# Patient Record
Sex: FEMALE | NOT HISPANIC OR LATINO | Employment: OTHER | URBAN - METROPOLITAN AREA
[De-identification: names, ages, dates, MRNs, and addresses within clinical notes are randomized per-mention and may not be internally consistent; named-entity substitution may affect disease eponyms.]

---

## 2019-01-08 ENCOUNTER — HOSPITAL ENCOUNTER (EMERGENCY)
Facility: HOSPITAL | Age: 59
Discharge: HOME | End: 2019-01-09
Attending: EMERGENCY MEDICINE | Admitting: EMERGENCY MEDICINE

## 2019-01-08 ENCOUNTER — APPOINTMENT (OUTPATIENT)
Dept: CT IMAGING | Facility: HOSPITAL | Age: 59
End: 2019-01-08

## 2019-01-08 DIAGNOSIS — E87.6 HYPOKALEMIA: ICD-10-CM

## 2019-01-08 DIAGNOSIS — F41.9 ANXIETY: ICD-10-CM

## 2019-01-08 DIAGNOSIS — R53.1 GENERALIZED WEAKNESS: Primary | ICD-10-CM

## 2019-01-08 LAB
BASOPHILS # BLD AUTO: 0.02 10*3/MM3 (ref 0–0.2)
BASOPHILS NFR BLD AUTO: 0.1 % (ref 0–1.5)
D-LACTATE SERPL-SCNC: 2 MMOL/L (ref 0.5–2)
DEPRECATED RDW RBC AUTO: 51.2 FL (ref 37–54)
EOSINOPHIL # BLD AUTO: 0.1 10*3/MM3 (ref 0–0.7)
EOSINOPHIL NFR BLD AUTO: 0.7 % (ref 0.3–6.2)
ERYTHROCYTE [DISTWIDTH] IN BLOOD BY AUTOMATED COUNT: 16 % (ref 11.7–13)
HCT VFR BLD AUTO: 38.9 % (ref 35.6–45.5)
HGB BLD-MCNC: 13.2 G/DL (ref 11.9–15.5)
IMM GRANULOCYTES # BLD AUTO: 0.06 10*3/MM3 (ref 0–0.03)
IMM GRANULOCYTES NFR BLD AUTO: 0.4 % (ref 0–0.5)
LYMPHOCYTES # BLD AUTO: 3.11 10*3/MM3 (ref 0.9–4.8)
LYMPHOCYTES NFR BLD AUTO: 22.9 % (ref 19.6–45.3)
MCH RBC QN AUTO: 29.6 PG (ref 26.9–32)
MCHC RBC AUTO-ENTMCNC: 33.9 G/DL (ref 32.4–36.3)
MCV RBC AUTO: 87.2 FL (ref 80.5–98.2)
MONOCYTES # BLD AUTO: 0.83 10*3/MM3 (ref 0.2–1.2)
MONOCYTES NFR BLD AUTO: 6.1 % (ref 5–12)
NEUTROPHILS # BLD AUTO: 9.44 10*3/MM3 (ref 1.9–8.1)
NEUTROPHILS NFR BLD AUTO: 69.8 % (ref 42.7–76)
PLATELET # BLD AUTO: 295 10*3/MM3 (ref 140–500)
PMV BLD AUTO: 10.1 FL (ref 6–12)
RBC # BLD AUTO: 4.46 10*6/MM3 (ref 3.9–5.2)
WBC NRBC COR # BLD: 13.56 10*3/MM3 (ref 4.5–10.7)

## 2019-01-08 PROCEDURE — 80053 COMPREHEN METABOLIC PANEL: CPT | Performed by: EMERGENCY MEDICINE

## 2019-01-08 PROCEDURE — 85610 PROTHROMBIN TIME: CPT | Performed by: EMERGENCY MEDICINE

## 2019-01-08 PROCEDURE — 85730 THROMBOPLASTIN TIME PARTIAL: CPT | Performed by: EMERGENCY MEDICINE

## 2019-01-08 PROCEDURE — 99284 EMERGENCY DEPT VISIT MOD MDM: CPT

## 2019-01-08 PROCEDURE — 84484 ASSAY OF TROPONIN QUANT: CPT | Performed by: EMERGENCY MEDICINE

## 2019-01-08 PROCEDURE — 83605 ASSAY OF LACTIC ACID: CPT | Performed by: EMERGENCY MEDICINE

## 2019-01-08 PROCEDURE — 96361 HYDRATE IV INFUSION ADD-ON: CPT

## 2019-01-08 PROCEDURE — 25010000002 LORAZEPAM PER 2 MG: Performed by: EMERGENCY MEDICINE

## 2019-01-08 PROCEDURE — 96374 THER/PROPH/DIAG INJ IV PUSH: CPT

## 2019-01-08 PROCEDURE — 96375 TX/PRO/DX INJ NEW DRUG ADDON: CPT

## 2019-01-08 PROCEDURE — 25010000002 ONDANSETRON PER 1 MG: Performed by: EMERGENCY MEDICINE

## 2019-01-08 PROCEDURE — 85025 COMPLETE CBC W/AUTO DIFF WBC: CPT | Performed by: EMERGENCY MEDICINE

## 2019-01-08 RX ORDER — SODIUM CHLORIDE 0.9 % (FLUSH) 0.9 %
10 SYRINGE (ML) INJECTION AS NEEDED
Status: DISCONTINUED | OUTPATIENT
Start: 2019-01-08 | End: 2019-01-09 | Stop reason: HOSPADM

## 2019-01-08 RX ORDER — ONDANSETRON 2 MG/ML
4 INJECTION INTRAMUSCULAR; INTRAVENOUS ONCE
Status: COMPLETED | OUTPATIENT
Start: 2019-01-08 | End: 2019-01-08

## 2019-01-08 RX ORDER — LORAZEPAM 2 MG/ML
0.5 INJECTION INTRAMUSCULAR ONCE
Status: COMPLETED | OUTPATIENT
Start: 2019-01-08 | End: 2019-01-08

## 2019-01-08 RX ADMIN — LORAZEPAM 0.5 MG: 2 INJECTION, SOLUTION INTRAMUSCULAR; INTRAVENOUS at 23:35

## 2019-01-08 RX ADMIN — SODIUM CHLORIDE 1000 ML: 9 INJECTION, SOLUTION INTRAVENOUS at 23:34

## 2019-01-08 RX ADMIN — ONDANSETRON 4 MG: 2 INJECTION INTRAMUSCULAR; INTRAVENOUS at 23:35

## 2019-01-09 ENCOUNTER — APPOINTMENT (OUTPATIENT)
Dept: CT IMAGING | Facility: HOSPITAL | Age: 59
End: 2019-01-09

## 2019-01-09 VITALS
HEIGHT: 69 IN | SYSTOLIC BLOOD PRESSURE: 128 MMHG | WEIGHT: 148.7 LBS | RESPIRATION RATE: 16 BRPM | TEMPERATURE: 98.5 F | OXYGEN SATURATION: 97 % | BODY MASS INDEX: 22.02 KG/M2 | DIASTOLIC BLOOD PRESSURE: 88 MMHG | HEART RATE: 112 BPM

## 2019-01-09 LAB
ALBUMIN SERPL-MCNC: 4.1 G/DL (ref 3.5–5.2)
ALBUMIN/GLOB SERPL: 1.2 G/DL
ALP SERPL-CCNC: 82 U/L (ref 39–117)
ALT SERPL W P-5'-P-CCNC: 21 U/L (ref 1–33)
ANION GAP SERPL CALCULATED.3IONS-SCNC: 17.8 MMOL/L
APTT PPP: 26.9 SECONDS (ref 22.7–35.4)
AST SERPL-CCNC: 31 U/L (ref 1–32)
BACTERIA UR QL AUTO: ABNORMAL /HPF
BILIRUB SERPL-MCNC: 0.6 MG/DL (ref 0.1–1.2)
BILIRUB UR QL STRIP: NEGATIVE
BUN BLD-MCNC: 7 MG/DL (ref 8–23)
BUN/CREAT SERPL: 9.7 (ref 7–25)
CALCIUM SPEC-SCNC: 9.5 MG/DL (ref 8.6–10.5)
CHLORIDE SERPL-SCNC: 95 MMOL/L (ref 98–107)
CLARITY UR: CLEAR
CO2 SERPL-SCNC: 21.2 MMOL/L (ref 22–29)
COLOR UR: YELLOW
CREAT BLD-MCNC: 0.72 MG/DL (ref 0.57–1)
GFR SERPL CREATININE-BSD FRML MDRD: 82 ML/MIN/1.73
GLOBULIN UR ELPH-MCNC: 3.5 GM/DL
GLUCOSE BLD-MCNC: 87 MG/DL (ref 65–99)
GLUCOSE UR STRIP-MCNC: NEGATIVE MG/DL
HGB UR QL STRIP.AUTO: ABNORMAL
HYALINE CASTS UR QL AUTO: ABNORMAL /LPF
INR PPP: 1.07 (ref 0.9–1.1)
KETONES UR QL STRIP: NEGATIVE
LEUKOCYTE ESTERASE UR QL STRIP.AUTO: NEGATIVE
NITRITE UR QL STRIP: NEGATIVE
PH UR STRIP.AUTO: 6 [PH] (ref 5–8)
POTASSIUM BLD-SCNC: 3.1 MMOL/L (ref 3.5–5.2)
PROT SERPL-MCNC: 7.6 G/DL (ref 6–8.5)
PROT UR QL STRIP: NEGATIVE
PROTHROMBIN TIME: 13.7 SECONDS (ref 11.7–14.2)
RBC # UR: ABNORMAL /HPF
REF LAB TEST METHOD: ABNORMAL
SODIUM BLD-SCNC: 134 MMOL/L (ref 136–145)
SP GR UR STRIP: 1.01 (ref 1–1.03)
SQUAMOUS #/AREA URNS HPF: ABNORMAL /HPF
TROPONIN T SERPL-MCNC: <0.01 NG/ML (ref 0–0.03)
UROBILINOGEN UR QL STRIP: ABNORMAL
WBC UR QL AUTO: ABNORMAL /HPF

## 2019-01-09 PROCEDURE — 81001 URINALYSIS AUTO W/SCOPE: CPT | Performed by: EMERGENCY MEDICINE

## 2019-01-09 PROCEDURE — 70450 CT HEAD/BRAIN W/O DYE: CPT

## 2019-01-09 RX ORDER — LORAZEPAM 1 MG/1
1 TABLET ORAL 2 TIMES DAILY PRN
Qty: 8 TABLET | Refills: 0 | Status: SHIPPED | OUTPATIENT
Start: 2019-01-09 | End: 2019-08-05

## 2019-01-09 RX ORDER — POTASSIUM CHLORIDE 750 MG/1
40 CAPSULE, EXTENDED RELEASE ORAL ONCE
Status: COMPLETED | OUTPATIENT
Start: 2019-01-09 | End: 2019-01-09

## 2019-01-09 RX ORDER — LORAZEPAM 1 MG/1
1 TABLET ORAL ONCE
Status: COMPLETED | OUTPATIENT
Start: 2019-01-09 | End: 2019-01-09

## 2019-01-09 RX ADMIN — POTASSIUM CHLORIDE 40 MEQ: 750 CAPSULE, EXTENDED RELEASE ORAL at 02:06

## 2019-01-09 RX ADMIN — LORAZEPAM 1 MG: 1 TABLET ORAL at 01:52

## 2019-01-09 NOTE — ED NOTES
"Pt states \"my  said last week I had an episode where I was totallly out of it.\" pt also reports stopped taking all medications x  2 months ago. +fatigue, +no appetite x 5 days, + 2 seizures today, +nausea no vomiting     Angi Celaya, RN  01/08/19 7762    Pt also c/o headache and generalized back pain.     Angi Celaya RN  01/08/19 6625    "

## 2019-01-09 NOTE — ED NOTES
unable to call cab, this RN called cab for pt. Yellow cab should be here within 15 mins.      Angi Celaya, CRUZ  01/09/19 0212

## 2019-01-09 NOTE — ED NOTES
Pt reports body pain and weakness that started x 1 week with worsening symptoms. Pt also reports SZ x 2 today. Pt alert and oriented.     Pt hx sarcoidsis, SZ.     Destiny Rojas, RN  01/08/19 2859

## 2019-01-09 NOTE — ED PROVIDER NOTES
EMERGENCY DEPARTMENT ENCOUNTER    CHIEF COMPLAINT  Chief Complaint: Generalized weakness  History given by: patient   History limited by: n/a  Room Number: EDWR/WR  PMD: Provider, No Known      HPI:  Pt is a 59 y.o. female who presents complaining of generalized weakness that has been ongoing for the past week. Pt states that she has been unable to ambulate for the past week due to the weakness. She also complains of nausea and vomiting, but denies diarrhea, abd pain, or any other sx. Pt states that all of her medications were discontinued 2 months ago.     Duration:  1 week   Onset: gradual  Timing: constant   Location: generalized   Radiation: none  Quality: weakness   Intensity/Severity: moderate  Progression: unchanged   Associated Symptoms: nausea, vomiting   Aggravating Factors: none  Alleviating Factors: none    PAST MEDICAL HISTORY  Active Ambulatory Problems     Diagnosis Date Noted   • No Active Ambulatory Problems     Resolved Ambulatory Problems     Diagnosis Date Noted   • No Resolved Ambulatory Problems     No Additional Past Medical History       PAST SURGICAL HISTORY  No past surgical history on file.    FAMILY HISTORY  No family history on file.    SOCIAL HISTORY  Social History     Socioeconomic History   • Marital status:      Spouse name: Not on file   • Number of children: Not on file   • Years of education: Not on file   • Highest education level: Not on file   Social Needs   • Financial resource strain: Not on file   • Food insecurity - worry: Not on file   • Food insecurity - inability: Not on file   • Transportation needs - medical: Not on file   • Transportation needs - non-medical: Not on file   Occupational History   • Not on file   Tobacco Use   • Smoking status: Not on file   Substance and Sexual Activity   • Alcohol use: Not on file   • Drug use: Not on file   • Sexual activity: Defer   Other Topics Concern   • Not on file   Social History Narrative   • Not on file        ALLERGIES  Augmentin [amoxicillin-pot clavulanate]; Cephalosporins; and Zyprexa [olanzapine]    REVIEW OF SYSTEMS  Review of Systems   Constitutional: Negative for fever.   HENT: Negative for sore throat.    Eyes: Negative.    Respiratory: Negative for cough and shortness of breath.    Cardiovascular: Negative for chest pain.   Gastrointestinal: Positive for nausea and vomiting. Negative for abdominal pain and diarrhea.   Genitourinary: Negative for dysuria.   Musculoskeletal: Negative for neck pain.   Skin: Negative for rash.   Allergic/Immunologic: Negative.    Neurological: Positive for weakness (generalized). Negative for numbness and headaches.   Hematological: Negative.    Psychiatric/Behavioral: Negative.    All other systems reviewed and are negative.      PHYSICAL EXAM  ED Triage Vitals [01/08/19 2200]   Temp Heart Rate Resp BP SpO2   98.5 °F (36.9 °C) 112 18 117/79 98 %      Temp src Heart Rate Source Patient Position BP Location FiO2 (%)   Tympanic -- -- -- --       Physical Exam   Constitutional: She is oriented to person, place, and time. No distress.   HENT:   Head: Normocephalic and atraumatic.   Eyes: EOM are normal. Pupils are equal, round, and reactive to light.   Neck: Normal range of motion. Neck supple.   Cardiovascular: Regular rhythm and normal heart sounds. Tachycardia present.   Pulmonary/Chest: Effort normal and breath sounds normal. No respiratory distress.   Abdominal: Soft. There is no tenderness. There is no rebound and no guarding.   Musculoskeletal: Normal range of motion. She exhibits no edema.   Neurological: She is alert and oriented to person, place, and time. She has normal sensation and normal strength.   Skin: Skin is warm and dry. No rash noted.   Psychiatric: Mood and affect normal.   Nursing note and vitals reviewed.      LAB RESULTS  Lab Results (last 24 hours)     Procedure Component Value Units Date/Time    CBC & Differential [[id number]] Collected:  01/08/19 9342     Specimen:  Blood Updated:  01/08/19 2341    Narrative:       The following orders were created for panel order CBC & Differential.  Procedure                               Abnormality         Status                     ---------                               -----------         ------                     CBC Auto Differential[[id number]]        Abnormal            Final result                 Please view results for these tests on the individual orders.    Comprehensive Metabolic Panel [[id number]]  (Abnormal) Collected:  01/08/19 2327    Specimen:  Blood Updated:  01/09/19 0002     Glucose 87 mg/dL      BUN 7 mg/dL      Creatinine 0.72 mg/dL      Sodium 134 mmol/L      Potassium 3.1 mmol/L      Chloride 95 mmol/L      CO2 21.2 mmol/L      Calcium 9.5 mg/dL      Total Protein 7.6 g/dL      Albumin 4.10 g/dL      ALT (SGPT) 21 U/L      AST (SGOT) 31 U/L      Alkaline Phosphatase 82 U/L      Total Bilirubin 0.6 mg/dL      eGFR Non African Amer 82 mL/min/1.73      Globulin 3.5 gm/dL      A/G Ratio 1.2 g/dL      BUN/Creatinine Ratio 9.7     Anion Gap 17.8 mmol/L     Troponin [[id number]]  (Normal) Collected:  01/08/19 2327    Specimen:  Blood Updated:  01/09/19 0002     Troponin T <0.010 ng/mL     Narrative:       Troponin T Reference Ranges:  Less than 0.03 ng/mL:    Negative for AMI  0.03 to 0.09 ng/mL:      Indeterminant for AMI  Greater than 0.09 ng/mL: Positive for AMI    Lactic Acid, Plasma [[id number]]  (Normal) Collected:  01/08/19 2327    Specimen:  Blood Updated:  01/08/19 2359     Lactate 2.0 mmol/L     CBC Auto Differential [[id number]]  (Abnormal) Collected:  01/08/19 2327    Specimen:  Blood Updated:  01/08/19 2341     WBC 13.56 10*3/mm3      RBC 4.46 10*6/mm3      Hemoglobin 13.2 g/dL      Hematocrit 38.9 %      MCV 87.2 fL      MCH 29.6 pg      MCHC 33.9 g/dL      RDW 16.0 %      RDW-SD 51.2 fl      MPV 10.1 fL      Platelets 295 10*3/mm3      Neutrophil % 69.8 %      Lymphocyte % 22.9 %      Monocyte % 6.1 %       Eosinophil % 0.7 %      Basophil % 0.1 %      Immature Grans % 0.4 %      Neutrophils, Absolute 9.44 10*3/mm3      Lymphocytes, Absolute 3.11 10*3/mm3      Monocytes, Absolute 0.83 10*3/mm3      Eosinophils, Absolute 0.10 10*3/mm3      Basophils, Absolute 0.02 10*3/mm3      Immature Grans, Absolute 0.06 10*3/mm3     Protime-INR [[id number]]  (Normal) Collected:  01/08/19 2344    Specimen:  Blood Updated:  01/09/19 0008     Protime 13.7 Seconds      INR 1.07    aPTT [[id number]]  (Normal) Collected:  01/08/19 2344    Specimen:  Blood Updated:  01/09/19 0008     PTT 26.9 seconds     Urinalysis With Microscopic If Indicated (No Culture) - Urine, Catheter [[id number]]  (Abnormal) Collected:  01/09/19 0008    Specimen:  Urine, Catheter Updated:  01/09/19 0035     Color, UA Yellow     Appearance, UA Clear     pH, UA 6.0     Specific Gravity, UA 1.011     Glucose, UA Negative     Ketones, UA Negative     Bilirubin, UA Negative     Blood, UA Trace     Protein, UA Negative     Leuk Esterase, UA Negative     Nitrite, UA Negative     Urobilinogen, UA 0.2 E.U./dL    Urinalysis, Microscopic Only - Urine, Catheter [[id number]]  (Abnormal) Collected:  01/09/19 0008    Specimen:  Urine, Catheter Updated:  01/09/19 0035     RBC, UA 6-12 /HPF      WBC, UA 0-2 /HPF      Bacteria, UA None Seen /HPF      Squamous Epithelial Cells, UA 0-2 /HPF      Hyaline Casts, UA None Seen /LPF      Methodology Automated Microscopy          I ordered the above labs and reviewed the results    RADIOLOGY  CT Head Without Contrast   Final Result   No acute intracranial process identified.       Radiation dose reduction techniques were utilized, including automated   exposure control and exposure modulation based on body size.       This report was finalized on 1/9/2019 12:59 AM by Dr. Alyce Rodriguez M.D.               I ordered the above noted radiological studies. Interpreted by radiologist.  Reviewed by me in PACS.        PROCEDURES  Procedures      PROGRESS AND CONSULTS       23:17  BP- 117/79 HR- 112 Temp- 98.5 °F (36.9 °C) (Tympanic) O2 sat- 98%  Informed pt of the plan for labs and CT head. Pt requesting medication to treat agitation. Pt understands and agrees with the plan, all questions answered.    23:20  IVF ordered for hydration. CT head, CMP, CBC, PT/INR, APTT, UA, troponin, and lactic acid ordered.     23:22  Ativan ordered to treat agitation, Zofran ordered to treat nausea.     00:30  RN states that the pt was able to ambulate to the bathroom.     01:28  BP- 121/74 HR- 112 Temp- 98.5 °F (36.9 °C) (Tympanic) O2 sat- 96%  Rechecked the patient who is in NAD and is resting comfortably. Informed pt that the workup in the ED shows mild hypokalemia, but CT head and remainder of the labs are unremarkable. Informed pt of the plan for d/c, pt instructed that she will need to contact a PMD for f/u. Will provide referral. Pt understands and agrees with the plan, all questions answered.    01:58  PO potassium ordered to treat hyperkalemia    MEDICAL DECISION MAKING  Results were reviewed/discussed with the patient and they were also made aware of online access. Pt also made aware that some labs, such as cultures, will not be resulted during ER visit and follow up with PMD is necessary.     MDM  Number of Diagnoses or Management Options     Amount and/or Complexity of Data Reviewed  Clinical lab tests: ordered and reviewed (K=3.1)  Tests in the radiology section of CPT®: ordered and reviewed (CT head shows NAD)  Decide to obtain previous medical records or to obtain history from someone other than the patient: yes  Review and summarize past medical records: yes (No prior ED visits. )    Patient Progress  Patient progress: stable         DIAGNOSIS  Final diagnoses:   Generalized weakness   Anxiety   Hypokalemia       DISPOSITION  DISCHARGE    Patient discharged in stable condition.    Reviewed implications of results, diagnosis,  meds, responsibility to follow up, warning signs and symptoms of possible worsening, potential complications and reasons to return to ER.    Patient/Family voiced understanding of above instructions.    Discussed plan for discharge, as there is no emergent indication for admission. Patient referred to primary care provider for BP management due to today's BP. Pt/family is agreeable and understands need for follow up and repeat testing.  Pt is aware that discharge does not mean that nothing is wrong but it indicates no emergency is present that requires admission and they must continue care with follow-up as given below or physician of their choice.     FOLLOW-UP  [facility] PHYSICAN REFERRAL SERVICE  [facility] [address]  [phone]  Schedule an appointment as soon as possible for a visit            Medication List      New Prescriptions    LORazepam 1 MG tablet  Commonly known as:  ATIVAN  Take 1 tablet by mouth 2 (Two) Times a Day As Needed for Anxiety.          Latest Documented Vital Signs:  As of 2:28 AM  BP- 128/88 HR- 112 Temp- 98.5 °F (36.9 °C) (Tympanic) O2 sat- 97%    --  Documentation assistance provided by carl Caraballo for Dr Dan.  Information recorded by the carl was done at my direction and has been verified and validated by me.     Ema Caraballo  01/09/19 0158       Trevor Dan MD  01/09/19 0228

## 2019-08-02 PROBLEM — F29 PSYCHOSIS (HCC): Status: ACTIVE | Noted: 2018-10-06

## 2019-08-02 RX ORDER — RISPERIDONE 3 MG/1
3 TABLET ORAL DAILY
COMMUNITY
Start: 2018-10-18 | End: 2019-08-05

## 2019-08-02 RX ORDER — FENTANYL 25 UG/H
1 PATCH TRANSDERMAL
COMMUNITY
Start: 2018-10-18 | End: 2019-08-05

## 2019-08-05 ENCOUNTER — OFFICE VISIT (OUTPATIENT)
Dept: FAMILY MEDICINE CLINIC | Facility: CLINIC | Age: 59
End: 2019-08-05

## 2019-08-05 VITALS
SYSTOLIC BLOOD PRESSURE: 148 MMHG | TEMPERATURE: 97.6 F | HEIGHT: 69 IN | RESPIRATION RATE: 12 BRPM | WEIGHT: 153 LBS | OXYGEN SATURATION: 97 % | DIASTOLIC BLOOD PRESSURE: 88 MMHG | BODY MASS INDEX: 22.66 KG/M2 | HEART RATE: 72 BPM

## 2019-08-05 DIAGNOSIS — F13.20 BENZODIAZEPINE DEPENDENCE (HCC): Primary | ICD-10-CM

## 2019-08-05 DIAGNOSIS — F23 BRIEF PSYCHOTIC DISORDER (HCC): ICD-10-CM

## 2019-08-05 DIAGNOSIS — Z76.5 DRUG-SEEKING BEHAVIOR: ICD-10-CM

## 2019-08-05 DIAGNOSIS — G89.29 CHRONIC MIDLINE LOW BACK PAIN WITHOUT SCIATICA: ICD-10-CM

## 2019-08-05 DIAGNOSIS — M54.50 CHRONIC MIDLINE LOW BACK PAIN WITHOUT SCIATICA: ICD-10-CM

## 2019-08-05 DIAGNOSIS — I10 ESSENTIAL HYPERTENSION: ICD-10-CM

## 2019-08-05 DIAGNOSIS — R21 RASH: ICD-10-CM

## 2019-08-05 DIAGNOSIS — D86.9 SARCOIDOSIS: ICD-10-CM

## 2019-08-05 DIAGNOSIS — G47.09 OTHER INSOMNIA: ICD-10-CM

## 2019-08-05 PROCEDURE — 99204 OFFICE O/P NEW MOD 45 MIN: CPT | Performed by: FAMILY MEDICINE

## 2019-08-05 RX ORDER — ONDANSETRON 4 MG/1
4 TABLET, FILM COATED ORAL
COMMUNITY
End: 2019-08-05

## 2019-08-05 RX ORDER — DIAZEPAM 10 MG/1
10 TABLET ORAL
COMMUNITY
End: 2019-08-05

## 2019-08-05 RX ORDER — HYDROXYZINE HYDROCHLORIDE 25 MG/1
25 TABLET, FILM COATED ORAL
COMMUNITY
Start: 2019-04-29 | End: 2019-08-05

## 2019-08-05 RX ORDER — HYDROCODONE BITARTRATE AND ACETAMINOPHEN 5; 325 MG/1; MG/1
TABLET ORAL SEE ADMIN INSTRUCTIONS
Refills: 0 | COMMUNITY
Start: 2019-07-18 | End: 2019-08-05

## 2019-08-05 RX ORDER — TEMAZEPAM 30 MG/1
30 CAPSULE ORAL
Refills: 0 | COMMUNITY
Start: 2019-05-20 | End: 2019-08-05

## 2019-08-05 RX ORDER — HYDROCODONE BITARTRATE AND ACETAMINOPHEN 10; 325 MG/1; MG/1
1 TABLET ORAL
COMMUNITY
End: 2019-08-05

## 2019-08-05 NOTE — PROGRESS NOTES
"Subjective   Ada Ngo is a 59 y.o. female is here for   Chief Complaint   Patient presents with   • Establish Care   • Insomnia   • Seizures   • Rash       History of Present Illness     Patient states that she has trouble sleeping, insomnia.  She has not seen a sleep specialist. She states she wants a doctor to prescribe her the medicines she wants and does not want to try any other medicines except the ones she wants. She states the laws in the country and state that make benzodiazepines controlled substances are bad laws and should be changed.    She states that if she is sleep deprived it affects her cognitive ability and that she also has seizures.  She has not seen a neurologist for a while.  She states that her psychiatrist, Dr. Francis Orourke, is no longer prescribing her medicine.    She states Dr. Verma is a neurologist who is a friend of hers from medical school that she is going to call to see if he will prescribe her the medicines she is looking for.    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  She has not had a PCP.   She states she is considering assisted suicide through an \"English Hel[employer].\"  She states she is a  and may synthesize her own medicine. She states she is not seriously considering hurting herself now and that she has contracted with others not to do any self harm, including her .  She agrees not to harm herself.    She states she has been diagnosed with major depression and generalized anxiety disorder.    She states during her residency she was preliminarily diagnosed with lymphoma which turned out to be sarcoidosis.  She states she saw an oncologist, George Mosher, who she states continued to see her for a long time although she did not have cancer.    She states she has had 2 MI's.  She states she does not know when the first " "MI occurred. She states she was used to having chest pain when she was diagnosed with a second MI at [facility] but cant remember much about it because she was \"in the ICU.\"  She states she did not have any intervention, no catheterization, no stents, no angiooasty, no CABG.  She refuses referral to cardiology.    She states if she goes without sleep too long she sees things that aren't really there.    She states she has chronic LBP and DDD. She would like a referral to pain management.    She sates she has had a skin rash for 6 months and would like to see a dermatologist. She states she has a friend who is a dermatologist, Dr. Elissa Gerardo, and she states she is going to call Dr. Gerardo herself.    She states she was on Seroquel for depression which caused her to have HTN. She states she takes Metoprolol for HTN.    The following portions of the patient's history were reviewed and updated as appropriate: allergies, current medications, past family history, past medical history, past social history, past surgical history and problem list.     reports that she has been smoking cigarettes.  She has been smoking about 2.00 packs per day. She has never used smokeless tobacco. Drug use questions deferred to the physician. She reports that she does not drink alcohol.    Review of Systems   Constitutional: Negative for activity change and unexpected weight change.   Respiratory: Negative for shortness of breath and wheezing.    Cardiovascular: Negative for chest pain and palpitations.   Gastrointestinal: Negative for abdominal pain, blood in stool and constipation.   Genitourinary: Negative for difficulty urinating and hematuria.   Musculoskeletal: Negative for gait problem.   Skin: Positive for rash. Negative for color change.        PHQ-9 Depression Screening  Little interest or pleasure in doing things? 0   Feeling down, depressed, or hopeless? 0   Trouble falling or staying asleep, or sleeping too much?   " "  Feeling tired or having little energy?     Poor appetite or overeating?     Feeling bad about yourself - or that you are a failure or have let yourself or your family down?     Trouble concentrating on things, such as reading the newspaper or watching television?     Moving or speaking so slowly that other people could have noticed? Or the opposite - being so fidgety or restless that you have been moving around a lot more than usual?     Thoughts that you would be better off dead, or of hurting yourself in some way?     PHQ-9 Total Score 0   If you checked off any problems, how difficult have these problems made it for you to do your work, take care of things at home, or get along with other people?           Objective   /88 (BP Location: Right arm, Patient Position: Sitting, Cuff Size: Adult)   Pulse 72   Temp 97.6 °F (36.4 °C) (Oral)   Resp 12   Ht 175.3 cm (69\")   Wt 69.4 kg (153 lb)   SpO2 97%   BMI 22.59 kg/m²   Physical Exam   Constitutional: She is oriented to person, place, and time. She appears well-developed and well-nourished. No distress.   HENT:   Head: Normocephalic and atraumatic.   Right Ear: External ear normal.   Left Ear: External ear normal.   Nose: Nose normal.   Mouth/Throat: Oropharynx is clear and moist. No oropharyngeal exudate.   Eyes: Lids are normal. Right eye exhibits no discharge. Left eye exhibits no discharge. No scleral icterus.   Neck: Trachea normal, normal range of motion and full passive range of motion without pain. Neck supple. No tracheal deviation and no edema present. No thyromegaly present.   Cardiovascular: Normal rate, regular rhythm, normal heart sounds and intact distal pulses. Exam reveals no gallop and no friction rub.   No murmur heard.  Pulmonary/Chest: Effort normal and breath sounds normal. No stridor. No tachypnea and no bradypnea. No respiratory distress. She has no decreased breath sounds. She has no wheezes. She has no rales. She exhibits no " tenderness.   Abdominal: Normal appearance. There is no hepatosplenomegaly.   Musculoskeletal: She exhibits no edema.   Lymphadenopathy:        Head (right side): No submental, no submandibular, no tonsillar, no preauricular, no posterior auricular and no occipital adenopathy present.        Head (left side): No submental, no submandibular, no tonsillar, no preauricular, no posterior auricular and no occipital adenopathy present.     She has no cervical adenopathy.        Right cervical: No superficial cervical, no deep cervical and no posterior cervical adenopathy present.       Left cervical: No superficial cervical, no deep cervical and no posterior cervical adenopathy present.   Neurological: She is alert and oriented to person, place, and time. She has normal strength and normal reflexes. She is not disoriented.   Skin: Skin is warm, dry and intact. Capillary refill takes less than 2 seconds. No rash noted. She is not diaphoretic. No cyanosis or erythema. No pallor. Nails show no clubbing.   Psychiatric: She has a normal mood and affect. Her behavior is normal. Cognition and memory are normal.   Nursing note and vitals reviewed.      Procedures    Assessment/Plan   Diagnoses and all orders for this visit:    1. Benzodiazepine dependence (CMS/HCC) (Primary)  -     Ambulatory Referral to Psychiatry  -     Ambulatory Referral to Psychology  -     Ambulatory Referral to Sleep Medicine  -     Ambulatory Referral to Pain Management    2. Brief psychotic disorder (CMS/HCC)  -     Ambulatory Referral to Psychiatry  -     Ambulatory Referral to Psychology    3. Sarcoidosis  -     Ambulatory Referral to Pulmonology    4. Drug-seeking behavior  -     Ambulatory Referral to Psychiatry  -     Ambulatory Referral to Psychology  -     Ambulatory Referral to Sleep Medicine  -     Ambulatory Referral to Pain Management    5. Chronic midline low back pain without sciatica  -     Ambulatory Referral to Pain Management    6. Other  insomnia  -     Ambulatory Referral to Sleep Medicine    7. Rash  -     Ambulatory Referral to Dermatology    8. Essential hypertension  Assessment & Plan:  Continue Metoprolol. No side effects.     Pt agrees to find another PCP and states she will call to find one today.

## 2019-08-12 ENCOUNTER — TELEPHONE (OUTPATIENT)
Dept: FAMILY MEDICINE CLINIC | Facility: CLINIC | Age: 59
End: 2019-08-12

## 2019-08-19 ENCOUNTER — APPOINTMENT (OUTPATIENT)
Dept: SLEEP MEDICINE | Facility: HOSPITAL | Age: 59
End: 2019-08-19

## 2019-12-16 ENCOUNTER — HOSPITAL ENCOUNTER (INPATIENT)
Facility: HOSPITAL | Age: 59
LOS: 2 days | Discharge: HOME | End: 2019-12-18
Attending: EMERGENCY MEDICINE | Admitting: HOSPITALIST

## 2019-12-16 ENCOUNTER — APPOINTMENT (OUTPATIENT)
Dept: GENERAL RADIOLOGY | Facility: HOSPITAL | Age: 59
End: 2019-12-16

## 2019-12-16 ENCOUNTER — APPOINTMENT (OUTPATIENT)
Dept: CT IMAGING | Facility: HOSPITAL | Age: 59
End: 2019-12-16

## 2019-12-16 DIAGNOSIS — J18.9 PNEUMONIA OF LEFT LOWER LOBE DUE TO INFECTIOUS ORGANISM: Primary | ICD-10-CM

## 2019-12-16 DIAGNOSIS — F13.20 BENZODIAZEPINE DEPENDENCE (HCC): ICD-10-CM

## 2019-12-16 PROBLEM — F17.200 TOBACCO DEPENDENCE: Status: ACTIVE | Noted: 2019-12-16

## 2019-12-16 PROBLEM — IMO0001 ALCOHOLISM /ALCOHOL ABUSE: Status: ACTIVE | Noted: 2019-12-16

## 2019-12-16 LAB
ALBUMIN SERPL-MCNC: 4.6 G/DL (ref 3.5–5.2)
ALBUMIN/GLOB SERPL: 1.2 G/DL
ALP SERPL-CCNC: 107 U/L (ref 39–117)
ALT SERPL W P-5'-P-CCNC: 9 U/L (ref 1–33)
ANION GAP SERPL CALCULATED.3IONS-SCNC: 16.6 MMOL/L (ref 5–15)
ANION GAP SERPL CALCULATED.3IONS-SCNC: 17.5 MMOL/L (ref 5–15)
AST SERPL-CCNC: 15 U/L (ref 1–32)
B PARAPERT DNA SPEC QL NAA+PROBE: NOT DETECTED
B PERT DNA SPEC QL NAA+PROBE: NOT DETECTED
BASOPHILS # BLD AUTO: 0.09 10*3/MM3 (ref 0–0.2)
BASOPHILS NFR BLD AUTO: 0.7 % (ref 0–1.5)
BILIRUB SERPL-MCNC: 0.6 MG/DL (ref 0.2–1.2)
BUN BLD-MCNC: 11 MG/DL (ref 8–23)
BUN BLD-MCNC: 9 MG/DL (ref 8–23)
BUN/CREAT SERPL: 17.7 (ref 7–25)
BUN/CREAT SERPL: 18 (ref 7–25)
C PNEUM DNA NPH QL NAA+NON-PROBE: NOT DETECTED
CALCIUM SPEC-SCNC: 8.9 MG/DL (ref 8.6–10.5)
CALCIUM SPEC-SCNC: 9.7 MG/DL (ref 8.6–10.5)
CHLORIDE SERPL-SCNC: 98 MMOL/L (ref 98–107)
CHLORIDE SERPL-SCNC: 98 MMOL/L (ref 98–107)
CO2 SERPL-SCNC: 22.4 MMOL/L (ref 22–29)
CO2 SERPL-SCNC: 23.5 MMOL/L (ref 22–29)
CREAT BLD-MCNC: 0.5 MG/DL (ref 0.57–1)
CREAT BLD-MCNC: 0.62 MG/DL (ref 0.57–1)
D DIMER PPP FEU-MCNC: 1.68 MCGFEU/ML (ref 0–0.49)
D-LACTATE SERPL-SCNC: 2 MMOL/L (ref 0.5–2)
D-LACTATE SERPL-SCNC: 2.4 MMOL/L (ref 0.5–2)
DEPRECATED RDW RBC AUTO: 50.6 FL (ref 37–54)
DEPRECATED RDW RBC AUTO: 51.2 FL (ref 37–54)
EOSINOPHIL # BLD AUTO: 0.2 10*3/MM3 (ref 0–0.4)
EOSINOPHIL NFR BLD AUTO: 1.5 % (ref 0.3–6.2)
ERYTHROCYTE [DISTWIDTH] IN BLOOD BY AUTOMATED COUNT: 16.5 % (ref 12.3–15.4)
ERYTHROCYTE [DISTWIDTH] IN BLOOD BY AUTOMATED COUNT: 16.6 % (ref 12.3–15.4)
FLUAV H1 2009 PAND RNA NPH QL NAA+PROBE: NOT DETECTED
FLUAV H1 HA GENE NPH QL NAA+PROBE: NOT DETECTED
FLUAV H3 RNA NPH QL NAA+PROBE: NOT DETECTED
FLUAV SUBTYP SPEC NAA+PROBE: NOT DETECTED
FLUBV RNA ISLT QL NAA+PROBE: NOT DETECTED
GFR SERPL CREATININE-BSD FRML MDRD: 125 ML/MIN/1.73
GFR SERPL CREATININE-BSD FRML MDRD: 98 ML/MIN/1.73
GLOBULIN UR ELPH-MCNC: 3.7 GM/DL
GLUCOSE BLD-MCNC: 130 MG/DL (ref 65–99)
GLUCOSE BLD-MCNC: 140 MG/DL (ref 65–99)
HADV DNA SPEC NAA+PROBE: NOT DETECTED
HCOV 229E RNA SPEC QL NAA+PROBE: NOT DETECTED
HCOV HKU1 RNA SPEC QL NAA+PROBE: NOT DETECTED
HCOV NL63 RNA SPEC QL NAA+PROBE: NOT DETECTED
HCOV OC43 RNA SPEC QL NAA+PROBE: NOT DETECTED
HCT VFR BLD AUTO: 38.2 % (ref 34–46.6)
HCT VFR BLD AUTO: 39.9 % (ref 34–46.6)
HGB BLD-MCNC: 12.8 G/DL (ref 12–15.9)
HGB BLD-MCNC: 13.3 G/DL (ref 12–15.9)
HMPV RNA NPH QL NAA+NON-PROBE: NOT DETECTED
HOLD SPECIMEN: NORMAL
HPIV1 RNA SPEC QL NAA+PROBE: NOT DETECTED
HPIV2 RNA SPEC QL NAA+PROBE: NOT DETECTED
HPIV3 RNA NPH QL NAA+PROBE: NOT DETECTED
HPIV4 P GENE NPH QL NAA+PROBE: NOT DETECTED
IMM GRANULOCYTES # BLD AUTO: 0.08 10*3/MM3 (ref 0–0.05)
IMM GRANULOCYTES NFR BLD AUTO: 0.6 % (ref 0–0.5)
INR PPP: 0.94 (ref 0.9–1.1)
L PNEUMO1 AG UR QL IA: NEGATIVE
LYMPHOCYTES # BLD AUTO: 1.78 10*3/MM3 (ref 0.7–3.1)
LYMPHOCYTES NFR BLD AUTO: 13.2 % (ref 19.6–45.3)
M PNEUMO IGG SER IA-ACNC: NOT DETECTED
MCH RBC QN AUTO: 28.1 PG (ref 26.6–33)
MCH RBC QN AUTO: 28.5 PG (ref 26.6–33)
MCHC RBC AUTO-ENTMCNC: 33.3 G/DL (ref 31.5–35.7)
MCHC RBC AUTO-ENTMCNC: 33.5 G/DL (ref 31.5–35.7)
MCV RBC AUTO: 84 FL (ref 79–97)
MCV RBC AUTO: 85.6 FL (ref 79–97)
MONOCYTES # BLD AUTO: 0.53 10*3/MM3 (ref 0.1–0.9)
MONOCYTES NFR BLD AUTO: 3.9 % (ref 5–12)
NEUTROPHILS # BLD AUTO: 10.79 10*3/MM3 (ref 1.7–7)
NEUTROPHILS NFR BLD AUTO: 80.1 % (ref 42.7–76)
NRBC BLD AUTO-RTO: 0 /100 WBC (ref 0–0.2)
NT-PROBNP SERPL-MCNC: 1734 PG/ML (ref 5–900)
PLATELET # BLD AUTO: 301 10*3/MM3 (ref 140–450)
PLATELET # BLD AUTO: 336 10*3/MM3 (ref 140–450)
PMV BLD AUTO: 9.5 FL (ref 6–12)
PMV BLD AUTO: 9.7 FL (ref 6–12)
POTASSIUM BLD-SCNC: 3.8 MMOL/L (ref 3.5–5.2)
POTASSIUM BLD-SCNC: 3.8 MMOL/L (ref 3.5–5.2)
PROCALCITONIN SERPL-MCNC: 0.06 NG/ML (ref 0.1–0.25)
PROT SERPL-MCNC: 8.3 G/DL (ref 6–8.5)
PROTHROMBIN TIME: 12.3 SECONDS (ref 11.7–14.2)
RBC # BLD AUTO: 4.55 10*6/MM3 (ref 3.77–5.28)
RBC # BLD AUTO: 4.66 10*6/MM3 (ref 3.77–5.28)
RHINOVIRUS RNA SPEC NAA+PROBE: NOT DETECTED
RSV RNA NPH QL NAA+NON-PROBE: NOT DETECTED
S PNEUM AG SPEC QL LA: NEGATIVE
SODIUM BLD-SCNC: 137 MMOL/L (ref 136–145)
SODIUM BLD-SCNC: 139 MMOL/L (ref 136–145)
TROPONIN T SERPL-MCNC: <0.01 NG/ML (ref 0–0.03)
WBC NRBC COR # BLD: 10.79 10*3/MM3 (ref 3.4–10.8)
WBC NRBC COR # BLD: 13.47 10*3/MM3 (ref 3.4–10.8)

## 2019-12-16 PROCEDURE — 0 IOPAMIDOL PER 1 ML: Performed by: EMERGENCY MEDICINE

## 2019-12-16 PROCEDURE — 87899 AGENT NOS ASSAY W/OPTIC: CPT | Performed by: NURSE PRACTITIONER

## 2019-12-16 PROCEDURE — 85610 PROTHROMBIN TIME: CPT | Performed by: EMERGENCY MEDICINE

## 2019-12-16 PROCEDURE — 92610 EVALUATE SWALLOWING FUNCTION: CPT

## 2019-12-16 PROCEDURE — 85025 COMPLETE CBC W/AUTO DIFF WBC: CPT | Performed by: EMERGENCY MEDICINE

## 2019-12-16 PROCEDURE — 85027 COMPLETE CBC AUTOMATED: CPT | Performed by: NURSE PRACTITIONER

## 2019-12-16 PROCEDURE — 80053 COMPREHEN METABOLIC PANEL: CPT | Performed by: EMERGENCY MEDICINE

## 2019-12-16 PROCEDURE — 99284 EMERGENCY DEPT VISIT MOD MDM: CPT

## 2019-12-16 PROCEDURE — 93005 ELECTROCARDIOGRAM TRACING: CPT | Performed by: EMERGENCY MEDICINE

## 2019-12-16 PROCEDURE — 25010000002 ONDANSETRON PER 1 MG: Performed by: NURSE PRACTITIONER

## 2019-12-16 PROCEDURE — 71275 CT ANGIOGRAPHY CHEST: CPT

## 2019-12-16 PROCEDURE — 97110 THERAPEUTIC EXERCISES: CPT

## 2019-12-16 PROCEDURE — 83880 ASSAY OF NATRIURETIC PEPTIDE: CPT | Performed by: EMERGENCY MEDICINE

## 2019-12-16 PROCEDURE — 84145 PROCALCITONIN (PCT): CPT | Performed by: EMERGENCY MEDICINE

## 2019-12-16 PROCEDURE — 94640 AIRWAY INHALATION TREATMENT: CPT

## 2019-12-16 PROCEDURE — 90791 PSYCH DIAGNOSTIC EVALUATION: CPT

## 2019-12-16 PROCEDURE — 85379 FIBRIN DEGRADATION QUANT: CPT | Performed by: EMERGENCY MEDICINE

## 2019-12-16 PROCEDURE — 83605 ASSAY OF LACTIC ACID: CPT | Performed by: EMERGENCY MEDICINE

## 2019-12-16 PROCEDURE — 36415 COLL VENOUS BLD VENIPUNCTURE: CPT | Performed by: NURSE PRACTITIONER

## 2019-12-16 PROCEDURE — 97161 PT EVAL LOW COMPLEX 20 MIN: CPT

## 2019-12-16 PROCEDURE — 84484 ASSAY OF TROPONIN QUANT: CPT | Performed by: EMERGENCY MEDICINE

## 2019-12-16 PROCEDURE — 93010 ELECTROCARDIOGRAM REPORT: CPT | Performed by: INTERNAL MEDICINE

## 2019-12-16 PROCEDURE — 71046 X-RAY EXAM CHEST 2 VIEWS: CPT

## 2019-12-16 PROCEDURE — 94799 UNLISTED PULMONARY SVC/PX: CPT

## 2019-12-16 PROCEDURE — 25010000002 LEVOFLOXACIN PER 250 MG: Performed by: EMERGENCY MEDICINE

## 2019-12-16 PROCEDURE — 87205 SMEAR GRAM STAIN: CPT | Performed by: NURSE PRACTITIONER

## 2019-12-16 PROCEDURE — 87040 BLOOD CULTURE FOR BACTERIA: CPT | Performed by: EMERGENCY MEDICINE

## 2019-12-16 PROCEDURE — 0100U HC BIOFIRE FILMARRAY RESP PANEL 2: CPT | Performed by: NURSE PRACTITIONER

## 2019-12-16 PROCEDURE — 87070 CULTURE OTHR SPECIMN AEROBIC: CPT | Performed by: NURSE PRACTITIONER

## 2019-12-16 RX ORDER — LORAZEPAM 2 MG/ML
1 INJECTION INTRAMUSCULAR
Status: DISCONTINUED | OUTPATIENT
Start: 2019-12-16 | End: 2019-12-18 | Stop reason: HOSPADM

## 2019-12-16 RX ORDER — LORAZEPAM 2 MG/ML
2 INJECTION INTRAMUSCULAR
Status: DISCONTINUED | OUTPATIENT
Start: 2019-12-16 | End: 2019-12-18 | Stop reason: HOSPADM

## 2019-12-16 RX ORDER — ONDANSETRON 2 MG/ML
4 INJECTION INTRAMUSCULAR; INTRAVENOUS EVERY 6 HOURS PRN
Status: DISCONTINUED | OUTPATIENT
Start: 2019-12-16 | End: 2019-12-18 | Stop reason: HOSPADM

## 2019-12-16 RX ORDER — ACETAMINOPHEN 650 MG/1
650 SUPPOSITORY RECTAL EVERY 4 HOURS PRN
Status: DISCONTINUED | OUTPATIENT
Start: 2019-12-16 | End: 2019-12-18 | Stop reason: HOSPADM

## 2019-12-16 RX ORDER — ASPIRIN 325 MG
325 TABLET ORAL DAILY
Status: DISCONTINUED | OUTPATIENT
Start: 2019-12-16 | End: 2019-12-18 | Stop reason: HOSPADM

## 2019-12-16 RX ORDER — CHOLECALCIFEROL (VITAMIN D3) 125 MCG
5 CAPSULE ORAL NIGHTLY PRN
Status: DISCONTINUED | OUTPATIENT
Start: 2019-12-16 | End: 2019-12-18 | Stop reason: HOSPADM

## 2019-12-16 RX ORDER — ACETAMINOPHEN 325 MG/1
650 TABLET ORAL EVERY 4 HOURS PRN
Status: DISCONTINUED | OUTPATIENT
Start: 2019-12-16 | End: 2019-12-18 | Stop reason: HOSPADM

## 2019-12-16 RX ORDER — SODIUM CHLORIDE 0.9 % (FLUSH) 0.9 %
10 SYRINGE (ML) INJECTION EVERY 12 HOURS SCHEDULED
Status: DISCONTINUED | OUTPATIENT
Start: 2019-12-16 | End: 2019-12-18 | Stop reason: HOSPADM

## 2019-12-16 RX ORDER — SODIUM CHLORIDE 9 MG/ML
125 INJECTION, SOLUTION INTRAVENOUS CONTINUOUS
Status: DISCONTINUED | OUTPATIENT
Start: 2019-12-16 | End: 2019-12-16

## 2019-12-16 RX ORDER — ALBUTEROL SULFATE 2.5 MG/3ML
2.5 SOLUTION RESPIRATORY (INHALATION) ONCE
Status: COMPLETED | OUTPATIENT
Start: 2019-12-16 | End: 2019-12-16

## 2019-12-16 RX ORDER — SODIUM CHLORIDE, SODIUM LACTATE, POTASSIUM CHLORIDE, CALCIUM CHLORIDE 600; 310; 30; 20 MG/100ML; MG/100ML; MG/100ML; MG/100ML
75 INJECTION, SOLUTION INTRAVENOUS CONTINUOUS
Status: DISCONTINUED | OUTPATIENT
Start: 2019-12-16 | End: 2019-12-17

## 2019-12-16 RX ORDER — LORAZEPAM 1 MG/1
1 TABLET ORAL
Status: DISCONTINUED | OUTPATIENT
Start: 2019-12-16 | End: 2019-12-18 | Stop reason: HOSPADM

## 2019-12-16 RX ORDER — SODIUM CHLORIDE 0.9 % (FLUSH) 0.9 %
10 SYRINGE (ML) INJECTION AS NEEDED
Status: DISCONTINUED | OUTPATIENT
Start: 2019-12-16 | End: 2019-12-18 | Stop reason: HOSPADM

## 2019-12-16 RX ORDER — ASPIRIN 325 MG
325 TABLET ORAL DAILY
COMMUNITY
End: 2020-05-01 | Stop reason: HOSPADM

## 2019-12-16 RX ORDER — IPRATROPIUM BROMIDE AND ALBUTEROL SULFATE 2.5; .5 MG/3ML; MG/3ML
3 SOLUTION RESPIRATORY (INHALATION)
Status: DISCONTINUED | OUTPATIENT
Start: 2019-12-16 | End: 2019-12-18 | Stop reason: HOSPADM

## 2019-12-16 RX ORDER — ACETAMINOPHEN 160 MG/5ML
650 SOLUTION ORAL EVERY 4 HOURS PRN
Status: DISCONTINUED | OUTPATIENT
Start: 2019-12-16 | End: 2019-12-18 | Stop reason: HOSPADM

## 2019-12-16 RX ORDER — LORAZEPAM 1 MG/1
2 TABLET ORAL
Status: DISCONTINUED | OUTPATIENT
Start: 2019-12-16 | End: 2019-12-18 | Stop reason: HOSPADM

## 2019-12-16 RX ORDER — FEXOFENADINE HYDROCHLORIDE 60 MG/1
60 TABLET, FILM COATED ORAL DAILY
COMMUNITY
End: 2020-05-29 | Stop reason: HOSPADM

## 2019-12-16 RX ORDER — LEVOFLOXACIN 5 MG/ML
750 INJECTION, SOLUTION INTRAVENOUS ONCE
Status: COMPLETED | OUTPATIENT
Start: 2019-12-16 | End: 2019-12-16

## 2019-12-16 RX ORDER — SODIUM CHLORIDE 9 MG/ML
100 INJECTION, SOLUTION INTRAVENOUS CONTINUOUS
Status: DISCONTINUED | OUTPATIENT
Start: 2019-12-16 | End: 2019-12-16

## 2019-12-16 RX ORDER — IPRATROPIUM BROMIDE AND ALBUTEROL SULFATE 2.5; .5 MG/3ML; MG/3ML
3 SOLUTION RESPIRATORY (INHALATION) EVERY 4 HOURS PRN
Status: DISCONTINUED | OUTPATIENT
Start: 2019-12-16 | End: 2019-12-18 | Stop reason: HOSPADM

## 2019-12-16 RX ADMIN — SODIUM CHLORIDE, POTASSIUM CHLORIDE, SODIUM LACTATE AND CALCIUM CHLORIDE 75 ML/HR: 600; 310; 30; 20 INJECTION, SOLUTION INTRAVENOUS at 16:41

## 2019-12-16 RX ADMIN — LORAZEPAM 1 MG: 1 TABLET ORAL at 09:34

## 2019-12-16 RX ADMIN — IPRATROPIUM BROMIDE AND ALBUTEROL SULFATE 3 ML: 2.5; .5 SOLUTION RESPIRATORY (INHALATION) at 19:10

## 2019-12-16 RX ADMIN — SODIUM CHLORIDE 125 ML/HR: 9 INJECTION, SOLUTION INTRAVENOUS at 12:49

## 2019-12-16 RX ADMIN — METOPROLOL TARTRATE 25 MG: 25 TABLET ORAL at 12:10

## 2019-12-16 RX ADMIN — IOPAMIDOL 95 ML: 755 INJECTION, SOLUTION INTRAVENOUS at 04:27

## 2019-12-16 RX ADMIN — LEVOFLOXACIN 750 MG: 5 INJECTION, SOLUTION INTRAVENOUS at 03:28

## 2019-12-16 RX ADMIN — ACETAMINOPHEN 650 MG: 325 TABLET, FILM COATED ORAL at 08:47

## 2019-12-16 RX ADMIN — METOPROLOL TARTRATE 25 MG: 25 TABLET ORAL at 20:23

## 2019-12-16 RX ADMIN — SODIUM CHLORIDE 125 ML/HR: 9 INJECTION, SOLUTION INTRAVENOUS at 03:28

## 2019-12-16 RX ADMIN — ONDANSETRON 4 MG: 2 INJECTION INTRAMUSCULAR; INTRAVENOUS at 05:25

## 2019-12-16 RX ADMIN — IPRATROPIUM BROMIDE AND ALBUTEROL SULFATE 3 ML: 2.5; .5 SOLUTION RESPIRATORY (INHALATION) at 23:32

## 2019-12-16 RX ADMIN — ALBUTEROL SULFATE 2.5 MG: 2.5 SOLUTION RESPIRATORY (INHALATION) at 02:58

## 2019-12-16 RX ADMIN — SODIUM CHLORIDE, PRESERVATIVE FREE 10 ML: 5 INJECTION INTRAVENOUS at 08:48

## 2019-12-16 RX ADMIN — LORAZEPAM 1 MG: 1 TABLET ORAL at 22:14

## 2019-12-16 RX ADMIN — ASPIRIN 325 MG: 325 TABLET ORAL at 12:10

## 2019-12-16 RX ADMIN — IPRATROPIUM BROMIDE AND ALBUTEROL SULFATE 3 ML: 2.5; .5 SOLUTION RESPIRATORY (INHALATION) at 16:15

## 2019-12-16 RX ADMIN — Medication 5 MG: at 22:14

## 2019-12-16 RX ADMIN — NICOTINE 1 PATCH: 7 PATCH, EXTENDED RELEASE TRANSDERMAL at 22:58

## 2019-12-16 RX ADMIN — LORAZEPAM 1 MG: 1 TABLET ORAL at 14:27

## 2019-12-16 RX ADMIN — SODIUM CHLORIDE, PRESERVATIVE FREE 10 ML: 5 INJECTION INTRAVENOUS at 20:00

## 2019-12-16 NOTE — ED PROVIDER NOTES
EMERGENCY DEPARTMENT ENCOUNTER    CHIEF COMPLAINT  Chief Complaint: Shortness of Air   History given by: Patient   History limited by: none   Room Number: 13/13  PMD: Trenton Cerrato Jr., DO      HPI:  Pt is a 59 y.o. female who presents complaining of shortness of air for the past several weeks. Pt confirms mild productive cough with yellow sputum, chest pain, sinus drainage, congestion, and fever. Pt denies abd pain, nausea, vomiting, diarrhea, urinary symptoms, or any other complaints. Per pt, she has hx of sarcoidosis and states she has intermittent fevers due to hx.     Duration:  Several weeks   Onset: gradual   Timing: constant   Location: chest   Radiation: none   Quality: SOA   Intensity/Severity: mild to moderate   Progression: unchanged   Associated Symptoms: cough, chest pain, sinus drainage, congestion, fever   Aggravating Factors: none   Alleviating Factors: none   Previous Episodes: none   Treatment before arrival: none     PAST MEDICAL HISTORY  Active Ambulatory Problems     Diagnosis Date Noted   • Benzodiazepine dependence (CMS/HCC) 12/27/2016   • Psychosis (CMS/HCC) 10/06/2018   • Sarcoidosis 04/29/2019   • Drug-seeking behavior 04/29/2019   • Chronic midline low back pain without sciatica 08/05/2019   • Other insomnia 08/05/2019   • Essential hypertension 08/05/2019     Resolved Ambulatory Problems     Diagnosis Date Noted   • No Resolved Ambulatory Problems     Past Medical History:   Diagnosis Date   • Coronary artery disease    • Hypertension    • Pulmonary emboli (CMS/HCC)    • Stroke (CMS/HCC)        PAST SURGICAL HISTORY  Past Surgical History:   Procedure Laterality Date   • OVARIAN CYST REMOVAL     • SINUS SURGERY         FAMILY HISTORY  Family History   Problem Relation Age of Onset   • Heart attack Mother    • Heart disease Mother    • Heart disease Father        SOCIAL HISTORY  Social History     Socioeconomic History   • Marital status:      Spouse name: Not on file   •  Number of children: Not on file   • Years of education: Not on file   • Highest education level: Not on file   Tobacco Use   • Smoking status: Current Every Day Smoker     Packs/day: 2.00     Types: Cigarettes   • Smokeless tobacco: Never Used   Substance and Sexual Activity   • Alcohol use: No     Frequency: Never   • Drug use: Defer   • Sexual activity: Defer       ALLERGIES  Augmentin [amoxicillin-pot clavulanate]; Cephalosporins; Zyprexa [olanzapine]; Amoxicillin; and Zolpidem    REVIEW OF SYSTEMS  Review of Systems   Constitutional: Positive for fever.   HENT: Positive for congestion and postnasal drip (sinus drainage). Negative for sore throat.    Eyes: Negative.    Respiratory: Positive for cough (productive with yellow sputum) and shortness of breath.    Cardiovascular: Positive for chest pain.   Gastrointestinal: Negative for abdominal pain, diarrhea and vomiting.   Genitourinary: Negative for dysuria.   Musculoskeletal: Negative for neck pain.   Skin: Negative for rash.   Allergic/Immunologic: Negative.    Neurological: Negative for weakness, numbness and headaches.   Hematological: Negative.    Psychiatric/Behavioral: Negative.    All other systems reviewed and are negative.      PHYSICAL EXAM  ED Triage Vitals   Temp Heart Rate Resp BP SpO2   12/16/19 0133 12/16/19 0133 12/16/19 0137 12/16/19 0133 12/16/19 0133   98.4 °F (36.9 °C) 98 18 177/99 100 %      Temp src Heart Rate Source Patient Position BP Location FiO2 (%)   12/16/19 0133 12/16/19 0133 -- -- --   Tympanic Monitor          Physical Exam   Constitutional: She is oriented to person, place, and time. No distress.   HENT:   Head: Normocephalic and atraumatic.   Eyes: Pupils are equal, round, and reactive to light. EOM are normal.   Neck: Normal range of motion. Neck supple.   Cardiovascular: Normal rate, regular rhythm and normal heart sounds.   Pulmonary/Chest: She is in respiratory distress (mild). She has wheezes (mild expiratory).   Abdominal:  Soft. There is no tenderness. There is no rebound and no guarding.   Musculoskeletal: Normal range of motion. She exhibits no edema.   Neurological: She is alert and oriented to person, place, and time. She has normal sensation and normal strength.   Skin: Skin is warm and dry. No rash noted.   Psychiatric: Mood and affect normal.   Nursing note and vitals reviewed.      LAB RESULTS  Lab Results (last 24 hours)     Procedure Component Value Units Date/Time    CBC & Differential [[id number]] Collected:  12/16/19 0216    Specimen:  Blood Updated:  12/16/19 0227    Narrative:       The following orders were created for panel order CBC & Differential.  Procedure                               Abnormality         Status                     ---------                               -----------         ------                     CBC Auto Differential[[id number]]        Abnormal            Final result                 Please view results for these tests on the individual orders.    Protime-INR [[id number]]  (Normal) Collected:  12/16/19 0216    Specimen:  Blood Updated:  12/16/19 0242     Protime 12.3 Seconds      INR 0.94    D-dimer, Quantitative [[id number]]  (Abnormal) Collected:  12/16/19 0216    Specimen:  Blood Updated:  12/16/19 0244     D-Dimer, Quantitative 1.68 MCGFEU/mL     Narrative:       The Stago D-Dimer test used in conjunction with a clinical pretest probability (PTP) assessment model, has been approved by the FDA to rule out the presence of venous thromboembolism (VTE) in outpatients suspected of deep venous thrombosis (DVT) or pulmonary embolism (PE). The cut-off for negative predictive value is <0.50 MCGFEU/mL.    BNP [[id number]]  (Abnormal) Collected:  12/16/19 0216    Specimen:  Blood Updated:  12/16/19 0249     proBNP 1,734.0 pg/mL     Narrative:       Among patients with dyspnea, NT-proBNP is highly sensitive for the detection of acute congestive heart failure. In addition NT-proBNP of <300 pg/ml  effectively rules out acute congestive heart failure with 99% negative predictive value.      Lactic Acid, Plasma [[id number]]  (Abnormal) Collected:  12/16/19 0216    Specimen:  Blood Updated:  12/16/19 0249     Lactate 2.4 mmol/L     CBC Auto Differential [[id number]]  (Abnormal) Collected:  12/16/19 0216    Specimen:  Blood Updated:  12/16/19 0227     WBC 13.47 10*3/mm3      RBC 4.66 10*6/mm3      Hemoglobin 13.3 g/dL      Hematocrit 39.9 %      MCV 85.6 fL      MCH 28.5 pg      MCHC 33.3 g/dL      RDW 16.6 %      RDW-SD 51.2 fl      MPV 9.7 fL      Platelets 336 10*3/mm3      Neutrophil % 80.1 %      Lymphocyte % 13.2 %      Monocyte % 3.9 %      Eosinophil % 1.5 %      Basophil % 0.7 %      Immature Grans % 0.6 %      Neutrophils, Absolute 10.79 10*3/mm3      Lymphocytes, Absolute 1.78 10*3/mm3      Monocytes, Absolute 0.53 10*3/mm3      Eosinophils, Absolute 0.20 10*3/mm3      Basophils, Absolute 0.09 10*3/mm3      Immature Grans, Absolute 0.08 10*3/mm3      nRBC 0.0 /100 WBC     Lactic Acid, Reflex Timer (This will reflex a repeat order 3-3:15 hours after ordered.) [[id number]] Collected:  12/16/19 0216    Specimen:  Blood Updated:  12/16/19 0249    Comprehensive Metabolic Panel [[id number]]  (Abnormal) Collected:  12/16/19 0320    Specimen:  Blood Updated:  12/16/19 0355     Glucose 140 mg/dL      BUN 11 mg/dL      Creatinine 0.62 mg/dL      Sodium 139 mmol/L      Potassium 3.8 mmol/L      Chloride 98 mmol/L      CO2 23.5 mmol/L      Calcium 9.7 mg/dL      Total Protein 8.3 g/dL      Albumin 4.60 g/dL      ALT (SGPT) 9 U/L      AST (SGOT) 15 U/L      Alkaline Phosphatase 107 U/L      Total Bilirubin 0.6 mg/dL      eGFR Non African Amer 98 mL/min/1.73      Globulin 3.7 gm/dL      A/G Ratio 1.2 g/dL      BUN/Creatinine Ratio 17.7     Anion Gap 17.5 mmol/L     Narrative:       GFR Normal >60  Chronic Kidney Disease <60  Kidney Failure <15      Troponin [[id number]]  (Normal) Collected:  12/16/19 5463    Specimen:   "Blood Updated:  12/16/19 0402     Troponin T <0.010 ng/mL     Narrative:       Troponin T Reference Range:  <= 0.03 ng/mL-   Negative for AMI  >0.03 ng/mL-     Abnormal for myocardial necrosis.  Clinicians would have to utilize clinical acumen, EKG, Troponin and serial changes to determine if it is an Acute Myocardial Infarction or myocardial injury due to an underlying chronic condition.     Blood Culture - Blood, Arm, Right [[id number]] Collected:  12/16/19 0320    Specimen:  Blood from Arm, Right Updated:  12/16/19 0331    Blood Culture - Blood, Arm, Left [[id number]] Collected:  12/16/19 0320    Specimen:  Blood from Arm, Left Updated:  12/16/19 0330    Procalcitonin [[id number]]  (Abnormal) Collected:  12/16/19 0320    Specimen:  Blood Updated:  12/16/19 0402     Procalcitonin 0.06 ng/mL     Narrative:       As a Marker for Sepsis (Non-Neonates):   1. <0.5 ng/mL represents a low risk of severe sepsis and/or septic shock.  1. >2 ng/mL represents a high risk of severe sepsis and/or septic shock.    As a Marker for Lower Respiratory Tract Infections that require antibiotic therapy:  PCT on Admission     Antibiotic Therapy             6-12 Hrs later  > 0.5                Strongly Recommended            >0.25 - <0.5         Recommended  0.1 - 0.25           Discouraged                   Remeasure/reassess PCT  <0.1                 Strongly Discouraged          Remeasure/reassess PCT      As 28 day mortality risk marker: \"Change in Procalcitonin Result\" (> 80 % or <=80 %) if Day 0 (or Day 1) and Day 4 values are available. Refer to http://www.[employer]-pct-calculator.com/   Change in PCT <=80 %   A decrease of PCT levels below or equal to 80 % defines a positive change in PCT test result representing a higher risk for 28-day all-cause mortality of patients diagnosed with severe sepsis or septic shock.  Change in PCT > 80 %   A decrease of PCT levels of more than 80 % defines a negative change in PCT result representing a " lower risk for 28-day all-cause mortality of patients diagnosed with severe sepsis or septic shock.                        I ordered the above labs and reviewed the results    RADIOLOGY  CT Angiogram Chest   Final Result       1. No acute pulmonary thromboembolus seen.   2. Multifocal infiltrates are identified within both lungs. These are   most significant at the left lung base, and are concerning for   multifocal pneumonia. There is also a trace left pleural effusion.   3. The patient does have some interlobular septal thickening, the   possibility of some superimposed vascular congestion isn't excluded.   4. Somewhat irregularly marginated nodule identified within the left   upper lobe is indeterminate. Given background emphysematous changes,   repeat chest CT in 3-6 months is suggested. Furthermore, the patient   appears to have an enlarged right hilar node. Attention to this node and   the left upper lobe nodule, as well as the patient's bilateral pulmonary   infiltrates, is suggested.        Radiation dose reduction techniques were utilized, including automated   exposure control and exposure modulation based on body size.       This report was finalized on 12/16/2019 5:12 AM by Dr. Alyce Rodriguez M.D.          XR Chest 2 View   Final Result   Possible left basilar pneumonia. Short-term follow-up exam to document   resolution is recommended.       This report was finalized on 12/16/2019 2:30 AM by Dr. Alyce Rodriguez M.D.               I ordered the above noted radiological studies. Interpreted by radiologist. Reviewed by me in PACS.       PROCEDURES  Procedures  EKG          EKG time: 0255  Rhythm/Rate: NSR 71  P waves and LA: nml  QRS, axis: nml   ST and T waves: nml     Interpreted Contemporaneously by me, independently viewed  No prior for comparison.       PROGRESS AND CONSULTS     0149: Upon pt exam, discussed plan for lab workup and CXR in ED for further evaluation. Offered pt breathing  treatment at this time due to wheezing upon exam.     0151: Labs and CXR ordered for further evaluation. Proventil ordered for breathing treatment.     0242: Levaquin ordered for abx treatment.     0357: CTA chest ordered for further evaluation due to elevated d-dimer.     0358: Pt rechecked and resting comfortably. Discussed with pt the evidence of pna on CXR, but plan for CTA chest due to elevated d-dimer as well for rule out of PE. Informed pt of elevation of sepsis markers and plan for admission and abx treatment. Pt understands and agrees with the plan, all questions answered.    0515: Discussed pt's case with HUANG Hitchcock ([facility]) who agreed to admit pt to telemetry for further abx treatment and workup. Discussed with Ioana the plan to start pt on anticoagulation if pt has a PE found on CT, aware of waiting for CT report.     0519: Reviewed pt's CTA, no evidence of PE. Pt will not be started on anticoagulation.       MEDICAL DECISION MAKING  Results were reviewed/discussed with the patient and they were also made aware of online access. Pt also made aware that some labs, such as cultures, will not be resulted during ER visit and follow up with PMD is necessary.     MDM  Number of Diagnoses or Management Options     Amount and/or Complexity of Data Reviewed  Clinical lab tests: ordered and reviewed (WBC - 13.47  D-dimer - 1.68  BNP - 1,734.0  Lactate - 2.4)  Tests in the radiology section of CPT®: reviewed and ordered (CXR - possible L basilar pneumonia)  Tests in the medicine section of CPT®: ordered and reviewed (See note)  Discuss the patient with other providers: yes (HUANG Hitchcock ([facility]))           DIAGNOSIS  Final diagnoses:   Pneumonia of left lower lobe due to infectious organism (CMS/HCC)       DISPOSITION  ADMISSION    Discussed treatment plan and reason for admission with pt/family and admitting physician.  Pt/family voiced understanding of the plan for admission for further  testing/treatment as needed.       Latest Documented Vital Signs:  As of 5:20 AM  BP- 162/86 HR- 72 Temp- 99.4 °F (37.4 °C) (Tympanic) O2 sat- 95%    --  Documentation assistance provided by carl Kennedy for Dr. Wallace.  Information recorded by the scribe was done at my direction and has been verified and validated by me.               Jessica Kennedy  12/16/19 0520       Destin Wallace MD  12/16/19 0536

## 2019-12-16 NOTE — THERAPY EVALUATION
Patient Name: Ada Ngo  : 1960    MRN: [id number]                              Today's Date: 2019       Admit Date: 2019    Visit Dx:     ICD-10-CM ICD-9-CM   1. Pneumonia of left lower lobe due to infectious organism (CMS/HCC) J18.1 486     Patient Active Problem List   Diagnosis   • Benzodiazepine dependence (CMS/HCC)   • Psychosis (CMS/HCC)   • Sarcoidosis   • Drug-seeking behavior   • Chronic midline low back pain without sciatica   • Other insomnia   • Essential hypertension   • Pneumonia of left lower lobe due to infectious organism (CMS/HCC)   • Alcoholism /alcohol abuse (CMS/HCC)   • Tobacco dependence     Past Medical History:   Diagnosis Date   • Coronary artery disease    • Hypertension    • Pulmonary emboli (CMS/HCC)    • Stroke (CMS/HCC)      Past Surgical History:   Procedure Laterality Date   • OVARIAN CYST REMOVAL     • SINUS SURGERY       General Information     Row Name 19 1540          PT Evaluation Time/Intention    Document Type  evaluation  -     Mode of Treatment  individual therapy;physical therapy  -     Row Name 19 1547          General Information    Prior Level of Function  independent:;gait;transfer;bed mobility  -     Existing Precautions/Restrictions  no known precautions/restrictions  -     Barriers to Rehab  medically complex  -     Row Name 19 1540          Relationship/Environment    Lives With  spouse  -     Row Name 19 1540          Resource/Environmental Concerns    Current Living Arrangements  home/apartment/condo  -     Row Name 19 1545          Cognitive Assessment/Intervention- PT/OT    Orientation Status (Cognition)  oriented x 4  -CH       User Key  (r) = Recorded By, (t) = Taken By, (c) = Cosigned By    Initials Name Provider Type    CH Princess Zuleta, PT Physical Therapist        Mobility     Row Name 19 1540          Bed Mobility Assessment/Treatment    Bed Mobility Assessment/Treatment   sit-supine;supine-sit  -     Supine-Sit [place] (Bed Mobility)  supervision  -     Sit-Supine [place] (Bed Mobility)  supervision  -[facility] Name 12/16/19 1541          Sit-Stand Transfer    Sit-Stand [place] (Transfers)  supervision  -[facility] Name 12/16/19 1541          Gait/Stairs Assessment/Training    [place] Level (Gait)  supervision  -     Distance in Feet (Gait)  150  -     Comment (Gait/Stairs)  no LOB or impaired gait noted during ambulation  -       User Key  (r) = Recorded By, (t) = Taken By, (c) = Cosigned By    Initials Name Provider Type     Princess Zuleta, PT Physical Therapist        Obj/Interventions     [facility] Name 12/16/19 1542          General ROM    GENERAL ROM COMMENTS  AROM WFL For age  -[facility] Name 12/16/19 1542          MMT (Manual Muscle Testing)    General MMT Comments  strength appears WFL for age, B LE grossly 4+/5  -[facility] Name 12/16/19 1542          Therapeutic Exercise    Comment (Therapeutic Exercise)  10 reps B LE AROM AP, LAQ, and seated marches  -[facility] Name 12/16/19 1542          Static Standing Balance    Level of [place] (Supported Standing, Static Balance)  supervision  -[facility] Name 12/16/19 1542          Dynamic Standing Balance    Level of [place], Reaches Outside Midline (Standing, Dynamic Balance)  supervision  -       User Key  (r) = Recorded By, (t) = Taken By, (c) = Cosigned By    Initials Name Provider Type     Princess Zuleta, PT Physical Therapist        Goals/Plan    No documentation.       Clinical Impression     [facility] Name 12/16/19 1543          Pain Assessment    Additional Documentation  Pain Scale: Numbers Pre/Post-Treatment (Group)  -CH     Row Name 12/16/19 1543          Pain Scale: Numbers Pre/Post-Treatment    Pain Scale: Numbers, Pretreatment  3/10  -     Pain Scale: Numbers, Post-Treatment  3/10  -     Pain Location  back  -     Pre/Post Treatment Pain Comment  and head ache  -      Pain Intervention(s)  Repositioned  -     Row Name 12/16/19 1543          Plan of Care Review    Plan of Care Reviewed With  patient  -     Row Name 12/16/19 1543          Physical Therapy Clinical Impression    Patient/Family Goals Statement (PT Clinical Impression)  Pt is a 60 yo F who was admitted with PNA and h/o sarcoidosis and ETOH use. Pt demonstrates adequate strength and balance to perform funcitonal mobility and gait independently. Pt reports she has no PT needs at this time. PT will sign off.  -     Criteria for Skilled Interventions Met (PT Clinical Impression)  current level of function same as previous level of function  -     Row Name 12/16/19 1543          Vital Signs    O2 Delivery Pre Treatment  room air  -     Intra SpO2 (%)  96  -CH     O2 Delivery Intra Treatment  room air  -     O2 Delivery Post Treatment  room air  -     Row Name 12/16/19 1543          Positioning and Restraints    Pre-Treatment Position  in bed  -     Post Treatment Position  bed  -     In Bed  supine;call light within reach;encouraged to call for assist;exit alarm on  -       User Key  (r) = Recorded By, (t) = Taken By, (c) = Cosigned By    Initials Name Provider Type     Princess Zuleta, PT Physical Therapist        Outcome Measures     Row Name 12/16/19 1546          How much help from another person do you currently need...    Turning from your back to your side while in flat bed without using bedrails?  4  -CH     Moving from lying on back to sitting on the side of a flat bed without bedrails?  4  -CH     Moving to and from a bed to a chair (including a wheelchair)?  4  -CH     Standing up from a chair using your arms (e.g., wheelchair, bedside chair)?  4  -CH     Climbing 3-5 steps with a railing?  3  -CH     To walk in hospital room?  4  -CH     AM-PAC 6 Clicks Score (PT)  23  -     Row Name 12/16/19 1548          Functional Assessment    Outcome Measure Options  AM-PAC 6 Clicks Basic  Mobility (PT)  -       User Key  (r) = Recorded By, (t) = Taken By, (c) = Cosigned By    Initials Name Provider Type     Princess Zuleta PT Physical Therapist        Physical Therapy Education                 Title: PT OT SLP Therapies (In Progress)     Topic: Physical Therapy (In Progress)     Point: Mobility training (Done)     Description:   Instruct learner(s) on safety and technique for assisting patient out of bed, chair or wheelchair.  Instruct in the proper use of assistive devices, such as walker, crutches, cane or brace.              Patient Friendly Description:   It's important to get you on your feet again, but we need to do so in a way that is safe for you. Falling has serious consequences, and your personal safety is the most important thing of all.        When it's time to get out of bed, one of us or a family member will sit next to you on the bed to give you support.     If your doctor or nurse tells you to use a walker, crutches, a cane, or a brace, be sure you use it every time you get out of bed, even if you think you don't need it.    Learning Progress Summary           Patient Acceptance, E,TB,D, VU,NR by  at 12/16/2019 1547                   Point: Body mechanics (Done)     Description:   Instruct learner(s) on proper positioning and spine alignment for patient and/or caregiver during mobility tasks and/or exercises.              Learning Progress Summary           Patient Acceptance, E,TB,D, VU,NR by  at 12/16/2019 1547                   Point: Precautions (Done)     Description:   Instruct learner(s) on prescribed precautions during mobility and gait tasks              Learning Progress Summary           Patient Acceptance, E,TB,D, VU,NR by  at 12/16/2019 1547                               User Key     Initials Effective Dates Name Provider Type [facility] 04/03/18 -  Princess Zuleta PT Physical Therapist PT              PT Recommendation and Plan     Outcome  Summary/Treatment Plan (PT)  Anticipated Discharge Disposition (PT): home  Plan of Care Reviewed With: patient     Time Calculation:   PT Charges     Row Name 12/16/19 1553             Time Calculation    Start Time  1519  -      Stop Time  1531  -      Time Calculation (min)  12 min  -      PT Received On  12/16/19  -         Time Calculation- PT    Total Timed Code Minutes- PT  8 minute(s)  -        User Key  (r) = Recorded By, (t) = Taken By, (c) = Cosigned By    Initials Name Provider Type     Princess Zuleta, PT Physical Therapist        Therapy Charges for Today     Code Description Service Date Service Provider Modifiers Qty    81339922835 HC PT EVAL LOW COMPLEXITY 1 12/16/2019 Princess Zuleta, PT GP 1    [id number]  PT THER PROC EA 15 MIN 12/16/2019 Princess Zuleta, PT GP 1          PT G-Codes  Outcome Measure Options: AM-PAC 6 Clicks Basic Mobility (PT)  AM-PAC 6 Clicks Score (PT): 23    Princess Zuleta, ANN MARIE  12/16/2019

## 2019-12-16 NOTE — ED NOTES
Pt. Stated feelings of dizziness and weakness during walk to restroom, placed pt. In wheel chair, returned to room, O2 sat: 88%, placed on nasal canula: 2 lpm, saturation raised to 94%, placed on purewick per. Nurse request.     Matthew Bond  12/16/19 3992

## 2019-12-16 NOTE — THERAPY EVALUATION
"Acute Care - Speech Language Pathology   Swallow Initial Evaluation [facility]     Patient Name: Ada Ngo  : 1960  MRN: [id number]  Today's Date: 2019               Admit Date: 2019    Visit Dx:     ICD-10-CM ICD-9-CM   1. Pneumonia of left lower lobe due to infectious organism (CMS/HCC) J18.1 486     Patient Active Problem List   Diagnosis   • Benzodiazepine dependence (CMS/HCC)   • Psychosis (CMS/HCC)   • Sarcoidosis   • Drug-seeking behavior   • Chronic midline low back pain without sciatica   • Other insomnia   • Essential hypertension   • Pneumonia of left lower lobe due to infectious organism (CMS/HCC)   • Alcoholism /alcohol abuse (CMS/HCC)   • Tobacco dependence     Past Medical History:   Diagnosis Date   • Coronary artery disease    • Hypertension    • Pulmonary emboli (CMS/HCC)    • Stroke (CMS/HCC)      Past Surgical History:   Procedure Laterality Date   • OVARIAN CYST REMOVAL     • SINUS SURGERY          SWALLOW EVALUATION (last 72 hours)      [facility] Adult Swallow Evaluation     Row Name [id number]          Subjective Information  no complaints  -OC    Patient Observations  alert;cooperative;agree to therapy  -OC    Patient Effort  good  -OC    Symptoms Noted During/After Treatment  none  -OC          Patient Profile Reviewed  yes  -OC    Pertinent History Of Current Problem  \"59-year-old smoker with a history of sarcoidosis, alcoholism presents with shortness of breath and fever.  She also has a history of alcoholism.\" Pt admitted with LLL PNA.  -OC    Current Method of Nutrition  regular textures;thin liquids  -OC    Precautions/Limitations, Vision  WFL  -OC    Precautions/Limitations, Hearing  WFL  -OC    Prior Level of Function-Swallowing  no diet consistency restrictions  -OC    Plans/Goals Discussed with  patient;agreed upon  -OC    Barriers to Rehab  none identified  -OC    Patient's Goals for Discharge  patient did not state  -OC          Pain Scale: Numbers, " Pretreatment  0/10 - no pain  -OC    Pain Scale: Numbers, Post-Treatment  0/10 - no pain  -OC          Dentition Assessment  edentulous, does not have dentures cannot wear at this time  -OC    Secretion Management  WNL/WFL  -OC    Mucosal Quality  moist, healthy  -OC    Volitional Swallow  WFL  -OC    Volitional Cough  WFL  -OC          Oral Motor General Assessment  WFL  -OC          Oral Prep Phase  WFL  -OC    Oral Transit  WFL  -OC    Oral Residue  WFL  -OC    Pharyngeal Phase  no overt signs/symptoms of pharyngeal impairment  -OC    Clinical Swallow Evaluation Summary  Pt demonstrated no overt s/s aspiration with thin via cup, puree, and mechancial soft. Pt refused dry solid at this time. Functional mastication and no oral residue present.  -OC          SLP Swallowing Diagnosis  mild;oral dysfunction;functional pharyngeal phase  -OC    Functional Impact  no impact on function  -OC    Rehab Potential/Prognosis, Swallowing  good, to achieve stated therapy goals  -OC    Swallow Criteria for Skilled Therapeutic Interventions Met  demonstrates skilled criteria  -OC          Therapy Frequency (Swallow)  evaluation only;other (see comments) VFSS with concern for silent aspiration  -OC    Predicted Duration Therapy Intervention (Days)  until discharge  -OC    SLP Diet Recommendation  soft textures;thin liquids  -OC    Recommended Precautions and Strategies  upright posture during/after eating;small bites of food and sips of liquid  -OC    SLP Rec. for Method of Medication Administration  meds whole;with thin liquids;with pudding or applesauce  -OC    Monitor for Signs of Aspiration  yes;notify SLP if any concerns  -OC    Anticipated Dischage Disposition  unknown  -OC      User Key  (r) = Recorded By, (t) = Taken By, (c) = Cosigned By    Initials Name Effective Dates    OC Apryl Huggins MA,[facility]-SLP 06/08/18 -           EDUCATION  The patient has been educated in the following areas:   Dysphagia (Swallowing  Impairment).    SLP Recommendation and Plan  SLP Swallowing Diagnosis: mild, oral dysfunction, functional pharyngeal phase  SLP Diet Recommendation: soft textures, thin liquids  Recommended Precautions and Strategies: upright posture during/after eating, small bites of food and sips of liquid  SLP Rec. for Method of Medication Administration: meds whole, with thin liquids, with pudding or applesauce     Monitor for Signs of Aspiration: yes, notify SLP if any concerns     Swallow Criteria for Skilled Therapeutic Interventions Met: demonstrates skilled criteria  Anticipated Dischage Disposition: unknown  Rehab Potential/Prognosis, Swallowing: good, to achieve stated therapy goals  Therapy Frequency (Swallow): evaluation only, other (see comments)(VFSS with concern for silent aspiration)  Predicted Duration Therapy Intervention (Days): until discharge       Plan of Care Reviewed With: patient         SLP Outcome Measures (last 72 hours)      SLP Outcome Measures     Row Name 12/16/19 1500             SLP Outcome Measures    Outcome Measure Used?  Adult NOMS  -OC         Adult FCM Scores    FCM Chosen  Swallowing  -OC      Swallowing FCM Score  5  -OC        User Key  (r) = Recorded By, (t) = Taken By, (c) = Cosigned By    Initials Name Effective Dates    Apryl Parker MA,[facility]-SLP 06/08/18 -            Time Calculation:   Time Calculation- SLP     Row Name 12/16/19 1609             Time Calculation- SLP    SLP Start Time  1400  -OC      SLP Received On  12/16/19  -OC        User Key  (r) = Recorded By, (t) = Taken By, (c) = Cosigned By    Initials Name Provider Type    OC Apryl Huggins MA,CCC-SLP Speech and Language Pathologist          Therapy Charges for Today     Code Description Service Date Service Provider Modifiers Qty    [id number]  ST EVAL ORAL PHARYNG SWALLOW 3 12/16/2019 Apryl Huggins MA,CCC-SLP GN 1               Apryl Huggins MA,MARCELL-SLP  12/16/2019

## 2019-12-16 NOTE — CONSULTS
"Access Center consulted regarding ETOH.  Patient evaluated alone in room 514, admitted for PNA.  Upon entering room, she is awake and alert resting in bed, watching TV.  She is alert and oriented x 4.  She is calm, pleasant, and cooperative, soft spoken.     She is a 60 yo M/W/F.  She has been  x 30 years to , [place].  She has no children of her own, but 2 step sons.  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  Because she was in school and not working at the time of her diagnosis, she was unable to obtain disability.    She reports a one year history of drinking in order to sleep stating she will go days without sleeping then will have to resort to alcohol.  She reports drinking 2-3 x week two thirds of a fifth of either vodka or gin.  Her last drink was 2 days ago.  She was following with a psychiatrist at  and was being treated with Diazepam (10 mg q 6 hours) and Temazepam (30 mg HS) which were beneficial.  She reports her psychiatrist \"dumped\" her after his rights to Rx medications were taken away.  She has an appointment with a psychiatrist next week.  She reports history of anxiety disorder.      She denies hx of withdrawal but has been experiencing seizures.  She feels the seizures are not ETOH related but are related to sleep deprivation.  She is confident that she does not have a seizure d/o.  She has seen a neurologist however stated no EEG has been performed and neuro wants her to \"consider\" starting an anticonvulsant, which she feels would \"be like putting a band aid\" on the problem as she feels the issue is her insomnia.  She has also experienced hallucinations but again feels was relate to sleep deprivation.      She feels her insomnia is getting worse and she will not be able to stop drinking until the insomnia is treated.  She reports that the only medications that help are the " diazepam and temazepam together.  She has tried Vistaril, Haldol, Remeron, Ambien, Trazodone, and Amitriptyline (which she reports cannot take secondary to cardiac issues) in the past without help.  Per chart has a history of benzo dependence and drug seeking behaviors.      She denies SI, wish to be dead (wants to be able to sleep), and denies intention to harm/kill self however feels she has lost quality of life and cannot continue to live with insomnia and realizes drinking is not a good choice.  She stated she has looked into assisted suicide and would consider going to [place] with her 's support (as he is ) if insomnia continues.  She denies HI, current A/V hallucinations.  She denies issues with appetite but has to eat soft food related to no teeth.        Could consider consult with Dr. Avalos regarding medications however patient stated she only wants to be treated with Diazepam and Temazepam and does not want to see psychiatrist here if he will not Rx these medications.  She was given resources for other outpatient psych services as she feels the MD she is to see next week will not take her insurance.        RN and MD Patel informed.  AC following.

## 2019-12-16 NOTE — ED NOTES
Nursing report ED to floor  Ada Ngo  59 y.o.  female    HPI (triage note):   Chief Complaint   Patient presents with   • Flu Symptoms   • Chest Pain       Admitting doctor:   Fred Wilkes MD    Admitting diagnosis:   The encounter diagnosis was Pneumonia of left lower lobe due to infectious organism (CMS/HCC).    Code status:   Current Code Status     Date Active Code Status Order ID Comments User Context       12/16/2019 0519 CPR [id number]  English, Lenora ONOFRE, APRN ED       Questions for Current Code Status     Question Answer Comment    Code Status CPR     Medical Interventions (Level of Support Prior to Arrest) Full           Allergies:   Augmentin [amoxicillin-pot clavulanate]; Cephalosporins; Zyprexa [olanzapine]; Amoxicillin; and Zolpidem    Weight:       12/16/19  0214   Weight: 77.5 kg (170 lb 12.8 oz)       Most recent vitals:   Vitals:    12/16/19 0323 12/16/19 0350 12/16/19 0529 12/16/19 0550   BP: 178/88 162/86  160/83   Pulse:   83 83   Resp:       Temp:       TempSrc:       SpO2: 92% 95% 93% 95%   Weight:       Height:           Active LDAs/IV Access:   Lines, Drains & Airways    Active LDAs     Name:   Placement date:   Placement time:   Site:   Days:    Peripheral IV 12/16/19 Left Hand   12/16/19    --    Hand   less than 1    Peripheral IV 12/16/19 0324 Right Antecubital   12/16/19 0324    Antecubital   less than 1                Labs (abnormal labs have a star):   Labs Reviewed   COMPREHENSIVE METABOLIC PANEL - Abnormal; Notable for the following components:       Result Value    Glucose 140 (*)     Anion Gap 17.5 (*)     All other components within normal limits    Narrative:     GFR Normal >60  Chronic Kidney Disease <60  Kidney Failure <15     D-DIMER, QUANTITATIVE - Abnormal; Notable for the following components:    D-Dimer, Quantitative 1.68 (*)     All other components within normal limits    Narrative:     The Stago D-Dimer test used in conjunction with a clinical pretest  "probability (PTP) assessment model, has been approved by the FDA to rule out the presence of venous thromboembolism (VTE) in outpatients suspected of deep venous thrombosis (DVT) or pulmonary embolism (PE). The cut-off for negative predictive value is <0.50 MCGFEU/mL.   BNP (IN-HOUSE) - Abnormal; Notable for the following components:    proBNP 1,734.0 (*)     All other components within normal limits    Narrative:     Among patients with dyspnea, NT-proBNP is highly sensitive for the detection of acute congestive heart failure. In addition NT-proBNP of <300 pg/ml effectively rules out acute congestive heart failure with 99% negative predictive value.     LACTIC ACID, PLASMA - Abnormal; Notable for the following components:    Lactate 2.4 (*)     All other components within normal limits   CBC WITH AUTO DIFFERENTIAL - Abnormal; Notable for the following components:    WBC 13.47 (*)     RDW 16.6 (*)     Neutrophil % 80.1 (*)     Lymphocyte % 13.2 (*)     Monocyte % 3.9 (*)     Immature Grans % 0.6 (*)     Neutrophils, Absolute 10.79 (*)     Immature Grans, Absolute 0.08 (*)     All other components within normal limits   PROCALCITONIN - Abnormal; Notable for the following components:    Procalcitonin 0.06 (*)     All other components within normal limits    Narrative:     As a Marker for Sepsis (Non-Neonates):   1. <0.5 ng/mL represents a low risk of severe sepsis and/or septic shock.  1. >2 ng/mL represents a high risk of severe sepsis and/or septic shock.    As a Marker for Lower Respiratory Tract Infections that require antibiotic therapy:  PCT on Admission     Antibiotic Therapy             6-12 Hrs later  > 0.5                Strongly Recommended            >0.25 - <0.5         Recommended  0.1 - 0.25           Discouraged                   Remeasure/reassess PCT  <0.1                 Strongly Discouraged          Remeasure/reassess PCT      As 28 day mortality risk marker: \"Change in Procalcitonin Result\" (> 80 " % or <=80 %) if Day 0 (or Day 1) and Day 4 values are available. Refer to http://www.[facility]-pct-calculator.com/   Change in PCT <=80 %   A decrease of PCT levels below or equal to 80 % defines a positive change in PCT test result representing a higher risk for 28-day all-cause mortality of patients diagnosed with severe sepsis or septic shock.  Change in PCT > 80 %   A decrease of PCT levels of more than 80 % defines a negative change in PCT result representing a lower risk for 28-day all-cause mortality of patients diagnosed with severe sepsis or septic shock.                 PROTIME-INR - Normal   TROPONIN (IN-HOUSE) - Normal    Narrative:     Troponin T Reference Range:  <= 0.03 ng/mL-   Negative for AMI  >0.03 ng/mL-     Abnormal for myocardial necrosis.  Clinicians would have to utilize clinical acumen, EKG, Troponin and serial changes to determine if it is an Acute Myocardial Infarction or myocardial injury due to an underlying chronic condition.    BLOOD CULTURE   BLOOD CULTURE   RESPIRATORY CULTURE   LEGIONELLA ANTIGEN, URINE   RESPIRATORY PANEL, PCR   STREP PNEUMO AG, URINE OR CSF   LACTIC ACID REFLEX TIMER   BASIC METABOLIC PANEL   CBC (NO DIFF)   LACTIC ACID, REFLEX   CBC AND DIFFERENTIAL    Narrative:     The following orders were created for panel order CBC & Differential.  Procedure                               Abnormality         Status                     ---------                               -----------         ------                     CBC Auto Differential[[id number]]        Abnormal            Final result                 Please view results for these tests on the individual orders.       EKG:   ECG 12 Lead   Preliminary Result   HEART RATE= 71  bpm   RR Interval= 844  ms   FL Interval= 153  ms   P Horizontal Axis= -4  deg   P Front Axis= 71  deg   QRSD Interval= 84  ms   QT Interval= 419  ms   QRS Axis= 71  deg   T Wave Axis= 77  deg   - NORMAL ECG -   Sinus rhythm   Electronically Signed By:     Date and Time of Study: 2019-12-16 02:55:54          Meds given in ED:   Medications   sodium chloride 0.9 % flush 10 mL (has no administration in time range)   sodium chloride 0.9 % infusion (125 mL/hr Intravenous New Bag 12/16/19 0328)   sodium chloride 0.9 % flush 10 mL (has no administration in time range)   sodium chloride 0.9 % flush 10 mL (has no administration in time range)   acetaminophen (TYLENOL) tablet 650 mg (has no administration in time range)     Or   acetaminophen (TYLENOL) 160 MG/5ML solution 650 mg (has no administration in time range)     Or   acetaminophen (TYLENOL) suppository 650 mg (has no administration in time range)   ipratropium-albuterol (DUO-NEB) nebulizer solution 3 mL (has no administration in time range)   ondansetron (ZOFRAN) injection 4 mg (4 mg Intravenous Given 12/16/19 0525)   albuterol (PROVENTIL) nebulizer solution 0.083% 2.5 mg/3mL (2.5 mg Nebulization Given 12/16/19 0258)   levoFLOXacin (LEVAQUIN) 750 mg/150 mL D5W (premix) (LEVAQUIN) 750 mg (0 mg Intravenous Stopped 12/16/19 0529)   iopamidol (ISOVUE-370) 76 % injection 100 mL (95 mL Intravenous Given by Other 12/16/19 4905)       Imaging results:  Xr Chest 2 View    Result Date: 12/16/2019  Possible left basilar pneumonia. Short-term follow-up exam to document resolution is recommended.  This report was finalized on 12/16/2019 2:30 AM by Dr. Alyce Rodriguez M.D.      Ct Angiogram Chest    Result Date: 12/16/2019   1. No acute pulmonary thromboembolus seen. 2. Multifocal infiltrates are identified within both lungs. These are most significant at the left lung base, and are concerning for multifocal pneumonia. There is also a trace left pleural effusion. 3. The patient does have some interlobular septal thickening, the possibility of some superimposed vascular congestion isn't excluded. 4. Somewhat irregularly marginated nodule identified within the left upper lobe is indeterminate. Given background emphysematous  changes, repeat chest CT in 3-6 months is suggested. Furthermore, the patient appears to have an enlarged right hilar node. Attention to this node and the left upper lobe nodule, as well as the patient's bilateral pulmonary infiltrates, is suggested.  Radiation dose reduction techniques were utilized, including automated exposure control and exposure modulation based on body size.  This report was finalized on 12/16/2019 5:12 AM by Dr. Alyce Rodriguez M.D.        Ambulatory status:   - with assist    Social issues:   Social History     Socioeconomic History   • Marital status:      Spouse name: Not on file   • Number of children: Not on file   • Years of education: Not on file   • Highest education level: Not on file   Tobacco Use   • Smoking status: Current Every Day Smoker     Packs/day: 2.00     Types: Cigarettes   • Smokeless tobacco: Never Used   Substance and Sexual Activity   • Alcohol use: No     Frequency: Never   • Drug use: Defer   • Sexual activity: Defer        Fabiola Hong RN  12/16/19 0610

## 2019-12-16 NOTE — H&P
Patient Name:  Ada Ngo  YOB: 1960  MRN:  [id number]  Admit Date:  12/16/2019  Patient Care Team:  Trenton Cerrato Jr.,  as PCP - General (Family Medicine)      Subjective   History Present Illness     Chief Complaint   Patient presents with   • Flu Symptoms   • Chest Pain       Ms. Ngo is a 59 y.o. smoker with a history of sarcoidosis, CAD, alcoholism, HTN that presents to [facility] complaining of worsening cough, dyspnea, fever. She reports chronic cough that has worsened over the past several weeks associated w/worsening shortness of breath. She reports fever at home. She's also had sinus congestion, runny nose, wheezing, and chest tightness. She also reports chronic insomnia described as inability to sleep x 4-5 days at a time that leads her to consume large amount of alcohol. She reports having seizures in past.     ED workup revealed elevated D-Dimer. CTA chest was negative for PE but showed multifocal infiltrates mostly in lt lung base. Lactic Acid elevated 2.4, WBC 13, Procal 13.47. Blood cultures, sputum cult, resp viral panel, legionella & strep pneumo pending. She was started on IV Levaquin and nebulizer treatments.    On exam, dyspnea has improved but she continues to complain of chest tightness. No coughing was present. She denies chills, myalgias, sore throat.     Review of Systems   Constitutional: Negative for chills.   HENT: Positive for congestion and rhinorrhea.    Respiratory: Positive for chest tightness, shortness of breath and wheezing.    Cardiovascular: Negative for chest pain and palpitations.   Gastrointestinal: Negative.    Genitourinary: Negative.    Musculoskeletal: Negative.    Skin: Negative.    Neurological: Negative.    Psychiatric/Behavioral: Positive for sleep disturbance.        Personal History     Past Medical History:   Diagnosis Date   • Coronary artery disease    • Hypertension    • Pulmonary emboli (CMS/HCC)    • Stroke  (CMS/[facility])      Past Surgical History:   Procedure Laterality Date   • OVARIAN CYST REMOVAL     • SINUS SURGERY       Family History   Problem Relation Age of Onset   • Heart attack Mother    • Heart disease Mother    • Heart disease Father      Social History     Tobacco Use   • Smoking status: Current Every Day Smoker     Packs/day: 2.00     Types: Cigarettes   • Smokeless tobacco: Never Used   Substance Use Topics   • Alcohol use: No     Frequency: Never   • Drug use: Defer     Smokes 2 PPD x 35 yrs. Drinks large amount Vodka on 3-4 day binges; exact amount unknown.     No current facility-administered medications on file prior to encounter.      Current Outpatient Medications on File Prior to Encounter   Medication Sig Dispense Refill   • aspirin 325 MG tablet Take 325 mg by mouth Daily.     • fexofenadine (ALLEGRA) 60 MG tablet Take 60 mg by mouth Daily.     • metoprolol tartrate (LOPRESSOR) 25 MG tablet Take 25 mg by mouth 2 (Two) Times a Day.  10     Allergies   Allergen Reactions   • Augmentin [Amoxicillin-Pot Clavulanate] Anaphylaxis   • Cephalosporins Anaphylaxis   • Zyprexa [Olanzapine] Anaphylaxis   • Amoxicillin Unknown (See Comments)   • Zolpidem Unknown (See Comments)       Objective    Objective     Vital Signs  Temp:  [98.1 °F (36.7 °C)-99.4 °F (37.4 °C)] 98.1 °F (36.7 °C)  Heart Rate:  [72-98] 80  Resp:  [18-20] 18  BP: (160-178)/() 164/82  SpO2:  [92 %-100 %] 92 %  on  Flow (L/min):  [2] 2;   Device (Oxygen Therapy): room air  Body mass index is 25.22 kg/m².    Physical Exam   Constitutional: She is oriented to person, place, and time. She appears well-developed. No distress.   HENT:   Head: Normocephalic and atraumatic.   Eyes: EOM are normal.   Neck: Normal range of motion. No JVD present.   Cardiovascular: Normal rate and regular rhythm.   Pulmonary/Chest: Effort normal. She has wheezes.   Mild expiratory wheeze lt lung   Abdominal: Soft. Bowel sounds are normal.   Musculoskeletal: She  exhibits no edema.   Neurological: She is alert and oriented to person, place, and time.   No focal deficit   Skin: Skin is warm and dry.   Psychiatric: She has a normal mood and affect.       Results Review:  I reviewed the patient's new clinical results.  I reviewed the patient's new imaging results and agree with the interpretation.  I reviewed the patient's other test results and agree with the interpretation  I personally viewed and interpreted the patient's EKG/Telemetry data    Lab Results (last 24 hours)     Procedure Component Value Units Date/Time    CBC & Differential [[id number]] Collected:  12/16/19 0216    Specimen:  Blood Updated:  12/16/19 0227    Narrative:       The following orders were created for panel order CBC & Differential.  Procedure                               Abnormality         Status                     ---------                               -----------         ------                     CBC Auto Differential[[id number]]        Abnormal            Final result                 Please view results for these tests on the individual orders.    Protime-INR [[id number]]  (Normal) Collected:  12/16/19 0216    Specimen:  Blood Updated:  12/16/19 0242     Protime 12.3 Seconds      INR 0.94    D-dimer, Quantitative [[id number]]  (Abnormal) Collected:  12/16/19 0216    Specimen:  Blood Updated:  12/16/19 0244     D-Dimer, Quantitative 1.68 MCGFEU/mL     Narrative:       The Stago D-Dimer test used in conjunction with a clinical pretest probability (PTP) assessment model, has been approved by the FDA to rule out the presence of venous thromboembolism (VTE) in outpatients suspected of deep venous thrombosis (DVT) or pulmonary embolism (PE). The cut-off for negative predictive value is <0.50 MCGFEU/mL.    BNP [[id number]]  (Abnormal) Collected:  12/16/19 0216    Specimen:  Blood Updated:  12/16/19 0249     proBNP 1,734.0 pg/mL     Narrative:       Among patients with dyspnea, NT-proBNP is highly  sensitive for the detection of acute congestive heart failure. In addition NT-proBNP of <300 pg/ml effectively rules out acute congestive heart failure with 99% negative predictive value.      Lactic Acid, Plasma [[id number]]  (Abnormal) Collected:  12/16/19 0216    Specimen:  Blood Updated:  12/16/19 0249     Lactate 2.4 mmol/L     CBC Auto Differential [[id number]]  (Abnormal) Collected:  12/16/19 0216    Specimen:  Blood Updated:  12/16/19 0227     WBC 13.47 10*3/mm3      RBC 4.66 10*6/mm3      Hemoglobin 13.3 g/dL      Hematocrit 39.9 %      MCV 85.6 fL      MCH 28.5 pg      MCHC 33.3 g/dL      RDW 16.6 %      RDW-SD 51.2 fl      MPV 9.7 fL      Platelets 336 10*3/mm3      Neutrophil % 80.1 %      Lymphocyte % 13.2 %      Monocyte % 3.9 %      Eosinophil % 1.5 %      Basophil % 0.7 %      Immature Grans % 0.6 %      Neutrophils, Absolute 10.79 10*3/mm3      Lymphocytes, Absolute 1.78 10*3/mm3      Monocytes, Absolute 0.53 10*3/mm3      Eosinophils, Absolute 0.20 10*3/mm3      Basophils, Absolute 0.09 10*3/mm3      Immature Grans, Absolute 0.08 10*3/mm3      nRBC 0.0 /100 WBC     Lactic Acid, Reflex Timer (This will reflex a repeat order 3-3:15 hours after ordered.) [[id number]] Collected:  12/16/19 0216    Specimen:  Blood Updated:  12/16/19 0600     Extra Tube Hold for add-ons.     Comment: Auto resulted.       Comprehensive Metabolic Panel [[id number]]  (Abnormal) Collected:  12/16/19 0320    Specimen:  Blood Updated:  12/16/19 0355     Glucose 140 mg/dL      BUN 11 mg/dL      Creatinine 0.62 mg/dL      Sodium 139 mmol/L      Potassium 3.8 mmol/L      Chloride 98 mmol/L      CO2 23.5 mmol/L      Calcium 9.7 mg/dL      Total Protein 8.3 g/dL      Albumin 4.60 g/dL      ALT (SGPT) 9 U/L      AST (SGOT) 15 U/L      Alkaline Phosphatase 107 U/L      Total Bilirubin 0.6 mg/dL      eGFR Non African Amer 98 mL/min/1.73      Globulin 3.7 gm/dL      A/G Ratio 1.2 g/dL      BUN/Creatinine Ratio 17.7     Anion Gap 17.5  "mmol/L     Narrative:       GFR Normal >60  Chronic Kidney Disease <60  Kidney Failure <15      Troponin [[id number]]  (Normal) Collected:  12/16/19 0320    Specimen:  Blood Updated:  12/16/19 0402     Troponin T <0.010 ng/mL     Narrative:       Troponin T Reference Range:  <= 0.03 ng/mL-   Negative for AMI  >0.03 ng/mL-     Abnormal for myocardial necrosis.  Clinicians would have to utilize clinical acumen, EKG, Troponin and serial changes to determine if it is an Acute Myocardial Infarction or myocardial injury due to an underlying chronic condition.     Blood Culture - Blood, Arm, Right [[id number]] Collected:  12/16/19 0320    Specimen:  Blood from Arm, Right Updated:  12/16/19 0331    Blood Culture - Blood, Arm, Left [[id number]] Collected:  12/16/19 0320    Specimen:  Blood from Arm, Left Updated:  12/16/19 0330    Procalcitonin [[id number]]  (Abnormal) Collected:  12/16/19 0320    Specimen:  Blood Updated:  12/16/19 0402     Procalcitonin 0.06 ng/mL     Narrative:       As a Marker for Sepsis (Non-Neonates):   1. <0.5 ng/mL represents a low risk of severe sepsis and/or septic shock.  1. >2 ng/mL represents a high risk of severe sepsis and/or septic shock.    As a Marker for Lower Respiratory Tract Infections that require antibiotic therapy:  PCT on Admission     Antibiotic Therapy             6-12 Hrs later  > 0.5                Strongly Recommended            >0.25 - <0.5         Recommended  0.1 - 0.25           Discouraged                   Remeasure/reassess PCT  <0.1                 Strongly Discouraged          Remeasure/reassess PCT      As 28 day mortality risk marker: \"Change in Procalcitonin Result\" (> 80 % or <=80 %) if Day 0 (or Day 1) and Day 4 values are available. Refer to http://www.[employer]-pct-calculator.com/   Change in PCT <=80 %   A decrease of PCT levels below or equal to 80 % defines a positive change in PCT test result representing a higher risk for 28-day all-cause mortality of patients " diagnosed with severe sepsis or septic shock.  Change in PCT > 80 %   A decrease of PCT levels of more than 80 % defines a negative change in PCT result representing a lower risk for 28-day all-cause mortality of patients diagnosed with severe sepsis or septic shock.                  Basic Metabolic Panel [[id number]]  (Abnormal) Collected:  12/16/19 0715    Specimen:  Blood Updated:  12/16/19 0757     Glucose 130 mg/dL      BUN 9 mg/dL      Creatinine 0.50 mg/dL      Sodium 137 mmol/L      Potassium 3.8 mmol/L      Chloride 98 mmol/L      CO2 22.4 mmol/L      Calcium 8.9 mg/dL      eGFR Non African Amer 125 mL/min/1.73      BUN/Creatinine Ratio 18.0     Anion Gap 16.6 mmol/L     Narrative:       GFR Normal >60  Chronic Kidney Disease <60  Kidney Failure <15      CBC (No Diff) [[id number]]  (Abnormal) Collected:  12/16/19 0715    Specimen:  Blood Updated:  12/16/19 0728     WBC 10.79 10*3/mm3      RBC 4.55 10*6/mm3      Hemoglobin 12.8 g/dL      Hematocrit 38.2 %      MCV 84.0 fL      MCH 28.1 pg      MCHC 33.5 g/dL      RDW 16.5 %      RDW-SD 50.6 fl      MPV 9.5 fL      Platelets 301 10*3/mm3     Lactic Acid, Reflex [[id number]]  (Normal) Collected:  12/16/19 0715    Specimen:  Blood Updated:  12/16/19 0749     Lactate 2.0 mmol/L     Respiratory Panel, PCR - Swab, Nasopharynx [[id number]] Collected:  12/16/19 0830    Specimen:  Swab from Nasopharynx Updated:  12/16/19 0856    Legionella Antigen, Urine - Urine, Urine, Clean Catch [[id number]] Collected:  12/16/19 0924    Specimen:  Urine, Clean Catch Updated:  12/16/19 0932    S. Pneumo Ag Urine or CSF - Urine, Urine, Clean Catch [[id number]] Collected:  12/16/19 0924    Specimen:  Urine, Clean Catch Updated:  12/16/19 0932          Imaging Results (Last 24 Hours)     Procedure Component Value Units Date/Time    CT Angiogram Chest [[id number]] Collected:  12/16/19 0502     Updated:  12/16/19 0515    Narrative:       CT ANGIOGRAM OF THE CHEST     HISTORY: Shortness of  air     COMPARISON: None available.     TECHNIQUE: Axial CT imaging was obtained from the thoracic inlet through  the dome diaphragm. IV contrast was administered. 3-D reformatted images  were obtained.     FINDINGS:  No acute pulmonary thromboembolus is seen. The main pulmonary artery is  enlarged, which can be seen in setting of pulmonary arterial  hypertension. The thoracic aorta measures within normal size limits.  There is no evidence of dissection. The thyroid gland and trachea appear  within normal limits. There is a small hiatal hernia. There is a trace  left pleural effusion. No definite effusion is seen on the right.  Background emphysematous changes are present. Patchy groundglass  infiltrates are seen within the right middle lobe and lingula. More  extensive consolidation is noted at the lung bases bilaterally, left  greater than right. There also appears to be some interlobular septal  thickening, which may reflect vascular congestion. There is irregularity  marginated nodule within the left upper lobe. This measures up to 8 mm  in size. There is an enlarged right hilar node. This measures up to 1.9  x 1.3 cm. Shotty mediastinal lymph nodes are noted. Images through the  upper abdomen do not demonstrate any acute abnormalities. There is  extensive ventral liminal mural thrombus identified within the abdominal  aorta. No acute osseous abnormalities are seen. Compression deformity is  noted at T6. This is age indeterminate. Correlation with history and  point tenderness is suggested. Bilateral breast implants are noted.       Impression:          1. No acute pulmonary thromboembolus seen.  2. Multifocal infiltrates are identified within both lungs. These are  most significant at the left lung base, and are concerning for  multifocal pneumonia. There is also a trace left pleural effusion.  3. The patient does have some interlobular septal thickening, the  possibility of some superimposed vascular  congestion isn't excluded.  4. Somewhat irregularly marginated nodule identified within the left  upper lobe is indeterminate. Given background emphysematous changes,  repeat chest CT in 3-6 months is suggested. Furthermore, the patient  appears to have an enlarged right hilar node. Attention to this node and  the left upper lobe nodule, as well as the patient's bilateral pulmonary  infiltrates, is suggested.      Radiation dose reduction techniques were utilized, including automated  exposure control and exposure modulation based on body size.     This report was finalized on 12/16/2019 5:12 AM by Dr. Alyce Rodriguez M.D.       XR Chest 2 View [[id number]] Collected:  12/16/19 0228     Updated:  12/16/19 0233    Narrative:       PA AND LATERAL CHEST RADIOGRAPH     HISTORY: Shortness of air and productive cough. HISTORY of sarcoidosis     COMPARISON: None available.     FINDINGS:  Heart size is within normal limits. Patient does appear to have fibrotic  changes, although these are in a basilar predominant distribution. There  is a left basilar infiltrate, which may reflect pneumonia. No  pneumothorax or pleural effusion is seen.       Impression:       Possible left basilar pneumonia. Short-term follow-up exam to document  resolution is recommended.     This report was finalized on 12/16/2019 2:30 AM by Dr. Alyce Rodriguez M.D.                  ECG 12 Lead   Preliminary Result   HEART RATE= 71  bpm   RR Interval= 844  ms   MN Interval= 153  ms   P Horizontal Axis= -4  deg   P Front Axis= 71  deg   QRSD Interval= 84  ms   QT Interval= 419  ms   QRS Axis= 71  deg   T Wave Axis= 77  deg   - NORMAL ECG -   Sinus rhythm   Electronically Signed By:    Date and Time of Study: 2019-12-16 02:55:54           Assessment/Plan     Active Hospital Problems    Diagnosis POA   • **Pneumonia of left lower lobe due to infectious organism (CMS/HCC) [J18.1] Yes   • Alcoholism /alcohol abuse (CMS/HCC) [F10.20] Yes   • Tobacco  dependence [F17.200] Yes   • Essential hypertension [I10] Yes     Continue Metoprolol. No side effects.      • Other insomnia [G47.09] Yes     Refer to sleep specialist     • Sarcoidosis [D86.9] Yes     Refer to pulmonology.         Ms. Ngo is a 59 y.o. smoker with a history of sarcoidosis, HTN, alcoholism, CAD who is admitted with Lt lung PNA    Lt lung pneumonia:  -Continue IV Levaquin  -Nebulizer treatments, IS  -Resp viral panel, sputum cult, legionella & strep pneumo pending    Sarcoidosis:  -Pulmonology consult    HTN:  -Metoprolol    CAD:  -ASA    Alcoholism:  -At risk for withdrawal  -CIWA protocol  -Access center to follow    Tobacco dependence:  -Smoking cessation    Will discuss with Dr. Patel    · I discussed the patient's findings and my recommendations with patient.    VTE Prophylaxis - SCDs.  Code Status - Full code.       HUANG So  [facility]  12/16/19  9:52 AM

## 2019-12-16 NOTE — PLAN OF CARE
VSS. Max CIWA 10. Medicated x 2 with CIWA improving afterward. Outwardly pt symptoms appear controlled. Pt verbalizes anxiety and agitation.  Access center saw patient.  Pulmonary consult pending. IVFs changed to lactated ringers. New IV site. Melatonin added for sleep as patient has trouble sleeping at home.

## 2019-12-16 NOTE — PLAN OF CARE
Pt is a 58 yo F who was admitted with PNA and has history of sarcoidosis and ETOH use. Pt demonstrates adequate strength and balance to perform functional mobility and gait independently. Pt reports no PT needs at this time. PT will sign oft. Pt encouraged to get up with nursing and to perform ROM exercises on her own.

## 2019-12-16 NOTE — ED TRIAGE NOTES
"To ER via EMS.  C/o CP.  C/o  Flu - like symptoms, fever, productive cough, chest pain.  Expiratory wheezes per EMS on left.  Alb MN per EMS.  Initial O2 Sat 90% RA.  Pt also c/o headache.  Pt states hx of sarcoidosis and \"infections\".  "

## 2019-12-16 NOTE — PLAN OF CARE
Problem: Patient Care Overview  Goal: Plan of Care Review  Outcome: Ongoing (interventions implemented as appropriate)  Flowsheets (Taken 12/16/2019 160)  Plan of Care Reviewed With: patient  Note:   Bedside swallow eval completed. Recommend mechanical soft and thins. Meds whole with thin or puree. Recommend alert and upright for PO. ST to s/o at this time. May advance to regular as tolerating. Please re-consult for VFSS if silent aspiration is suspected.

## 2019-12-17 LAB
ANION GAP SERPL CALCULATED.3IONS-SCNC: 14.5 MMOL/L (ref 5–15)
BUN BLD-MCNC: 6 MG/DL (ref 8–23)
BUN/CREAT SERPL: 10.3 (ref 7–25)
CALCIUM SPEC-SCNC: 8.4 MG/DL (ref 8.6–10.5)
CHLORIDE SERPL-SCNC: 101 MMOL/L (ref 98–107)
CO2 SERPL-SCNC: 20.5 MMOL/L (ref 22–29)
CREAT BLD-MCNC: 0.58 MG/DL (ref 0.57–1)
D-LACTATE SERPL-SCNC: 1 MMOL/L (ref 0.5–2)
DEPRECATED RDW RBC AUTO: 48 FL (ref 37–54)
ERYTHROCYTE [DISTWIDTH] IN BLOOD BY AUTOMATED COUNT: 15.7 % (ref 12.3–15.4)
GFR SERPL CREATININE-BSD FRML MDRD: 106 ML/MIN/1.73
GLUCOSE BLD-MCNC: 101 MG/DL (ref 65–99)
HCT VFR BLD AUTO: 31.5 % (ref 34–46.6)
HGB BLD-MCNC: 10.6 G/DL (ref 12–15.9)
MCH RBC QN AUTO: 28.5 PG (ref 26.6–33)
MCHC RBC AUTO-ENTMCNC: 33.7 G/DL (ref 31.5–35.7)
MCV RBC AUTO: 84.7 FL (ref 79–97)
PLATELET # BLD AUTO: 207 10*3/MM3 (ref 140–450)
PMV BLD AUTO: 10.1 FL (ref 6–12)
POTASSIUM BLD-SCNC: 3.9 MMOL/L (ref 3.5–5.2)
RBC # BLD AUTO: 3.72 10*6/MM3 (ref 3.77–5.28)
SODIUM BLD-SCNC: 136 MMOL/L (ref 136–145)
WBC NRBC COR # BLD: 5.03 10*3/MM3 (ref 3.4–10.8)

## 2019-12-17 PROCEDURE — 80048 BASIC METABOLIC PNL TOTAL CA: CPT | Performed by: NURSE PRACTITIONER

## 2019-12-17 PROCEDURE — 97165 OT EVAL LOW COMPLEX 30 MIN: CPT

## 2019-12-17 PROCEDURE — 97535 SELF CARE MNGMENT TRAINING: CPT

## 2019-12-17 PROCEDURE — 90686 IIV4 VACC NO PRSV 0.5 ML IM: CPT | Performed by: HOSPITALIST

## 2019-12-17 PROCEDURE — 63710000001 PREDNISONE PER 1 MG: Performed by: INTERNAL MEDICINE

## 2019-12-17 PROCEDURE — 94799 UNLISTED PULMONARY SVC/PX: CPT

## 2019-12-17 PROCEDURE — 25010000002 INFLUENZA VAC SPLIT QUAD 0.5 ML SUSPENSION PREFILLED SYRINGE: Performed by: HOSPITALIST

## 2019-12-17 PROCEDURE — 83605 ASSAY OF LACTIC ACID: CPT | Performed by: NURSE PRACTITIONER

## 2019-12-17 PROCEDURE — 85027 COMPLETE CBC AUTOMATED: CPT | Performed by: NURSE PRACTITIONER

## 2019-12-17 PROCEDURE — G0008 ADMIN INFLUENZA VIRUS VAC: HCPCS | Performed by: HOSPITALIST

## 2019-12-17 RX ORDER — PREDNISONE 20 MG/1
40 TABLET ORAL
Status: DISCONTINUED | OUTPATIENT
Start: 2019-12-17 | End: 2019-12-18 | Stop reason: HOSPADM

## 2019-12-17 RX ADMIN — METOPROLOL TARTRATE 25 MG: 25 TABLET ORAL at 20:51

## 2019-12-17 RX ADMIN — LORAZEPAM 1 MG: 1 TABLET ORAL at 18:42

## 2019-12-17 RX ADMIN — METOPROLOL TARTRATE 25 MG: 25 TABLET ORAL at 09:32

## 2019-12-17 RX ADMIN — SODIUM CHLORIDE, POTASSIUM CHLORIDE, SODIUM LACTATE AND CALCIUM CHLORIDE 75 ML/HR: 600; 310; 30; 20 INJECTION, SOLUTION INTRAVENOUS at 07:49

## 2019-12-17 RX ADMIN — PREDNISONE 40 MG: 20 TABLET ORAL at 18:42

## 2019-12-17 RX ADMIN — IPRATROPIUM BROMIDE AND ALBUTEROL SULFATE 3 ML: 2.5; .5 SOLUTION RESPIRATORY (INHALATION) at 18:53

## 2019-12-17 RX ADMIN — IPRATROPIUM BROMIDE AND ALBUTEROL SULFATE 3 ML: 2.5; .5 SOLUTION RESPIRATORY (INHALATION) at 11:40

## 2019-12-17 RX ADMIN — LORAZEPAM 1 MG: 1 TABLET ORAL at 20:51

## 2019-12-17 RX ADMIN — ASPIRIN 325 MG: 325 TABLET ORAL at 09:32

## 2019-12-17 RX ADMIN — INFLUENZA VIRUS VACCINE 0.5 ML: 15; 15; 15; 15 SUSPENSION INTRAMUSCULAR at 18:42

## 2019-12-17 RX ADMIN — SODIUM CHLORIDE, PRESERVATIVE FREE 10 ML: 5 INJECTION INTRAVENOUS at 13:07

## 2019-12-17 RX ADMIN — LORAZEPAM 1 MG: 1 TABLET ORAL at 13:11

## 2019-12-17 RX ADMIN — IPRATROPIUM BROMIDE AND ALBUTEROL SULFATE 3 ML: 2.5; .5 SOLUTION RESPIRATORY (INHALATION) at 07:31

## 2019-12-17 RX ADMIN — LORAZEPAM 2 MG: 1 TABLET ORAL at 09:33

## 2019-12-17 NOTE — PROGRESS NOTES
Name: Ada Ngo ADMIT: 2019   : 1960  PCP: Trenton Cerrato Jr., DO    MRN: [id number] LOS: 1 days   AGE/SEX: 59 y.o. female  ROOM: [facility]     Subjective   Subjective   Reports feeling better from pulmonary standpoint. Less dyspnea. Productive cough of yellow-green sputum. Denies fever. Complaining of HA, agitation, anxiety, and tremor; improved with ativan per CIWA protocol    Review of Systems   Constitutional: Negative for fever.   Respiratory: Positive for cough. Negative for shortness of breath and wheezing.    Cardiovascular: Negative for chest pain.   Neurological: Positive for tremors and headaches.   Psychiatric/Behavioral: Positive for agitation and sleep disturbance.        Objective   Objective   Vital Signs  Temp:  [97.4 °F (36.3 °C)-98.2 °F (36.8 °C)] 98.2 °F (36.8 °C)  Heart Rate:  [] 76  Resp:  [16-20] 18  BP: (129-153)/(74-90) 132/74  SpO2:  [91 %-97 %] 97 %  on   ;   Device (Oxygen Therapy): room air  Body mass index is 25.22 kg/m².  Physical Exam   Constitutional: She is oriented to person, place, and time. She appears well-developed and well-nourished. No distress.   HENT:   Head: Normocephalic and atraumatic.   Eyes: EOM are normal.   Neck: No JVD present.   Cardiovascular: Normal rate and regular rhythm.   Pulmonary/Chest: Effort normal and breath sounds normal.   Abdominal: Soft. Bowel sounds are normal.   Musculoskeletal: She exhibits no edema.   Neurological: She is alert and oriented to person, place, and time.   Psychiatric: She has a normal mood and affect.       Results Review:       I reviewed the patient's new clinical results.  Results from last 7 days   Lab Units 19  0540 19  0715 19  0216   WBC 10*3/mm3 5.03 10.79 13.47*   HEMOGLOBIN g/dL 10.6* 12.8 13.3   PLATELETS 10*3/mm3 207 301 336     Results from last 7 days   Lab Units 19  0540 19  0715 19  0320   SODIUM mmol/L 136 137 139   POTASSIUM mmol/L 3.9 3.8 3.8    CHLORIDE mmol/L 101 98 98   CO2 mmol/L 20.5* 22.4 23.5   BUN mg/dL 6* 9 11   CREATININE mg/dL 0.58 0.50* 0.62   GLUCOSE mg/dL 101* 130* 140*   Estimated Creatinine Clearance: 124.6 mL/min (by C-G formula based on SCr of 0.58 mg/dL).  Results from last 7 days   Lab Units 12/16/19  0320   ALBUMIN g/dL 4.60   BILIRUBIN mg/dL 0.6   ALK PHOS U/L 107   AST (SGOT) U/L 15   ALT (SGPT) U/L 9     Results from last 7 days   Lab Units 12/17/19  0540 12/16/19  0715 12/16/19  0320   CALCIUM mg/dL 8.4* 8.9 9.7   ALBUMIN g/dL  --   --  4.60     Results from last 7 days   Lab Units 12/17/19  0540 12/16/19  0715 12/16/19  0320 12/16/19  0216   PROCALCITONIN ng/mL  --   --  0.06*  --    LACTATE mmol/L 1.0 2.0  --  2.4*     No results found for: HGBA1C, POCGLU      aspirin 325 mg Oral Daily   influenza vaccine 0.5 mL Intramuscular Once   ipratropium-albuterol 3 mL Nebulization Q6H - RT   metoprolol tartrate 25 mg Oral BID   nicotine 1 patch Transdermal Q24H   sodium chloride 10 mL Intravenous Q12H       lactated ringers 75 mL/hr Last Rate: 75 mL/hr (12/17/19 0749)   Diet Soft Texture; Chopped; Thin; Cardiac       Assessment/Plan     Active Hospital Problems    Diagnosis  POA   • **Pneumonia of left lower lobe due to infectious organism (CMS/HCC) [J18.1]  Yes   • Alcoholism /alcohol abuse (CMS/HCC) [F10.20]  Yes   • Tobacco dependence [F17.200]  Yes   • Essential hypertension [I10]  Yes   • Other insomnia [G47.09]  Yes   • Sarcoidosis [D86.9]  Yes      Resolved Hospital Problems   No resolved problems to display.       59 y.o. female admitted with Pneumonia of left lower lobe due to infectious organism (CMS/HCC).    -Appreciate pulmonology input. Agrees w/Levaquin. Nicotine patch for smoking cessation. Duonebs QID and recommending LAMA/LABA at discharge, Prednisone 40 mg daily. Needs repeat CT chest in 3 months as high risk of malignancy.  -CIWA protocol. Requiring ativan for withdrawal symptoms. Access center to see  -Melatonin added  for sleep  · SCDs for DVT prophylaxis.  · Full code.  · Discussed with patient and Dr. Maki.  · Anticipate discharge home in 1-2 days.      HUANG So  [facility]  12/17/19  1:46 PM

## 2019-12-17 NOTE — THERAPY EVALUATION
Acute Care - Occupational Therapy Initial Evaluation  [facility]     Patient Name: Ada Ngo  : 1960  MRN: [id number]  Today's Date: 2019             Admit Date: 2019       ICD-10-CM ICD-9-CM   1. Pneumonia of left lower lobe due to infectious organism (CMS/HCC) J18.1 486     Patient Active Problem List   Diagnosis   • Benzodiazepine dependence (CMS/HCC)   • Psychosis (CMS/HCC)   • Sarcoidosis   • Drug-seeking behavior   • Chronic midline low back pain without sciatica   • Other insomnia   • Essential hypertension   • Pneumonia of left lower lobe due to infectious organism (CMS/HCC)   • Alcoholism /alcohol abuse (CMS/HCC)   • Tobacco dependence     Past Medical History:   Diagnosis Date   • Coronary artery disease    • Hypertension    • Pulmonary emboli (CMS/HCC)    • Stroke (CMS/HCC)      Past Surgical History:   Procedure Laterality Date   • OVARIAN CYST REMOVAL     • SINUS SURGERY            OT ASSESSMENT FLOWSHEET (last 12 hours)      Occupational Therapy Evaluation     Row Name 19 1142                   OT Evaluation Time/Intention    Document Type  evaluation  -SG        Mode of Treatment  individual therapy;occupational therapy  -SG        Patient Effort  good  -SG           General Information    Patient Profile Reviewed?  yes  -SG        Patient Observations  alert;cooperative;agree to therapy  -SG        Prior Level of Function  independent:  -SG           Cognitive Assessment/Intervention- PT/OT    Orientation Status (Cognition)  oriented x 4  -SG        Follows Commands (Cognition)  WFL  -SG           Bed Mobility Assessment/Treatment    Supine-Sit [place] (Bed Mobility)  supervision  -SG        Sit-Supine [place] (Bed Mobility)  supervision  -SG           ADL Assessment/Intervention    BADL Assessment/Intervention  lower body dressing  -SG           Lower Body Dressing Assessment/Training    Lower Body Dressing [place] Level  lower body dressing  skills;doff;don;socks;supervision  -SG        Lower Body Dressing Position  edge of bed sitting  -SG           BADL Safety/Performance    Impairments, BADL Safety/Performance  endurance/activity tolerance  -SG           General ROM    GENERAL ROM COMMENTS  BUE's WFL AROM  -SG           Static Standing Balance    Level of [place] (Supported Standing, Static Balance)  supervision  -SG           Dynamic Standing Balance    Level of [place], Reaches Outside Midline (Standing, Dynamic Balance)  supervision  -SG           Positioning and Restraints    Pre-Treatment Position  in bed  -SG        Post Treatment Position  bed  -SG        In Bed  supine;call light within reach;encouraged to call for assist;exit alarm on  -SG           Pain Assessment    Additional Documentation  Pain Scale: Word Pre/Post-Treatment (Group)  -SG           Pain Scale: Word Pre/Post-Treatment    Pain: Word Scale, Pretreatment  0 - no pain  -SG        Pain: Word Scale, Post-Treatment  0 - no pain  -SG           Planned OT Interventions    Planned Therapy Interventions (OT Eval)  activity tolerance training;BADL retraining  -SG           OT Goals    Transfer Goal Selection (OT)  transfer, OT goal 1  -SG        Toileting Goal Selection (OT)  toileting, OT goal 1  -SG           Transfer Goal 1 (OT)    Activity/Assistive Device (Transfer Goal 1, OT)  toilet  -SG        [place] Level/Cues Needed (Transfer Goal 1, OT)  supervision required  -SG        Time Frame (Transfer Goal 1, OT)  1 week  -SG        Progress/Outcome (Transfer Goal 1, OT)  goal ongoing  -SG           Toileting Goal 1 (OT)    Activity/Device (Toileting Goal 1, OT)  toileting skills, all  -SG        Ware Level/Cues Needed (Toileting Goal 1, OT)  supervision required  -SG        Time Frame (Toileting Goal 1, OT)  1 week  -SG        Progress/Outcome (Toileting Goal 1, OT)  goal ongoing  -SG           Patient Education Goal (OT)    Activity (Patient Education Goal,  OT)  Pt to state good knowledge for energy conservation and work simplification to assist with adls  -SG        Edwards/Cues/Accuracy (Memory Goal 2, OT)  verbalizes understanding  -SG        Time Frame (Patient Education Goal, OT)  1 week  -SG        Progress/Outcome (Patient Education Goal, OT)  goal ongoing  -SG          User Key  (r) = Recorded By, (t) = Taken By, (c) = Cosigned By    Initials Name Effective Dates    SG Ramonita Leija OTR 06/08/18 -                OT Recommendation and Plan  Planned Therapy Interventions (OT Eval): activity tolerance training, BADL retraining  Plan of Care Review  Plan of Care Reviewed With: patient  Plan of Care Reviewed With: patient  Outcome Summary: Pt presents to OT with decreased strength and endurance for adl. Pt may benefit from OT for safety for adl and energy conservation teaching    Outcome Measures     Row Name 12/17/19 1201             How much help from another is currently needed...    Putting on and taking off regular lower body clothing?  3  -SG      Bathing (including washing, rinsing, and drying)  3  -SG      Toileting (which includes using toilet bed pan or urinal)  3  -SG      Putting on and taking off regular upper body clothing  3  -SG      Taking care of personal grooming (such as brushing teeth)  3  -SG      Eating meals  4  -SG      AM-PAC 6 Clicks Score (OT)  19  -SG         Functional Assessment    Outcome Measure Options  AM-PAC 6 Clicks Daily Activity (OT)  -SG        User Key  (r) = Recorded By, (t) = Taken By, (c) = Cosigned By    Initials Name Provider Type    SG Ramonita Leija OTR Occupational Therapist          Time Calculation:   Time Calculation- OT     Row Name 12/17/19 1208             Time Calculation- OT    OT Start Time  1006  -SG      OT Stop Time  1019  -SG      OT Time Calculation (min)  13 min  -SG      Total Timed Code Minutes- OT  8 minute(s)  -SG      OT Received On  12/17/19  -SG      OT Goal Re-Cert Due Date  12/24/19   -EVELYNE        User Key  (r) = Recorded By, (t) = Taken By, (c) = Cosigned By    Initials Name Provider Type    Ramonita Ash, MARLENER Occupational Therapist        Therapy Charges for Today     Code Description Service Date Service Provider Modifiers Qty    [id number]  OT EVAL LOW COMPLEXITY 2 12/17/2019 Ramonita Leija OTR GO 1    [id number]  OT SELF CARE/MGMT/TRAIN EA 15 MIN 12/17/2019 Ramonita Leija OTR GO 1               COOPER Rangel  12/17/2019

## 2019-12-17 NOTE — PROGRESS NOTES
[facility] attempted follow up with pt. Pt sound asleep. [facility] talked with pt's nurse who states pt may be d/c today. [facility] gave pt outpt psychiatric resources yesterday.

## 2019-12-17 NOTE — PLAN OF CARE
Problem: Patient Care Overview  Goal: Plan of Care Review  Outcome: Ongoing (interventions implemented as appropriate)  Flowsheets  Taken 12/17/2019 4437  Progress: improving  Outcome Summary: prednisone added today. ativan prn for ciWA SCALE. S/L IVF. NO COMPLAINTS. SAFETY MAINTAINED WILL CONTINUE TO MONITOR.  Taken 12/17/2019 0418  Plan of Care Reviewed With: patient

## 2019-12-17 NOTE — PROGRESS NOTES
Discharge Planning Assessment  [facility]     Patient Name: Ada Ngo  MRN: [id number]  Today's Date: 12/17/2019    Admit Date: 12/16/2019    Discharge Needs Assessment     Row Name 12/17/19 1508       Living Environment    Lives With  spouse    Name(s) of Who Lives With Patient  Detlerobles Ngo  [phone]    Current Living Arrangements  home/apartment/condo    Primary Care Provided by  self    Provides Primary Care For  no one    Family Caregiver if Needed  spouse    Family Caregiver Names  Detlef Jeni  [phone]    Quality of Family Relationships  unable to assess    Able to Return to Prior Arrangements  yes       Resource/Environmental Concerns    Resource/Environmental Concerns  financial;reliable transportation    Transportation Concerns  car, none       Transition Planning    Patient/Family Anticipates Transition to  home with family    Patient/Family Anticipated Services at Transition      Transportation Anticipated  public transportation       Discharge Needs Assessment    Readmission Within the Last 30 Days  no previous admission in last 30 days    Concerns to be Addressed  discharge planning    Outpatient/Agency/Support Group Needs  outpatient psychiatric care    Current Discharge Risk  psychiatric illness;substance use/abuse        Discharge Plan     Row Name 12/17/19 4152       Plan    Plan  Home with  and follow-up psychiatric services.     Plan Comments   Met with patient at bedside. Face sheet verified. Prior to admission patient was living with her  in an apartment; she states she is able to perform her own ADL's and does not use any special or adaptive equipment. Patient states she uses the [facility] on [address], denies issues affording or taking her medications.  Patient does not have POA or living will.  Access center met with patient today and provided her with resources.  Patient asked if hospital could pay her $250 co-pay for her psychiatric  appointment on the 27th, and asked about a ride home. Encouraged patient to discuss payment options with her MD, and to review the list of providers given to her from [facility] Staff. In addition, encouraged patient identify a friend or family who might be able to give her a ride home, she said she would try. Discharge goal is to return home with .  Patient needs to follow-up with psychiatrist.  Patient may need Lyft transport home.  Will continue to monitor for new or changing discharge needs.        Destination      Coordination has not been started for this encounter.      Durable Medical Equipment      Coordination has not been started for this encounter.      Dialysis/Infusion      Coordination has not been started for this encounter.      Home Medical Care      Coordination has not been started for this encounter.      Therapy      Coordination has not been started for this encounter.      Community Resources      Coordination has not been started for this encounter.          Demographic Summary     Row Name 12/17/19 1504       General Information    Admission Type  inpatient    Arrived From  emergency department    Referral Source  admission list    Reason for Consult  discharge planning    Preferred Language  English     Used During This Interaction  no       Contact Information    Permission Granted to Share Info With  family/designee Maria Esther Ngo  [phone]        Functional Status     Row Name 12/17/19 1507       Functional Status    Usual Activity Tolerance  moderate    Current Activity Tolerance  moderate       Functional Status, IADL    Medications  independent    Meal Preparation  independent    Housekeeping  independent    Laundry  independent    Shopping  independent       Mental Status    General Appearance WDL  WDL       Mental Status Summary    Recent Changes in Mental Status/Cognitive Functioning  behavior patterns;decision making/judgment;thought patterns        Employment/    Employment Status  disabled        Psychosocial    No documentation.       Abuse/Neglect    No documentation.       Legal    No documentation.       Substance Abuse    No documentation.       Patient Forms    No documentation.           Lesia Simpson RN

## 2019-12-17 NOTE — CONSULTS
Referring Provider: Dr. Patel  Reason for Consultation: Pneumonia/abnormal CT chest    Patient Care Team:  Trenton Cerrato Jr.,  as PCP - General (Family Medicine)    Chief complaint:   Cough and shortness of breath    History of present illness:    Subjective     This is a 59-year-old female patient, heavy smoker (2 packs a day for the last 30 years).  She reported a history of sarcoidosis diagnosed about 20 years ago by skin biopsy and treated with methotrexate which caused her teeth to fall.    She also reported a history of COPD.  However, she is not currently on any inhalers.  At baseline, she has shortness of breath on mild activities and chronic cough. She stated that her cough has gotten worse over the last 3 years.    She presented to the hospital yesterday for worsening cough of 1 day duration, productive of colored phlegm and associated with worsening shortness of breath and feverish sensation.  No chills.  No sick contact.  She has chronic runny nose and denies flulike illness.  No relieving factors.    Labs:  RVP negative; WBC 13; neutrophils 80%; lactic acid 2.4; procalcitonin normal    Review of Systems  Constitutional: Subjective fever but no chills.  No documented fever here.  ENMT: Chronic sinus congestion with postnasal drip and runny nose.  Cardiovascular: No chest pain, palpitation or legs swelling.    Respiratory: See above  Gastrointestinal: No constipation, diarrhea or abdominal pain   Neurology: No headache, weakness, numbness or dizziness.   Musculoskeletal: Joints pain but no swelling or stiffness  Psychiatry: No depression but reported anxiety.  She also reported difficulty sleeping.  She stated that she does not sleep at all all night long for 4-5 days in a row.  She has tried multiple sleeping pills in the past without help.  Genitourinary: No dysuria or frequent urination  Endo: No weight changes. No cold or warm intolerance.  Lymphatic: No swollen  glands.  Integumentary: No rash.    History  Past Medical History:   Diagnosis Date   • Coronary artery disease    • Hypertension    • Pulmonary emboli (CMS/HCC)    • Stroke (CMS/HCC)    ,   Past Surgical History:   Procedure Laterality Date   • OVARIAN CYST REMOVAL     • SINUS SURGERY     ,   Family History   Problem Relation Age of Onset   • Heart attack Mother    • Heart disease Mother    • Heart disease Father    ,   Social History     Tobacco Use   • Smoking status: Current Every Day Smoker     Packs/day: 2.00     Types: Cigarettes   • Smokeless tobacco: Never Used   Substance Use Topics   • Alcohol use: No     Frequency: Never   • Drug use: Defer   ,   Medications Prior to Admission   Medication Sig Dispense Refill Last Dose   • aspirin 325 MG tablet Take 325 mg by mouth Daily.      • fexofenadine (ALLEGRA) 60 MG tablet Take 60 mg by mouth Daily.      • metoprolol tartrate (LOPRESSOR) 25 MG tablet Take 25 mg by mouth 2 (Two) Times a Day.  10 12/15/2019 at Unknown time   , Scheduled Meds:    aspirin 325 mg Oral Daily   [START ON 12/17/2019] influenza vaccine 0.5 mL Intramuscular Once   ipratropium-albuterol 3 mL Nebulization Q6H - RT   metoprolol tartrate 25 mg Oral BID   nicotine 1 patch Transdermal Q24H   sodium chloride 10 mL Intravenous Q12H   , Continuous Infusions:    lactated ringers 75 mL/hr Last Rate: 75 mL/hr (12/16/19 1641)    and Allergies:  Augmentin [amoxicillin-pot clavulanate]; Cephalosporins; Zyprexa [olanzapine]; Amoxicillin; and Zolpidem    Objective     Vital Signs   Temp:  [97.8 °F (36.6 °C)-99.4 °F (37.4 °C)] 97.8 °F (36.6 °C)  Heart Rate:  [] 87  Resp:  [16-20] 20  BP: (129-178)/() 129/81    Physical Exam:  Constitutional: Middle-aged white female, not in acute distress.  Eyes: Injected conjunctivae, EOMI. pupils equal reactive to light.  ENMT: [place] 3. No oral thrush. Tonsils grade 1  Neck: Trachea midline. No thyromegaly  Heart: RRR, no murmur  Lungs: Diminished breath  sounds overall.  Coarse breath sounds bilaterally but no discernible crackles.  No wheezing.  Nonlabored breathing  Abdomen: Overweight abdomen. Soft. No tenderness or dullness. No HSM.  Extremities: No cyanosis, ++ clubbing or pitting edema.  Warm extremities and well-perfused.  Neuro: Conscious, alert, oriented x3.  Strength 5/5 in arms.  Psych: Appropriate mood and affect.    Integumentary: No rash.  Normal skin turgor  Lymphatic: No palpable cervical or supraclavicular lymph nodes.      Diagnostic imaging:  I personally and independently reviewed the following images:   Left upper lobe pulmonary nodule, 7 mm.  Irregular.  Background of severe emphysema.  Groundglass infiltrates, multifocal and interstitial infiltrates at the bases.        Assessment   1. COPD exacerbation  2. Bilateral pneumonia   3. Mild lactic acidosis  4. Left upper lobe pulmonary nodule, 7 mm  5. Insomnia  6. Tobacco abuse  7. Insomnia, chronic      Recommendations:    · Nicotine patches for smoking.  Counseling for smoking cessation.  · Start DuoNeb 4 times a day.  She will need a long-acting inhaler on discharge and she will benefit from dual bronchodilators (LAMA/LABA)  · Walking oximetry prior to discharge.  Can probably be discharged home tomorrow.  · Start prednisone 40 mg daily  · Agree with levofloxacin for pneumonia.  · Repeat CT chest in 3-month.  High risk of malignancy.  She also has clubbing.  · Melatonin as needed for sleep.          Sonia Munguia MD  12/16/19  8:35 PM

## 2019-12-17 NOTE — PLAN OF CARE
Problem: Patient Care Overview  Goal: Plan of Care Review  Outcome: Ongoing (interventions implemented as appropriate)  Flowsheets (Taken 12/17/2019 1462)  Plan of Care Reviewed With: patient  Outcome Summary: Pt presents to OT with decreased strength and endurance for adl. Pt may benefit from OT for safety for adl and energy conservation teaching

## 2019-12-17 NOTE — PLAN OF CARE
Pt A&OX4 with VSS. Some c/o HA and anxiety. Highest CIWA has been an 8 with 1mg Ativan given once. Pt is a two pack a day smoker - requested and provided new order for Nicotine patch. Expected DC home today. Will CTM and provide care.

## 2019-12-18 VITALS
DIASTOLIC BLOOD PRESSURE: 91 MMHG | RESPIRATION RATE: 18 BRPM | OXYGEN SATURATION: 96 % | HEART RATE: 66 BPM | TEMPERATURE: 98.3 F | BODY MASS INDEX: 25.13 KG/M2 | HEIGHT: 69 IN | SYSTOLIC BLOOD PRESSURE: 143 MMHG | WEIGHT: 169.7 LBS

## 2019-12-18 PROBLEM — F41.9 ANXIETY DISORDER: Status: ACTIVE | Noted: 2019-12-18

## 2019-12-18 LAB
ALBUMIN SERPL-MCNC: 3.6 G/DL (ref 3.5–5.2)
ALBUMIN/GLOB SERPL: 0.9 G/DL
ALP SERPL-CCNC: 81 U/L (ref 39–117)
ALT SERPL W P-5'-P-CCNC: 8 U/L (ref 1–33)
ANION GAP SERPL CALCULATED.3IONS-SCNC: 15.2 MMOL/L (ref 5–15)
AST SERPL-CCNC: 12 U/L (ref 1–32)
BACTERIA SPEC RESP CULT: NORMAL
BASOPHILS # BLD AUTO: 0.01 10*3/MM3 (ref 0–0.2)
BASOPHILS NFR BLD AUTO: 0.2 % (ref 0–1.5)
BILIRUB SERPL-MCNC: 0.4 MG/DL (ref 0.2–1.2)
BUN BLD-MCNC: 7 MG/DL (ref 8–23)
BUN/CREAT SERPL: 13 (ref 7–25)
CALCIUM SPEC-SCNC: 8.8 MG/DL (ref 8.6–10.5)
CHLORIDE SERPL-SCNC: 95 MMOL/L (ref 98–107)
CO2 SERPL-SCNC: 20.8 MMOL/L (ref 22–29)
CREAT BLD-MCNC: 0.54 MG/DL (ref 0.57–1)
DEPRECATED RDW RBC AUTO: 48.4 FL (ref 37–54)
EOSINOPHIL # BLD AUTO: 0.01 10*3/MM3 (ref 0–0.4)
EOSINOPHIL NFR BLD AUTO: 0.2 % (ref 0.3–6.2)
ERYTHROCYTE [DISTWIDTH] IN BLOOD BY AUTOMATED COUNT: 15.6 % (ref 12.3–15.4)
GFR SERPL CREATININE-BSD FRML MDRD: 115 ML/MIN/1.73
GLOBULIN UR ELPH-MCNC: 3.8 GM/DL
GLUCOSE BLD-MCNC: 186 MG/DL (ref 65–99)
GRAM STN SPEC: NORMAL
HCT VFR BLD AUTO: 34 % (ref 34–46.6)
HGB BLD-MCNC: 11.5 G/DL (ref 12–15.9)
IMM GRANULOCYTES # BLD AUTO: 0.02 10*3/MM3 (ref 0–0.05)
IMM GRANULOCYTES NFR BLD AUTO: 0.4 % (ref 0–0.5)
LYMPHOCYTES # BLD AUTO: 1.04 10*3/MM3 (ref 0.7–3.1)
LYMPHOCYTES NFR BLD AUTO: 21.4 % (ref 19.6–45.3)
MCH RBC QN AUTO: 28.7 PG (ref 26.6–33)
MCHC RBC AUTO-ENTMCNC: 33.8 G/DL (ref 31.5–35.7)
MCV RBC AUTO: 84.8 FL (ref 79–97)
MONOCYTES # BLD AUTO: 0.18 10*3/MM3 (ref 0.1–0.9)
MONOCYTES NFR BLD AUTO: 3.7 % (ref 5–12)
NEUTROPHILS # BLD AUTO: 3.59 10*3/MM3 (ref 1.7–7)
NEUTROPHILS NFR BLD AUTO: 74.1 % (ref 42.7–76)
NRBC BLD AUTO-RTO: 0 /100 WBC (ref 0–0.2)
PLATELET # BLD AUTO: 235 10*3/MM3 (ref 140–450)
PMV BLD AUTO: 10.1 FL (ref 6–12)
POTASSIUM BLD-SCNC: 3.7 MMOL/L (ref 3.5–5.2)
PROT SERPL-MCNC: 7.4 G/DL (ref 6–8.5)
RBC # BLD AUTO: 4.01 10*6/MM3 (ref 3.77–5.28)
SODIUM BLD-SCNC: 131 MMOL/L (ref 136–145)
WBC NRBC COR # BLD: 4.85 10*3/MM3 (ref 3.4–10.8)

## 2019-12-18 PROCEDURE — 80053 COMPREHEN METABOLIC PANEL: CPT | Performed by: HOSPITALIST

## 2019-12-18 PROCEDURE — 94799 UNLISTED PULMONARY SVC/PX: CPT

## 2019-12-18 PROCEDURE — 63710000001 PREDNISONE PER 1 MG: Performed by: INTERNAL MEDICINE

## 2019-12-18 PROCEDURE — 85025 COMPLETE CBC W/AUTO DIFF WBC: CPT | Performed by: HOSPITALIST

## 2019-12-18 RX ORDER — AZITHROMYCIN 500 MG/1
500 TABLET, FILM COATED ORAL DAILY
Qty: 5 TABLET | Refills: 0 | Status: SHIPPED | OUTPATIENT
Start: 2019-12-18 | End: 2019-12-23

## 2019-12-18 RX ORDER — TEMAZEPAM 15 MG/1
15 CAPSULE ORAL NIGHTLY
Status: DISCONTINUED | OUTPATIENT
Start: 2019-12-18 | End: 2019-12-18 | Stop reason: HOSPADM

## 2019-12-18 RX ORDER — DIAZEPAM 5 MG/1
5 TABLET ORAL EVERY 12 HOURS SCHEDULED
Qty: 28 TABLET | Refills: 0 | Status: SHIPPED | OUTPATIENT
Start: 2019-12-18 | End: 2019-12-27 | Stop reason: SDUPTHER

## 2019-12-18 RX ORDER — PREDNISONE 20 MG/1
40 TABLET ORAL
Qty: 10 TABLET | Refills: 0 | Status: SHIPPED | OUTPATIENT
Start: 2019-12-19 | End: 2019-12-24

## 2019-12-18 RX ORDER — TEMAZEPAM 15 MG/1
15 CAPSULE ORAL NIGHTLY
Qty: 14 CAPSULE | Refills: 0 | Status: SHIPPED | OUTPATIENT
Start: 2019-12-18 | End: 2019-12-27

## 2019-12-18 RX ORDER — DIAZEPAM 5 MG/1
5 TABLET ORAL EVERY 6 HOURS SCHEDULED
Status: DISCONTINUED | OUTPATIENT
Start: 2019-12-18 | End: 2019-12-18

## 2019-12-18 RX ORDER — TEMAZEPAM 15 MG/1
30 CAPSULE ORAL NIGHTLY
Status: DISCONTINUED | OUTPATIENT
Start: 2019-12-18 | End: 2019-12-18

## 2019-12-18 RX ORDER — DIAZEPAM 5 MG/1
5 TABLET ORAL EVERY 12 HOURS SCHEDULED
Status: DISCONTINUED | OUTPATIENT
Start: 2019-12-18 | End: 2019-12-18 | Stop reason: HOSPADM

## 2019-12-18 RX ADMIN — Medication 5 MG: at 02:55

## 2019-12-18 RX ADMIN — ACETAMINOPHEN 650 MG: 325 TABLET, FILM COATED ORAL at 02:54

## 2019-12-18 RX ADMIN — PREDNISONE 40 MG: 20 TABLET ORAL at 09:12

## 2019-12-18 RX ADMIN — METOPROLOL TARTRATE 25 MG: 25 TABLET ORAL at 09:12

## 2019-12-18 RX ADMIN — LORAZEPAM 1 MG: 1 TABLET ORAL at 09:29

## 2019-12-18 RX ADMIN — ASPIRIN 325 MG: 325 TABLET ORAL at 09:12

## 2019-12-18 RX ADMIN — NICOTINE 1 PATCH: 7 PATCH, EXTENDED RELEASE TRANSDERMAL at 09:12

## 2019-12-18 RX ADMIN — LORAZEPAM 1 MG: 1 TABLET ORAL at 02:54

## 2019-12-18 RX ADMIN — IPRATROPIUM BROMIDE AND ALBUTEROL SULFATE 3 ML: 2.5; .5 SOLUTION RESPIRATORY (INHALATION) at 08:32

## 2019-12-18 RX ADMIN — SODIUM CHLORIDE, PRESERVATIVE FREE 10 ML: 5 INJECTION INTRAVENOUS at 10:00

## 2019-12-18 NOTE — DISCHARGE SUMMARY
Patient Name: Ada Ngo  : 1960  MRN: [id number]    Date of Admission: 2019  Date of Discharge:  2019  Primary Care Physician: Trenton Cerrato Jr., DO      Chief Complaint:   Flu Symptoms and Chest Pain      Discharge Diagnoses     Active Hospital Problems    Diagnosis  POA   • **Pneumonia of left lower lobe due to infectious organism (CMS/HCC) [J18.1]  Yes   • Anxiety disorder [F41.9]  Yes   • Alcoholism /alcohol abuse (CMS/HCC) [F10.20]  Yes   • Tobacco dependence [F17.200]  Yes   • Essential hypertension [I10]  Yes   • Other insomnia [G47.09]  Yes   • Sarcoidosis [D86.9]  Yes      Resolved Hospital Problems   No resolved problems to display.        Hospital Course     Ms. Ngo is a 59 y.o. female smoker with a history of sarcoidosis, CAD, alcoholism, HTN that presents to [facility] complaining of worsening cough, dyspnea, fever. Please see the admitting history and physical for further details.  She was found to have elevated D-Dimer. CTA chest was negative for PE but showed multifocal infiltrates mostly in lt lung base. Lactic Acid elevated 2.4, WBC 13, Procal 13.47. Blood cultures, sputum cult, resp viral panel, legionella & strep pneumo were collected. She was started on IV Levaquin and nebulizer treatments  and was admitted to the hospital for further evaluation and treatment.  All cultures and resp panel were negative. Pulmonology followed who recommends po azithromycin for atypical pneumonia x 5 more days as well as prednisone for COPD, GENEVA, and LAMA/LABA. She will need repeat CT chest in 3 months to f/u LETICIA nodule. She was started on Nicotine patch for smoking cessation and was counseled on continued cessation. Overall, her pulmonary symptoms have improved.     [facility] followed pt for reported heavy alcohol use. Pt reported going on drinking binges in order to sleep. She was under the care of a psychiatrist up until ~ 1 yr ago. At that time she was  prescribed Diazepam (10 mg q 6 hours) and Temazepam (30 mg HS) which were beneficial for anxiety disorder and insomnia. She reports seizure activity, headaches, hallucinations when sleep is deprived. Symptoms and sleep improve with etoh consumption. She was placed on CIWA protocol this hospitalization (lorazepam). Multiple discussions were had with the patient re: concerns for etoh withdrawal in regards to her symptoms. She is at risk for detrimental effects, including death, if she continues to binge drink. It would be safer to prescribe her a low dose benzo and sleeping medication at discharge. She agrees to avoid etoh and take benzo's as prescribed at discharge. She agrees to stay on this regimen until she follows up with psychiatry, scheduled for 12/27/19. She will be prescribed diazepam 5 mg BID and temazepam 15 mg hs.       Day of Discharge     Breathing well; occasional wheezing. +productive cough. Afebrile. Agrees with discharge home today    Physical Exam:  Temp:  [98 °F (36.7 °C)-98.6 °F (37 °C)] 98.3 °F (36.8 °C)  Heart Rate:  [66-90] 66  Resp:  [16-18] 18  BP: (138-158)/(76-91) 143/91  Body mass index is 25.06 kg/m².  Physical Exam   Constitutional: She is oriented to person, place, and time. She appears well-developed. No distress.   HENT:   Head: Normocephalic and atraumatic.   Eyes: EOM are normal.   Neck: No JVD present.   Cardiovascular: Normal rate and regular rhythm.   Pulmonary/Chest: Effort normal.   Mildly coarse  Productive cough   Abdominal: Soft. Bowel sounds are normal.   Musculoskeletal: Normal range of motion. She exhibits no edema.   Neurological: She is alert and oriented to person, place, and time.   Skin: Skin is warm and dry.   Psychiatric: She has a normal mood and affect.       Consultants     Consult Orders (all) (From admission, onward)     Start     Ordered    12/16/19 1015  Inpatient Pulmonology Consult  Once     Specialty:  Pulmonary Disease  Provider:  Wes Olvera MD     12/16/19 1015    12/16/19 0839  Inpatient Access Center Consult  Once     Provider:  (Not yet assigned)    12/16/19 0839              Procedures     * Surgery not found *      Imaging Results (All)     Procedure Component Value Units Date/Time    CT Angiogram Chest [[id number]] Collected:  12/16/19 0502     Updated:  12/16/19 0515    Narrative:       CT ANGIOGRAM OF THE CHEST     HISTORY: Shortness of air     COMPARISON: None available.     TECHNIQUE: Axial CT imaging was obtained from the thoracic inlet through  the dome diaphragm. IV contrast was administered. 3-D reformatted images  were obtained.     FINDINGS:  No acute pulmonary thromboembolus is seen. The main pulmonary artery is  enlarged, which can be seen in setting of pulmonary arterial  hypertension. The thoracic aorta measures within normal size limits.  There is no evidence of dissection. The thyroid gland and trachea appear  within normal limits. There is a small hiatal hernia. There is a trace  left pleural effusion. No definite effusion is seen on the right.  Background emphysematous changes are present. Patchy groundglass  infiltrates are seen within the right middle lobe and lingula. More  extensive consolidation is noted at the lung bases bilaterally, left  greater than right. There also appears to be some interlobular septal  thickening, which may reflect vascular congestion. There is irregularity  marginated nodule within the left upper lobe. This measures up to 8 mm  in size. There is an enlarged right hilar node. This measures up to 1.9  x 1.3 cm. Shotty mediastinal lymph nodes are noted. Images through the  upper abdomen do not demonstrate any acute abnormalities. There is  extensive ventral liminal mural thrombus identified within the abdominal  aorta. No acute osseous abnormalities are seen. Compression deformity is  noted at T6. This is age indeterminate. Correlation with history and  point tenderness is suggested. Bilateral breast implants  are noted.       Impression:          1. No acute pulmonary thromboembolus seen.  2. Multifocal infiltrates are identified within both lungs. These are  most significant at the left lung base, and are concerning for  multifocal pneumonia. There is also a trace left pleural effusion.  3. The patient does have some interlobular septal thickening, the  possibility of some superimposed vascular congestion isn't excluded.  4. Somewhat irregularly marginated nodule identified within the left  upper lobe is indeterminate. Given background emphysematous changes,  repeat chest CT in 3-6 months is suggested. Furthermore, the patient  appears to have an enlarged right hilar node. Attention to this node and  the left upper lobe nodule, as well as the patient's bilateral pulmonary  infiltrates, is suggested.      Radiation dose reduction techniques were utilized, including automated  exposure control and exposure modulation based on body size.     This report was finalized on 12/16/2019 5:12 AM by Dr. Alyce Rodriguez M.D.       XR Chest 2 View [[id number]] Collected:  12/16/19 0228     Updated:  12/16/19 0233    Narrative:       PA AND LATERAL CHEST RADIOGRAPH     HISTORY: Shortness of air and productive cough. HISTORY of sarcoidosis     COMPARISON: None available.     FINDINGS:  Heart size is within normal limits. Patient does appear to have fibrotic  changes, although these are in a basilar predominant distribution. There  is a left basilar infiltrate, which may reflect pneumonia. No  pneumothorax or pleural effusion is seen.       Impression:       Possible left basilar pneumonia. Short-term follow-up exam to document  resolution is recommended.     This report was finalized on 12/16/2019 2:30 AM by Dr. Alyce Rodriguez M.D.                 Pertinent Labs     Results from last 7 days   Lab Units 12/18/19  0417 12/17/19  0540 12/16/19  0715 12/16/19  0216   WBC 10*3/mm3 4.85 5.03 10.79 13.47*   HEMOGLOBIN g/dL 11.5* 10.6*  12.8 13.3   PLATELETS 10*3/mm3 235 207 301 336     Results from last 7 days   Lab Units 12/18/19  0417 12/17/19  0540 12/16/19  0715 12/16/19  0320   SODIUM mmol/L 131* 136 137 139   POTASSIUM mmol/L 3.7 3.9 3.8 3.8   CHLORIDE mmol/L 95* 101 98 98   CO2 mmol/L 20.8* 20.5* 22.4 23.5   BUN mg/dL 7* 6* 9 11   CREATININE mg/dL 0.54* 0.58 0.50* 0.62   GLUCOSE mg/dL 186* 101* 130* 140*   Estimated Creatinine Clearance: 133 mL/min (A) (by C-G formula based on SCr of 0.54 mg/dL (L)).  Results from last 7 days   Lab Units 12/18/19  0417 12/16/19  0320   ALBUMIN g/dL 3.60 4.60   BILIRUBIN mg/dL 0.4 0.6   ALK PHOS U/L 81 107   AST (SGOT) U/L 12 15   ALT (SGPT) U/L 8 9     Results from last 7 days   Lab Units 12/18/19  0417 12/17/19  0540 12/16/19  0715 12/16/19  0320   CALCIUM mg/dL 8.8 8.4* 8.9 9.7   ALBUMIN g/dL 3.60  --   --  4.60       Results from last 7 days   Lab Units 12/16/19  0320 12/16/19  0216   TROPONIN T ng/mL <0.010  --    PROBNP pg/mL  --  1,734.0*   D DIMER QUANT MCGFEU/mL  --  1.68*           Invalid input(s): LDLCALC  Results from last 7 days   Lab Units 12/16/19  1209 12/16/19  0320   BLOODCX   --  No growth at 2 days  No growth at 2 days   RESPCX  Scant growth (1+) Normal Respiratory Loli  --        Test Results Pending at Discharge      Order Current Status    Blood Culture - Blood, Arm, Left Preliminary result    Blood Culture - Blood, Arm, Right Preliminary result          Discharge Details        Discharge Medications      New Medications      Instructions Start Date   azithromycin 500 MG tablet  Commonly known as:  ZITHROMAX  Notes to patient:  12/19/19 AM   500 mg, Oral, Daily      diazePAM 5 MG tablet  Commonly known as:  VALIUM  Notes to patient:  12/18/19 PM   5 mg, Oral, Every 12 Hours Scheduled      nicotine 7 MG/24HR patch  Commonly known as:  NICODERM CQ  Notes to patient:  12/19/19 AM   1 patch, Transdermal, Every 24 Hours Scheduled   Start Date:  December 19, 2019     predniSONE 20 MG  tablet  Commonly known as:  DELTASONE  Notes to patient:  12/19/19 AM   40 mg, Oral, Daily With Breakfast   Start Date:  December 19, 2019     temazepam 15 MG capsule  Commonly known as:  RESTORIL   15 mg, Oral, Nightly      tiotropium bromide-olodaterol 2.5-2.5 MCG/ACT aerosol solution inhaler  Commonly known as:  STIOLTO RESPIMAT   2 puffs, Inhalation, Daily         Continue These Medications      Instructions Start Date   aspirin 325 MG tablet  Notes to patient:  12/19/19 AM   325 mg, Oral, Daily      fexofenadine 60 MG tablet  Commonly known as:  ALLEGRA  Notes to patient:  12/19/19 AM   60 mg, Oral, Daily      metoprolol tartrate 25 MG tablet  Commonly known as:  LOPRESSOR  Notes to patient:  12/18/19 PM   25 mg, Oral, 2 Times Daily             Allergies   Allergen Reactions   • Augmentin [Amoxicillin-Pot Clavulanate] Anaphylaxis   • Cephalosporins Anaphylaxis   • Zyprexa [Olanzapine] Anaphylaxis   • Amoxicillin Unknown (See Comments)   • Zolpidem Unknown (See Comments)         Discharge Disposition:  Home or Self Care    Discharge Diet:  Diet Order   Procedures   • Diet Soft Texture; Chopped; Thin; Cardiac       Discharge Activity:   Activity Instructions     Activity as Tolerated            CODE STATUS:    Code Status and Medical Interventions:   Ordered at: 12/16/19 0519     Code Status:    CPR     Medical Interventions (Level of Support Prior to Arrest):    Full       Future Appointments   Date Time Provider Department Center   12/27/2019 10:00 AM Katey Soto MD [facility] None     Follow-up Information     Sonia Munguia MD. Schedule an appointment as soon as possible for a visit in 1 month(s).    Specialties:  Pulmonary Disease, Sleep Medicine  Contact information:  [address] [facility] [address] [id number] [phone]             Trenton Cerrato Jr., DO .    Specialties:  Family Medicine, Emergency Medicine, Urgent Care  Contact information:  [address]  [place]  [address]  [phone]                   Time Spent on Discharge:  Greater than 30 minutes      HUANG So  [facility]  12/18/19  2:28 PM

## 2019-12-18 NOTE — PROGRESS NOTES
"                                              LOS: 1 day   Patient Care Team:  Trenton Cerrato Jr., DO as PCP - General (Family Medicine)    Chief Complaint:  F/up pulmonary infiltrates, COPD, suspect pneumonia and medical problems listed below    Subjective   Interval History  Feeling better today.  She was sleeping when I entered the room and I had to awaken her.  She denies shortness of breath at rest.  Cough is mildly productive    REVIEW OF SYSTEMS:   CARDIOVASCULAR: No chest pain, chest pressure or chest discomfort. No palpitations or edema.   Constitutional: No fever or chills      Ventilator/Non-Invasive Ventilation Settings (From admission, onward)    None                Physical Exam:     Vital Signs  Temp:  [97.4 °F (36.3 °C)-98.6 °F (37 °C)] 98.6 °F (37 °C)  Heart Rate:  [70-90] 75  Resp:  [16-20] 16  BP: (132-158)/(74-90) 158/87    Intake/Output Summary (Last 24 hours) at 12/17/2019 2341  Last data filed at 12/17/2019 1300  Gross per 24 hour   Intake 1240 ml   Output 1000 ml   Net 240 ml     Flowsheet Rows      First Filed Value   Admission Height  175.3 cm (69\") Documented at 12/16/2019 0137   Admission Weight  77.5 kg (170 lb 12.8 oz) Documented at 12/16/2019 0214          General Appearance:    Alert, cooperative, in no acute distress   ENMT:  Mallampati score 3, moist mucous membrane   Eyes: Pupils equals and reactive to light. EOMI   Neck:   Large. Trachea midline. No thyromegaly.   Lungs:     Clear to auscultation,respirations regular, even and                  unlabored    Heart:   Diminished breath sounds overall.  Minimal crackles at bases.  No wheezing.  Nonlabored breathing   Skin:    No abnormalities observed   Abdomen:     Obese. Soft. No tenderness. No HSM.   Neuro:   Conscious, alert, oriented x3. Strength 5/5 in upper and lower ext   Extremities:   Moves all extremities well, no edema, no cyanosis, no             Redness          Results Review:        Results from last 7 days   Lab " Units 12/17/19  0540 12/16/19  0715 12/16/19  0320   SODIUM mmol/L 136 137 139   POTASSIUM mmol/L 3.9 3.8 3.8   CHLORIDE mmol/L 101 98 98   CO2 mmol/L 20.5* 22.4 23.5   BUN mg/dL 6* 9 11   CREATININE mg/dL 0.58 0.50* 0.62   GLUCOSE mg/dL 101* 130* 140*   CALCIUM mg/dL 8.4* 8.9 9.7     Results from last 7 days   Lab Units 12/16/19  0320   TROPONIN T ng/mL <0.010     Results from last 7 days   Lab Units 12/17/19  0540 12/16/19  0715 12/16/19  0216   WBC 10*3/mm3 5.03 10.79 13.47*   HEMOGLOBIN g/dL 10.6* 12.8 13.3   HEMATOCRIT % 31.5* 38.2 39.9   PLATELETS 10*3/mm3 207 301 336     Results from last 7 days   Lab Units 12/16/19  0216   INR  0.94     Results from last 7 days   Lab Units 12/16/19  0216   PROBNP pg/mL 1,734.0*       I reviewed the patient's new clinical results.  I personally viewed and interpreted the patient's CXR        Medication Review:     aspirin 325 mg Oral Daily   ipratropium-albuterol 3 mL Nebulization Q6H - RT   metoprolol tartrate 25 mg Oral BID   nicotine 1 patch Transdermal Q24H   predniSONE 40 mg Oral Daily With Breakfast   sodium chloride 10 mL Intravenous Q12H              Assessment   1. COPD exacerbation  2. Bilateral pneumonia   3. Mild lactic acidosis  4. Left upper lobe pulmonary nodule, 7 mm  5. Insomnia  6. Tobacco abuse  7. Insomnia, chronic    Plan     · Duoenb Q6 h  · Prednisone 40 mg day 1 then taper  · Follow up CT chest in 3 months  · Start Azithromycin for COPD exacerbation/ atypical pneumonia. Noted that Levofloxacin was stopped.   · Agree with primary team regarding avoidance of sedative/hyponotics for sleep. I did see the patient sleeping during the day today and discussed with her that napping during the day would result in insomnia at night. Sleep hygiene discussed    Ok to discharge with oral Azithromycin, albuterol inhaler PRN and maintenance bronchodilators such as JOSE CARLOS Munguia MD  12/17/19  11:41 PM          This note was dictated utilizing Kellie  dictation

## 2019-12-18 NOTE — PLAN OF CARE
Pt requested Ativan several times throughout night, pt did not score high enough to receive medication several times only required it twice, pt verbally stated she was extremely anxious however appeared very calm and talkative and remained very relaxed, pt discussed with this nurse the need to find a dr to prescribe benzos when dc'd, pt expressed the need to be dc'd early in next shift so she can seek care elsewhere, she wishes to go to a facility that will administer Ativan on a scheduled basis, pt stated she had a extreme headache, this nurse offered Tylenol and pt requested on call dr called to ask about getting a stronger sleeping  pill other than Melatonin and pain medication stronger than Tylenol, on call MD did not give any additional orders, pt then took Melatonin and Tylenol, no other issues throughout shift, will continue to monitor.

## 2019-12-19 NOTE — PROGRESS NOTES
Case Management Discharge Note      Final Note: Pt was dc'd home         Destination      No service has been selected for the patient.      Durable Medical Equipment      No service has been selected for the patient.      Dialysis/Infusion      No service has been selected for the patient.      Home Medical Care      No service has been selected for the patient.      Therapy      No service has been selected for the patient.      Community Resources      No service has been selected for the patient.        Transportation Services  Private: Car([facility] ride)    Final Discharge Disposition Code: 01 - home or self-care

## 2019-12-21 LAB
BACTERIA SPEC AEROBE CULT: NORMAL
BACTERIA SPEC AEROBE CULT: NORMAL

## 2019-12-27 ENCOUNTER — OFFICE VISIT (OUTPATIENT)
Dept: PSYCHIATRY | Facility: CLINIC | Age: 59
End: 2019-12-27

## 2019-12-27 DIAGNOSIS — F41.1 GENERALIZED ANXIETY DISORDER: Primary | ICD-10-CM

## 2019-12-27 DIAGNOSIS — F99 INSOMNIA DUE TO OTHER MENTAL DISORDER: ICD-10-CM

## 2019-12-27 DIAGNOSIS — F51.05 INSOMNIA DUE TO OTHER MENTAL DISORDER: ICD-10-CM

## 2019-12-27 DIAGNOSIS — F13.20 BENZODIAZEPINE DEPENDENCE (HCC): ICD-10-CM

## 2019-12-27 PROBLEM — G47.09 OTHER INSOMNIA: Status: RESOLVED | Noted: 2019-08-05 | Resolved: 2019-12-27

## 2019-12-27 PROCEDURE — 90792 PSYCH DIAG EVAL W/MED SRVCS: CPT | Performed by: PSYCHIATRY & NEUROLOGY

## 2019-12-27 RX ORDER — DIAZEPAM 10 MG/1
10 TABLET ORAL 2 TIMES DAILY
Qty: 60 TABLET | Refills: 0 | Status: SHIPPED | OUTPATIENT
Start: 2019-12-27 | End: 2020-01-24

## 2019-12-27 RX ORDER — TEMAZEPAM 30 MG/1
30 CAPSULE ORAL NIGHTLY PRN
Qty: 30 CAPSULE | Refills: 0 | Status: SHIPPED | OUTPATIENT
Start: 2019-12-27 | End: 2020-01-24

## 2019-12-27 NOTE — PROGRESS NOTES
Subjective   Ada Ngo is a 59 y.o. female who presents today for initial evaluation     Chief Complaint:  Depression anxiety     History of Present Illness:   20 years ago the pt was dsd with sarcoidosis , since that she had issues with sleep , temazepam 30 mg and diazepam 10 mg QID   PCP was prescribed those meds  Up until last year when PCP stopped prescribing those meds .  In order to sleep - she started drinking at night   She tried different meds - side effects or not effective     The following portions of the patient's history were reviewed and updated as appropriate: allergies, current medications, past family history, past medical history, past social history, past surgical history and problem list.    PAST PSYCHIATRIC HISTORY  Axis I  Anxiety/Panic Disorder, insomnia     Axis II  None    PAST OUTPATIENT TREATMENT  Diagnosis treated:  Addictive Disorder, Anxiety/Panic Disorder  Psych in [place] - tried a lot of meds, decided to go back to benzo and temazepam that used to work in the past   [facility] [place]     Treatment Type:  Medication Management  Prior Psychiatric Medications:  Prozac, sertraline - side effects,  paxil - not effective  Vistaril - not effective  neuronitn - caused heavy abN vaginal bleeding   Risperidone - not effective and side effects     Support Groups:  None   Sequelae Of Mental Disorder:  medical illness, emotional distress          Interval History  Deteriorated    Side Effects  Denied       Past Medical History:  Past Medical History:   Diagnosis Date   • Anxiety    • Coronary artery disease    • Depression    • Hypertension    • Other insomnia 8/5/2019    Refer to sleep specialist   • Pulmonary emboli (CMS/[facility])    • Stroke (CMS/[facility])        Social History:  Social History     Socioeconomic History   • Marital status:      Spouse name: Not on file   • Number of children: Not on file   • Years of education: Not on file   • Highest education level: Not on file   Tobacco  Use   • Smoking status: Current Every Day Smoker     Packs/day: 2.00     Types: Cigarettes   • Smokeless tobacco: Never Used   Substance and Sexual Activity   • Alcohol use: No     Frequency: Never   • Drug use: Defer   • Sexual activity: Defer     No children   Unemployed   She was a doctor, 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     Family History:  Family History   Problem Relation Age of Onset   • Heart attack Mother    • Heart disease Mother    • Heart disease Father        Past Surgical History:  Past Surgical History:   Procedure Laterality Date   • OVARIAN CYST REMOVAL     • SINUS SURGERY         Problem List:  Patient Active Problem List   Diagnosis   • Benzodiazepine dependence (CMS/HCC)   • Psychosis (CMS/HCC)   • Sarcoidosis   • Drug-seeking behavior   • Chronic midline low back pain without sciatica   • Insomnia due to other mental disorder   • Essential hypertension   • Pneumonia of left lower lobe due to infectious organism (CMS/HCC)   • Alcoholism /alcohol abuse (CMS/HCC)   • Tobacco dependence   • Generalized anxiety disorder       Allergy:   Allergies   Allergen Reactions   • Augmentin [Amoxicillin-Pot Clavulanate] Anaphylaxis   • Cephalosporins Anaphylaxis   • Zyprexa [Olanzapine] Anaphylaxis   • Amoxicillin Unknown (See Comments)   • Zolpidem Unknown (See Comments)        Discontinued Medications:  Medications Discontinued During This Encounter   Medication Reason   • diazePAM (VALIUM) 5 MG tablet Reorder   • temazepam (RESTORIL) 15 MG capsule        Current Medications:   Current Outpatient Medications   Medication Sig Dispense Refill   • aspirin 325 MG tablet Take 325 mg by mouth Daily.     • diazePAM (VALIUM) 10 MG tablet Take 1 tablet by mouth 2 (Two) Times a Day for 30 days. 60 tablet 0   • fexofenadine (ALLEGRA) 60 MG tablet Take 60 mg by mouth Daily.     • metoprolol tartrate (LOPRESSOR) 25 MG tablet Take 25 mg by mouth 2 (Two) Times a Day.  10   • nicotine (NICODERM CQ) 7 MG/24HR  patch Place 1 patch on the skin as directed by provider daily. 14 patch 0   • temazepam (RESTORIL) 30 MG capsule Take 1 capsule by mouth At Night As Needed for Sleep. 30 capsule 0   • tiotropium bromide-olodaterol (STIOLTO RESPIMAT) 2.5-2.5 MCG/ACT aerosol solution inhaler Inhale 2 puffs by mouth daily. 4 g 0     No current facility-administered medications for this visit.          Review of Symptoms:    Psychiatric/Behavioral: Negative for agitation, behavioral problems, confusion, decreased concentration, dysphoric mood, hallucinations, self-injury, sleep disturbance and suicidal ideas.   The patient is  nervous/anxious and is not hyperactive.        Physical Exam:   There were no vitals taken for this visit.    Mental Status Exam:   Hygiene:   good  Cooperation:  Cooperative  Eye Contact:  Good  Psychomotor Behavior:  Appropriate  Affect:  Appropriate  Mood: fluctates  Hopelessness: Denies  Speech:  Normal  Thought Process:  Goal directed and Linear  Thought Content:  Normal  Suicidal:  None  Homicidal:  None  Hallucinations:  None  Delusion:  None  Memory:  fair   Orientation:  Person, Place, Time and Situation  Reliability:  fair  Insight:  Fair  Judgement:  Impaired  Impulse Control:  Fair  Physical/Medical Issues:  Yes  sarcoidosis        PHQ-9 Depression Screening  Little interest or pleasure in doing things? 2   Feeling down, depressed, or hopeless? 1   Trouble falling or staying asleep, or sleeping too much? 3   Feeling tired or having little energy? 2   Poor appetite or overeating? 0   Feeling bad about yourself - or that you are a failure or have let yourself or your family down? 2   Trouble concentrating on things, such as reading the newspaper or watching television? 1   Moving or speaking so slowly that other people could have noticed? Or the opposite - being so fidgety or restless that you have been moving around a lot more than usual? 0   Thoughts that you would be better off dead, or of hurting  yourself in some way? 0   PHQ-9 Total Score 11   If you checked off any problems, how difficult have these problems made it for you to do your work, take care of things at home, or get along with other people? Extremely dIfficult           Current every day smoker 3-10 mintues spent counseling Has reduced Tobb[place]os use    I advised Ada of the risks of tobacco use.     Lab Results:   Admission on 12/16/2019, Discharged on 12/18/2019   Component Date Value Ref Range Status   • Glucose 12/16/2019 140* 65 - 99 mg/dL Final   • BUN 12/16/2019 11  8 - 23 mg/dL Final   • Creatinine 12/16/2019 0.62  0.57 - 1.00 mg/dL Final   • Sodium 12/16/2019 139  136 - 145 mmol/L Final   • Potassium 12/16/2019 3.8  3.5 - 5.2 mmol/L Final   • Chloride 12/16/2019 98  98 - 107 mmol/L Final   • CO2 12/16/2019 23.5  22.0 - 29.0 mmol/L Final   • Calcium 12/16/2019 9.7  8.6 - 10.5 mg/dL Final   • Total Protein 12/16/2019 8.3  6.0 - 8.5 g/dL Final   • Albumin 12/16/2019 4.60  3.50 - 5.20 g/dL Final   • ALT (SGPT) 12/16/2019 9  1 - 33 U/L Final   • AST (SGOT) 12/16/2019 15  1 - 32 U/L Final   • Alkaline Phosphatase 12/16/2019 107  39 - 117 U/L Final   • Total Bilirubin 12/16/2019 0.6  0.2 - 1.2 mg/dL Final   • eGFR Non African Amer 12/16/2019 98  >60 mL/min/1.73 Final   • Globulin 12/16/2019 3.7  gm/dL Final   • A/G Ratio 12/16/2019 1.2  g/dL Final   • BUN/Creatinine Ratio 12/16/2019 17.7  7.0 - 25.0 Final   • Anion Gap 12/16/2019 17.5* 5.0 - 15.0 mmol/L Final   • Protime 12/16/2019 12.3  11.7 - 14.2 Seconds Final   • INR 12/16/2019 0.94  0.90 - 1.10 Final   • Troponin T 12/16/2019 <0.010  0.000 - 0.030 ng/mL Final   • D-Dimer, Quantitative 12/16/2019 1.68* 0.00 - 0.49 MCGFEU/mL Final   • proBNP 12/16/2019 1,734.0* 5.0 - 900.0 pg/mL Final   • Blood Culture 12/16/2019 No growth at 5 days   Final   • Blood Culture 12/16/2019 No growth at 5 days   Final   • Lactate 12/16/2019 2.4* 0.5 - 2.0 mmol/L Final   • WBC 12/16/2019 13.47* 3.40 - 10.80  10*3/mm3 Final   • RBC 12/16/2019 4.66  3.77 - 5.28 10*6/mm3 Final   • Hemoglobin 12/16/2019 13.3  12.0 - 15.9 g/dL Final   • Hematocrit 12/16/2019 39.9  34.0 - 46.6 % Final   • MCV 12/16/2019 85.6  79.0 - 97.0 fL Final   • MCH 12/16/2019 28.5  26.6 - 33.0 pg Final   • MCHC 12/16/2019 33.3  31.5 - 35.7 g/dL Final   • RDW 12/16/2019 16.6* 12.3 - 15.4 % Final   • RDW-SD 12/16/2019 51.2  37.0 - 54.0 fl Final   • MPV 12/16/2019 9.7  6.0 - 12.0 fL Final   • Platelets 12/16/2019 336  140 - 450 10*3/mm3 Final   • Neutrophil % 12/16/2019 80.1* 42.7 - 76.0 % Final   • Lymphocyte % 12/16/2019 13.2* 19.6 - 45.3 % Final   • Monocyte % 12/16/2019 3.9* 5.0 - 12.0 % Final   • Eosinophil % 12/16/2019 1.5  0.3 - 6.2 % Final   • Basophil % 12/16/2019 0.7  0.0 - 1.5 % Final   • Immature Grans % 12/16/2019 0.6* 0.0 - 0.5 % Final   • Neutrophils, Absolute 12/16/2019 10.79* 1.70 - 7.00 10*3/mm3 Final   • Lymphocytes, Absolute 12/16/2019 1.78  0.70 - 3.10 10*3/mm3 Final   • Monocytes, Absolute 12/16/2019 0.53  0.10 - 0.90 10*3/mm3 Final   • Eosinophils, Absolute 12/16/2019 0.20  0.00 - 0.40 10*3/mm3 Final   • Basophils, Absolute 12/16/2019 0.09  0.00 - 0.20 10*3/mm3 Final   • Immature Grans, Absolute 12/16/2019 0.08* 0.00 - 0.05 10*3/mm3 Final   • nRBC 12/16/2019 0.0  0.0 - 0.2 /100 WBC Final   • Procalcitonin 12/16/2019 0.06* 0.10 - 0.25 ng/mL Final   • Extra Tube 12/16/2019 Hold for add-ons.   Final    Auto resulted.   • Respiratory Culture 12/16/2019 Scant growth (1+) Normal Respiratory Loli   Final   • Gram Stain 12/16/2019 Few (2+) Budding yeast   Final   • Gram Stain 12/16/2019 Rare (1+) Mixed bacterial morphotypes seen on Gram Stain   Final   • Gram Stain 12/16/2019 Few (2+) Epithelial cells per low power field   Final   • Gram Stain 12/16/2019 Rare (1+) WBCs per low power field   Final   • LEGIONELLA ANTIGEN, URINE 12/16/2019 Negative  Negative Final   • ADENOVIRUS, PCR 12/16/2019 Not Detected  Not Detected Final   • Coronavirus  229E 12/16/2019 Not Detected  Not Detected Final   • Coronavirus HKU1 12/16/2019 Not Detected  Not Detected Final   • Coronavirus NL63 12/16/2019 Not Detected  Not Detected Final   • Coronavirus OC43 12/16/2019 Not Detected  Not Detected Final   • Human Metapneumovirus 12/16/2019 Not Detected  Not Detected Final   • Human Rhinovirus/Enterovirus 12/16/2019 Not Detected  Not Detected Final   • Influenza B PCR 12/16/2019 Not Detected  Not Detected Final   • Parainfluenza Virus 1 12/16/2019 Not Detected  Not Detected Final   • Parainfluenza Virus 2 12/16/2019 Not Detected  Not Detected Final   • Parainfluenza Virus 3 12/16/2019 Not Detected  Not Detected Final   • Parainfluenza Virus 4 12/16/2019 Not Detected  Not Detected Final   • Bordetella pertussis pcr 12/16/2019 Not Detected  Not Detected Final   • Influenza A H1 2009 PCR 12/16/2019 Not Detected  Not Detected Final   • Chlamydophila pneumoniae PCR 12/16/2019 Not Detected  Not Detected Final   • Mycoplasma pneumo by PCR 12/16/2019 Not Detected  Not Detected Final   • Influenza A PCR 12/16/2019 Not Detected  Not Detected Final   • Influenza A H3 12/16/2019 Not Detected  Not Detected Final   • Influenza A H1 12/16/2019 Not Detected  Not Detected Final   • RSV, PCR 12/16/2019 Not Detected  Not Detected Final   • Bordetella parapertussis PCR 12/16/2019 Not Detected  Not Detected Final   • Strep Pneumo Ag 12/16/2019 Negative  Negative Final   • Glucose 12/16/2019 130* 65 - 99 mg/dL Final   • BUN 12/16/2019 9  8 - 23 mg/dL Final   • Creatinine 12/16/2019 0.50* 0.57 - 1.00 mg/dL Final   • Sodium 12/16/2019 137  136 - 145 mmol/L Final   • Potassium 12/16/2019 3.8  3.5 - 5.2 mmol/L Final   • Chloride 12/16/2019 98  98 - 107 mmol/L Final   • CO2 12/16/2019 22.4  22.0 - 29.0 mmol/L Final   • Calcium 12/16/2019 8.9  8.6 - 10.5 mg/dL Final   • eGFR Non African Amer 12/16/2019 125  >60 mL/min/1.73 Final   • BUN/Creatinine Ratio 12/16/2019 18.0  7.0 - 25.0 Final   • Anion Gap  12/16/2019 16.6* 5.0 - 15.0 mmol/L Final   • WBC 12/16/2019 10.79  3.40 - 10.80 10*3/mm3 Final   • RBC 12/16/2019 4.55  3.77 - 5.28 10*6/mm3 Final   • Hemoglobin 12/16/2019 12.8  12.0 - 15.9 g/dL Final   • Hematocrit 12/16/2019 38.2  34.0 - 46.6 % Final   • MCV 12/16/2019 84.0  79.0 - 97.0 fL Final   • MCH 12/16/2019 28.1  26.6 - 33.0 pg Final   • MCHC 12/16/2019 33.5  31.5 - 35.7 g/dL Final   • RDW 12/16/2019 16.5* 12.3 - 15.4 % Final   • RDW-SD 12/16/2019 50.6  37.0 - 54.0 fl Final   • MPV 12/16/2019 9.5  6.0 - 12.0 fL Final   • Platelets 12/16/2019 301  140 - 450 10*3/mm3 Final   • Lactate 12/16/2019 2.0  0.5 - 2.0 mmol/L Final   • WBC 12/17/2019 5.03  3.40 - 10.80 10*3/mm3 Final   • RBC 12/17/2019 3.72* 3.77 - 5.28 10*6/mm3 Final   • Hemoglobin 12/17/2019 10.6* 12.0 - 15.9 g/dL Final   • Hematocrit 12/17/2019 31.5* 34.0 - 46.6 % Final   • MCV 12/17/2019 84.7  79.0 - 97.0 fL Final   • MCH 12/17/2019 28.5  26.6 - 33.0 pg Final   • MCHC 12/17/2019 33.7  31.5 - 35.7 g/dL Final   • RDW 12/17/2019 15.7* 12.3 - 15.4 % Final   • RDW-SD 12/17/2019 48.0  37.0 - 54.0 fl Final   • MPV 12/17/2019 10.1  6.0 - 12.0 fL Final   • Platelets 12/17/2019 207  140 - 450 10*3/mm3 Final   • Glucose 12/17/2019 101* 65 - 99 mg/dL Final   • BUN 12/17/2019 6* 8 - 23 mg/dL Final   • Creatinine 12/17/2019 0.58  0.57 - 1.00 mg/dL Final   • Sodium 12/17/2019 136  136 - 145 mmol/L Final   • Potassium 12/17/2019 3.9  3.5 - 5.2 mmol/L Final   • Chloride 12/17/2019 101  98 - 107 mmol/L Final   • CO2 12/17/2019 20.5* 22.0 - 29.0 mmol/L Final   • Calcium 12/17/2019 8.4* 8.6 - 10.5 mg/dL Final   • eGFR Non African Amer 12/17/2019 106  >60 mL/min/1.73 Final   • BUN/Creatinine Ratio 12/17/2019 10.3  7.0 - 25.0 Final   • Anion Gap 12/17/2019 14.5  5.0 - 15.0 mmol/L Final   • Lactate 12/17/2019 1.0  0.5 - 2.0 mmol/L Final   • Glucose 12/18/2019 186* 65 - 99 mg/dL Final   • BUN 12/18/2019 7* 8 - 23 mg/dL Final   • Creatinine 12/18/2019 0.54* 0.57 - 1.00  mg/dL Final   • Sodium 12/18/2019 131* 136 - 145 mmol/L Final   • Potassium 12/18/2019 3.7  3.5 - 5.2 mmol/L Final   • Chloride 12/18/2019 95* 98 - 107 mmol/L Final   • CO2 12/18/2019 20.8* 22.0 - 29.0 mmol/L Final   • Calcium 12/18/2019 8.8  8.6 - 10.5 mg/dL Final   • Total Protein 12/18/2019 7.4  6.0 - 8.5 g/dL Final   • Albumin 12/18/2019 3.60  3.50 - 5.20 g/dL Final   • ALT (SGPT) 12/18/2019 8  1 - 33 U/L Final   • AST (SGOT) 12/18/2019 12  1 - 32 U/L Final   • Alkaline Phosphatase 12/18/2019 81  39 - 117 U/L Final   • Total Bilirubin 12/18/2019 0.4  0.2 - 1.2 mg/dL Final   • eGFR Non African Amer 12/18/2019 115  >60 mL/min/1.73 Final   • Globulin 12/18/2019 3.8  gm/dL Final   • A/G Ratio 12/18/2019 0.9  g/dL Final   • BUN/Creatinine Ratio 12/18/2019 13.0  7.0 - 25.0 Final   • Anion Gap 12/18/2019 15.2* 5.0 - 15.0 mmol/L Final   • WBC 12/18/2019 4.85  3.40 - 10.80 10*3/mm3 Final   • RBC 12/18/2019 4.01  3.77 - 5.28 10*6/mm3 Final   • Hemoglobin 12/18/2019 11.5* 12.0 - 15.9 g/dL Final   • Hematocrit 12/18/2019 34.0  34.0 - 46.6 % Final   • MCV 12/18/2019 84.8  79.0 - 97.0 fL Final   • MCH 12/18/2019 28.7  26.6 - 33.0 pg Final   • MCHC 12/18/2019 33.8  31.5 - 35.7 g/dL Final   • RDW 12/18/2019 15.6* 12.3 - 15.4 % Final   • RDW-SD 12/18/2019 48.4  37.0 - 54.0 fl Final   • MPV 12/18/2019 10.1  6.0 - 12.0 fL Final   • Platelets 12/18/2019 235  140 - 450 10*3/mm3 Final   • Neutrophil % 12/18/2019 74.1  42.7 - 76.0 % Final   • Lymphocyte % 12/18/2019 21.4  19.6 - 45.3 % Final   • Monocyte % 12/18/2019 3.7* 5.0 - 12.0 % Final   • Eosinophil % 12/18/2019 0.2* 0.3 - 6.2 % Final   • Basophil % 12/18/2019 0.2  0.0 - 1.5 % Final   • Immature Grans % 12/18/2019 0.4  0.0 - 0.5 % Final   • Neutrophils, Absolute 12/18/2019 3.59  1.70 - 7.00 10*3/mm3 Final   • Lymphocytes, Absolute 12/18/2019 1.04  0.70 - 3.10 10*3/mm3 Final   • Monocytes, Absolute 12/18/2019 0.18  0.10 - 0.90 10*3/mm3 Final   • Eosinophils, Absolute 12/18/2019  0.01  0.00 - 0.40 10*3/mm3 Final   • Basophils, Absolute 12/18/2019 0.01  0.00 - 0.20 10*3/mm3 Final   • Immature Grans, Absolute 12/18/2019 0.02  0.00 - 0.05 10*3/mm3 Final   • nRBC 12/18/2019 0.0  0.0 - 0.2 /100 WBC Final       Assessment/Plan   Problems Addressed this Visit        Other    Benzodiazepine dependence (CMS/HCC)    Insomnia due to other mental disorder    Relevant Medications    diazePAM (VALIUM) 10 MG tablet    temazepam (RESTORIL) 30 MG capsule    Generalized anxiety disorder - Primary    Relevant Medications    diazePAM (VALIUM) 10 MG tablet    temazepam (RESTORIL) 30 MG capsule          Visit Diagnoses:    ICD-10-CM ICD-9-CM   1. Generalized anxiety disorder F41.1 300.02   2. Insomnia due to other mental disorder F51.05 300.9    F99 327.02   3. Benzodiazepine dependence (CMS/HCC) F13.20 304.10       TREATMENT PLAN/GOALS: Continue supportive psychotherapy efforts and medications as indicated. Treatment and medication options discussed during today's visit. Patient ackowledged and verbally consented to continue with current treatment plan and was educated on the importance of compliance with treatment and follow-up appointments.    MEDICATION ISSUES:      INSPECT reviewed as expected - no records   ANDREY - as reported, she was prescribed diazepam 10 mg QID,   Temazepam 30 mg   The pt tried different SSRIs wihout any response, sleep deprivation caused SZ,   The pt was unable to function , started drinking alc to sleep that caused more troubles than benzos   Restart diazepam 10 mg TID for now, will start tapering down slowly,  Restart temazepam 30 mg under control of random UDS and pill counts   The pt need psychotherapy , referral list was given     Discussed medication options and treatment plan of prescribed medication as well as the risks, benefits, and side effects including potential falls, possible impaired driving and metabolic adversities among others. Patient is agreeable to call the  office with any worsening of symptoms or onset of side effects. Patient is agreeable to call 911 or go to the nearest ER should he/she begin having SI/HI. No medication side effects or related complaints today.     MEDS ORDERED DURING VISIT:  New Medications Ordered This Visit   Medications   • diazePAM (VALIUM) 10 MG tablet     Sig: Take 1 tablet by mouth 2 (Two) Times a Day for 30 days.     Dispense:  60 tablet     Refill:  0   • temazepam (RESTORIL) 30 MG capsule     Sig: Take 1 capsule by mouth At Night As Needed for Sleep.     Dispense:  30 capsule     Refill:  0       Return in about 2 months (around 2/27/2020).         This document has been electronically signed by Katey Soto MD  December 27, 2019 10:48 AM

## 2019-12-27 NOTE — PATIENT INSTRUCTIONS
Generalized Anxiety Disorder, Adult  Generalized anxiety disorder (MICHA) is a mental health disorder. People with this condition constantly worry about everyday events. Unlike normal anxiety, worry related to MICHA is not triggered by a specific event. These worries also do not fade or get better with time. MICHA interferes with life functions, including relationships, work, and school.  MICHA can vary from mild to severe. People with severe MICHA can have intense waves of anxiety with physical symptoms (panic attacks).  What are the causes?  The exact cause of MICHA is not known.  What increases the risk?  This condition is more likely to develop in:  · Women.  · People who have a family history of anxiety disorders.  · People who are very shy.  · People who experience very stressful life events, such as the death of a loved one.  · People who have a very stressful family environment.  What are the signs or symptoms?  People with MICHA often worry excessively about many things in their lives, such as their health and family. They may also be overly concerned about:  · Doing well at work.  · Being on time.  · Natural disasters.  · Friendships.  Physical symptoms of MICHA include:  · Fatigue.  · Muscle tension or having muscle twitches.  · Trembling or feeling shaky.  · Being easily startled.  · Feeling like your heart is pounding or racing.  · Feeling out of breath or like you cannot take a deep breath.  · Having trouble falling asleep or staying asleep.  · Sweating.  · Nausea, diarrhea, or irritable bowel syndrome (IBS).  · Headaches.  · Trouble concentrating or remembering facts.  · Restlessness.  · Irritability.  How is this diagnosed?  Your health care provider can diagnose MICHA based on your symptoms and medical history. You will also have a physical exam. The health care provider will ask specific questions about your symptoms, including how severe they are, when they started, and if they come and go. Your health care  provider may ask you about your use of alcohol or drugs, including prescription medicines. Your health care provider may refer you to a mental health specialist for further evaluation.  Your health care provider will do a thorough examination and may perform additional tests to rule out other possible causes of your symptoms.  To be diagnosed with MICHA, a person must have anxiety that:  · Is out of his or her control.  · Affects several different aspects of his or her life, such as work and relationships.  · Causes distress that makes him or her unable to take part in normal activities.  · Includes at least three physical symptoms of MICHA, such as restlessness, fatigue, trouble concentrating, irritability, muscle tension, or sleep problems.  Before your health care provider can confirm a diagnosis of MICHA, these symptoms must be present more days than they are not, and they must last for six months or longer.  How is this treated?  The following therapies are usually used to treat MICHA:  · Medicine. Antidepressant medicine is usually prescribed for long-term daily control. Antianxiety medicines may be added in severe cases, especially when panic attacks occur.  · Talk therapy (psychotherapy). Certain types of talk therapy can be helpful in treating MICHA by providing support, education, and guidance. Options include:  ? Cognitive behavioral therapy (CBT). People learn coping skills and techniques to ease their anxiety. They learn to identify unrealistic or negative thoughts and behaviors and to replace them with positive ones.  ? Acceptance and commitment therapy (ACT). This treatment teaches people how to be mindful as a way to cope with unwanted thoughts and feelings.  ? Biofeedback. This process trains you to manage your body's response (physiological response) through breathing techniques and relaxation methods. You will work with a therapist while machines are used to monitor your physical symptoms.  · Stress  management techniques. These include yoga, meditation, and exercise.  A mental health specialist can help determine which treatment is best for you. Some people see improvement with one type of therapy. However, other people require a combination of therapies.  Follow these instructions at home:  · Take over-the-counter and prescription medicines only as told by your health care provider.  · Try to maintain a normal routine.  · Try to anticipate stressful situations and allow extra time to manage them.  · Practice any stress management or self-calming techniques as taught by your health care provider.  · Do not punish yourself for setbacks or for not making progress.  · Try to recognize your accomplishments, even if they are small.  · Keep all follow-up visits as told by your health care provider. This is important.  Contact a health care provider if:  · Your symptoms do not get better.  · Your symptoms get worse.  · You have signs of depression, such as:  ? A persistently sad, cranky, or irritable mood.  ? Loss of enjoyment in activities that used to bring you steffany.  ? Change in weight or eating.  ? Changes in sleeping habits.  ? Avoiding friends or family members.  ? Loss of energy for normal tasks.  ? Feelings of guilt or worthlessness.  Get help right away if:  · You have serious thoughts about hurting yourself or others.  If you ever feel like you may hurt yourself or others, or have thoughts about taking your own life, get help right away. You can go to your nearest emergency department or call:  · Your local emergency services (911 in the [place]  · A suicide crisis helpline, such as the National Suicide Prevention Lifeline at 1-985.957.8165. This is open 24 hours a day.  Summary  · Generalized anxiety disorder (MICHA) is a mental health disorder that involves worry that is not triggered by a specific event.  · People with MICHA often worry excessively about many things in their lives, such as their health and  family.  · MICHA may cause physical symptoms such as restlessness, trouble concentrating, sleep problems, frequent sweating, nausea, diarrhea, headaches, and trembling or muscle twitching.  · A mental health specialist can help determine which treatment is best for you. Some people see improvement with one type of therapy. However, other people require a combination of therapies.  This information is not intended to replace advice given to you by your health care provider. Make sure you discuss any questions you have with your health care provider.  Document Released: 04/14/2014 Document Revised: 11/07/2017 Document Reviewed: 11/07/2017  [employer] Patient Education © 2019 [employer]

## 2020-01-22 ENCOUNTER — APPOINTMENT (OUTPATIENT)
Dept: CT IMAGING | Facility: HOSPITAL | Age: 60
End: 2020-01-22

## 2020-01-22 ENCOUNTER — HOSPITAL ENCOUNTER (EMERGENCY)
Facility: HOSPITAL | Age: 60
Discharge: HOME | End: 2020-01-23
Attending: EMERGENCY MEDICINE | Admitting: EMERGENCY MEDICINE

## 2020-01-22 DIAGNOSIS — F10.920 ALCOHOLIC INTOXICATION WITHOUT COMPLICATION (HCC): Primary | ICD-10-CM
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
## 2020-01-22 DIAGNOSIS — S09.90XA CLOSED HEAD INJURY, INITIAL ENCOUNTER: ICD-10-CM

## 2020-01-22 LAB
ETHANOL BLD-MCNC: 247 MG/DL (ref 0–10)
ETHANOL UR QL: 0.25 %
HOLD SPECIMEN: NORMAL
HOLD SPECIMEN: NORMAL
WHOLE BLOOD HOLD SPECIMEN: NORMAL
WHOLE BLOOD HOLD SPECIMEN: NORMAL

## 2020-01-22 PROCEDURE — 72125 CT NECK SPINE W/O DYE: CPT

## 2020-01-22 PROCEDURE — 80307 DRUG TEST PRSMV CHEM ANLYZR: CPT | Performed by: PHYSICIAN ASSISTANT

## 2020-01-22 PROCEDURE — 70450 CT HEAD/BRAIN W/O DYE: CPT

## 2020-01-22 PROCEDURE — 99284 EMERGENCY DEPT VISIT MOD MDM: CPT

## 2020-01-22 RX ADMIN — SODIUM CHLORIDE 1000 ML: 9 INJECTION, SOLUTION INTRAVENOUS at 23:56

## 2020-01-23 VITALS
RESPIRATION RATE: 18 BRPM | DIASTOLIC BLOOD PRESSURE: 83 MMHG | HEIGHT: 69 IN | OXYGEN SATURATION: 94 % | HEART RATE: 94 BPM | TEMPERATURE: 97.7 F | WEIGHT: 175.4 LBS | BODY MASS INDEX: 25.98 KG/M2 | SYSTOLIC BLOOD PRESSURE: 141 MMHG

## 2020-01-23 LAB
ETHANOL BLD-MCNC: <10 MG/DL (ref 0–10)
ETHANOL UR QL: <0.01 %

## 2020-01-23 PROCEDURE — 80307 DRUG TEST PRSMV CHEM ANLYZR: CPT | Performed by: EMERGENCY MEDICINE

## 2020-01-23 NOTE — ED NOTES
"Pt reports insomnia, had 3 \"strong\" gin drinks tonight.  Pt reports her  was at home and called EMS r/t a fall.  Pt doesn't recall falling, c/o headache and chronic back pain.  Pt denies anticoagulants.  Pt denies HI, but admits to looking into \"assisted suicide.\"  Pt reports tonight with ETOH consumption that she was \"trying to sleep.\"     Breonna Robison, RN  01/22/20 2014    "

## 2020-01-23 NOTE — ED PROVIDER NOTES
Pt presents to the ED with a reported fall that occurred PTA. Pt states that she cannot remember the fall, but she notes that she had had quite a bit to drink before falling asleep. Pt reports that she has Hx of insomnia and had been using alcohol to help fall asleep. She denies any complaints.    On exam,   Pt is resting comfortably, in no distress, and without focal neurologic deficit, pt appear intoxicated, CV nl, no murmur, lungs CTAB.    I agree with midlevel plan to discharge the pt when EtOH is within nl limits.    Progress and Consults:  0000: Pt cared transferred from LAILA Page to Dr. Dan pending disposition.    0600: pt sober and without further complaints    Final diagnoses:   Alcoholic intoxication without complication (CMS/HCC)   Closed head injury, initial encounter   Fall, initial encounter         Disposition:  DISCHARGE    Patient discharged in stable condition.    Reviewed implications of results, diagnosis, meds, responsibility to follow up, warning signs and symptoms of possible worsening, potential complications and reasons to return to ER.    Patient/Family voiced understanding of above instructions.    Discussed plan for discharge, as there is no emergent indication for admission. Patient referred to primary care provider for BP management due to today's BP. Pt/family is agreeable and understands need for follow up and repeat testing.  Pt is aware that discharge does not mean that nothing is wrong but it indicates no emergency is present that requires admission and they must continue care with follow-up as given below or physician of their choice.     FOLLOW-UP  Trenton Cerrato Jr., DO  [address]  [place] [address] [phone]    Schedule an appointment as soon as possible for a visit           Medication List      No changes were made to your prescriptions during this visit.           The PAT and I have discussed this patient's history, physical exam, and treatment  plan.  I have reviewed the documentation and personally had a face to face interaction with the patient. I affirm the documentation and agree with the treatment and plan.  The attached note describes my personal findings.    Documentation assistance provided by carl Gonzalez for Dr. Dan.  Information recorded by the carl was done at my direction and has been verified and validated by me.       Lorna Gonzalez  01/23/20 0258       Trevor Dan MD  01/23/20 8829

## 2020-01-23 NOTE — ED NOTES
"PT from home, has had a lot to drink today. PT states she has insomnia and \"hasn't slept for days,\" \"so I drink.\"  Pt is unable to say what happened today besides that she drank heavily      Elier Duong, RN  01/22/20 1952    "

## 2020-01-23 NOTE — ED PROVIDER NOTES
EMERGENCY DEPARTMENT ENCOUNTER    Room Number:  22/22  Date seen:  1/22/2020  Time seen: 8:28 PM  PCP: Trenton Cerrato Jr.,     HPI:  Chief complaint: fall, alcohol intoxication  Context:Ada Ngo is a 60 y.o. female who presents to the ED for a fall that occurred just PTA. Pt states her  called 911. Pt states she doesn't remember falling today and doesn't think he sustained any injuries from the fall. Pt does admit to drinking gin tonight to help her sleep. Pt states she had around a third of a fifth. Pt states she has issues with sleeping and will sometimes be awake for 7 to 8 days. She will drink to help her sleep. She denies drinking frequently. Pt states she will drink every 5 to 7 days. She denies being on blood thinners. She denies HI/SI.       ALLERGIES  Augmentin [amoxicillin-pot clavulanate]; Cephalosporins; Zyprexa [olanzapine]; Amoxicillin; and Zolpidem    PAST MEDICAL HISTORY  Active Ambulatory Problems     Diagnosis Date Noted   • Benzodiazepine dependence (CMS/HCC) 12/27/2016   • Psychosis (CMS/HCC) 10/06/2018   • Sarcoidosis 04/29/2019   • Drug-seeking behavior 04/29/2019   • Chronic midline low back pain without sciatica 08/05/2019   • Insomnia due to other mental disorder 08/05/2019   • Essential hypertension 08/05/2019   • Pneumonia of left lower lobe due to infectious organism (CMS/HCC) 12/16/2019   • Alcoholism /alcohol abuse (CMS/HCC) 12/16/2019   • Tobacco dependence 12/16/2019   • Generalized anxiety disorder 12/18/2019     Resolved Ambulatory Problems     Diagnosis Date Noted   • No Resolved Ambulatory Problems     Past Medical History:   Diagnosis Date   • Anxiety    • Coronary artery disease    • Depression    • Hypertension    • Other insomnia 8/5/2019   • Pulmonary emboli (CMS/HCC)    • Stroke (CMS/HCC)        PAST SURGICAL HISTORY  Past Surgical History:   Procedure Laterality Date   • OVARIAN CYST REMOVAL     • SINUS SURGERY         FAMILY HISTORY  Family History    Problem Relation Age of Onset   • Heart attack Mother    • Heart disease Mother    • Heart disease Father        SOCIAL HISTORY  Social History     Socioeconomic History   • Marital status:      Spouse name: Not on file   • Number of children: Not on file   • Years of education: Not on file   • Highest education level: Not on file   Tobacco Use   • Smoking status: Current Every Day Smoker     Packs/day: 2.00     Types: Cigarettes   • Smokeless tobacco: Never Used   Substance and Sexual Activity   • Alcohol use: No     Frequency: Never   • Drug use: Defer   • Sexual activity: Defer       REVIEW OF SYSTEMS  Review of Systems   Constitutional: Negative for chills and fever.   HENT: Negative.    Eyes: Negative.    Respiratory: Negative for shortness of breath.    Cardiovascular: Negative for chest pain.   Gastrointestinal: Negative for abdominal pain.   Genitourinary: Negative.    Musculoskeletal: Negative.    Skin: Negative.    Neurological: Negative.    Psychiatric/Behavioral: Positive for sleep disturbance. Negative for suicidal ideas.       PHYSICAL EXAM  ED Triage Vitals   Temp Heart Rate Resp BP SpO2   01/22/20 2003 01/22/20 1953 01/22/20 1953 01/22/20 1953 01/22/20 1953   97.7 °F (36.5 °C) 68 18 154/84 100 %      Temp src Heart Rate Source Patient Position BP Location FiO2 (%)   01/22/20 2003 01/22/20 1953 -- -- --   Oral Monitor        Physical Exam   Constitutional: No distress.   HENT:   Head: Normocephalic and atraumatic. Head is without raccoon's eyes and without Wang's sign.   Right Ear: No hemotympanum.   Left Ear: No hemotympanum.   Nose: No nasal septal hematoma.   Eyes: Pupils are equal, round, and reactive to light. EOM are normal.   Neck: Normal range of motion. No spinous process tenderness and no muscular tenderness present.   Cardiovascular: Normal rate and regular rhythm.   Pulmonary/Chest: Effort normal and breath sounds normal. No respiratory distress.   Abdominal: Soft. There is no  tenderness.   Musculoskeletal: Normal range of motion. She exhibits no edema.   No T or L spine tenderness. Extremities atraumatic and nontender with appropriate ROM   Neurological: She is alert. She has normal sensation and normal strength. GCS score is 15.   Drowsy but interacts appropriately    Skin: Skin is warm.   Nursing note and vitals reviewed.      LAB RESULTS  Recent Results (from the past 24 hour(s))   Light Blue Top    Collection Time: 01/22/20  8:25 PM   Result Value Ref Range    Extra Tube hold for add-on    Green Top (Gel)    Collection Time: 01/22/20  8:25 PM   Result Value Ref Range    Extra Tube Hold for add-ons.    Lavender Top    Collection Time: 01/22/20  8:25 PM   Result Value Ref Range    Extra Tube hold for add-on    Gold Top - SST    Collection Time: 01/22/20  8:25 PM   Result Value Ref Range    Extra Tube Hold for add-ons.    Ethanol    Collection Time: 01/22/20  8:25 PM   Result Value Ref Range    Ethanol 247 (H) 0 - 10 mg/dL    Ethanol % 0.247 %       I ordered the above labs and reviewed the results    RADIOLOGY  CT Head Without Contrast   Final Result       1. No acute intracranial hemorrhage.   2. No acute fracture or subluxation of the cervical spine is identified.       Radiation dose reduction techniques were utilized, including automated   exposure control and exposure modulation based on body size.       This report was finalized on 1/22/2020 10:23 PM by Dr. Alyce Rodriguez M.D.          CT Cervical Spine Without Contrast   Final Result       1. No acute intracranial hemorrhage.   2. No acute fracture or subluxation of the cervical spine is identified.       Radiation dose reduction techniques were utilized, including automated   exposure control and exposure modulation based on body size.       This report was finalized on 1/22/2020 10:23 PM by Dr. Alyce Rodriguez M.D.              I ordered the above noted radiological studies and reviewed the images on the PACS system.   "    MEDICATIONS GIVEN IN ER  Medications - No data to display      PROCEDURES  Procedures      PROGRESS AND CONSULTS    Progress Notes:    ED Course as of Jan 24 2352 Wed Jan 22, 2020 2139 With patient's permission, I called .  states he had just walked out of the kitchen and he heard a \"thump.\" He walked back and she was lying on the ground at the entrance to their bathroom. He was concerned and called EMS.  and patient state no one can give her a ride home. He is ill and does not have a car and they have no friends or family.    [KA]   3618  Reviewed pt's history and workup with Dr. Dan.  After a bedside evaluation; Dr Dan agrees with the plan of care and will assume full course of care at this time pending sobriety to be safely discharged.       [KA]      ED Course User Index  [KA] Lavinia Cheema PA         1765 Reviewed pt's history and workup with Dr. Dan.  After a bedside evaluation; Dr Dan agrees with the plan of care and will assume full course of care at this time.      Disposition vitals:  BP 95/69   Pulse 68   Temp 97.7 °F (36.5 °C) (Oral)   Resp 18   Ht 175.3 cm (69\")   Wt 79.6 kg (175 lb 6.4 oz)   SpO2 95%   BMI 25.90 kg/m²       DIAGNOSIS  Final diagnoses:   Alcoholic intoxication without complication (CMS/HCC)   Closed head injury, initial encounter   Fall, initial encounter       FOLLOW UP   Trenton Cerrato Jr., DO  [address] [place] [id number] [phone]    Schedule an appointment as soon as possible for a visit        RX     Medication List      No changes were made to your prescriptions during this visit.           Documentation assistance provided by carl Clancy for Lavinia Cheema PA-C.  Information recorded by the scrirene was done at my direction and has been verified and validated by me.            Khari Clancy  01/22/20 2304       Lavinia Cheema PA  01/24/20 3514    "

## 2020-01-24 DIAGNOSIS — F99 INSOMNIA DUE TO OTHER MENTAL DISORDER: ICD-10-CM

## 2020-01-24 DIAGNOSIS — F51.05 INSOMNIA DUE TO OTHER MENTAL DISORDER: ICD-10-CM

## 2020-01-24 DIAGNOSIS — F41.1 GENERALIZED ANXIETY DISORDER: ICD-10-CM

## 2020-01-24 RX ORDER — TEMAZEPAM 30 MG/1
30 CAPSULE ORAL NIGHTLY PRN
Qty: 30 CAPSULE | Refills: 0 | Status: SHIPPED | OUTPATIENT
Start: 2020-01-24 | End: 2020-02-21 | Stop reason: SDUPTHER

## 2020-01-24 RX ORDER — DIAZEPAM 10 MG/1
10 TABLET ORAL 2 TIMES DAILY
Qty: 60 TABLET | Refills: 0 | Status: SHIPPED | OUTPATIENT
Start: 2020-01-24 | End: 2020-02-21 | Stop reason: SDUPTHER

## 2020-02-06 ENCOUNTER — TRANSCRIBE ORDERS (OUTPATIENT)
Dept: ADMINISTRATIVE | Facility: HOSPITAL | Age: 60
End: 2020-02-06

## 2020-02-06 ENCOUNTER — TELEPHONE (OUTPATIENT)
Dept: PSYCHIATRY | Facility: CLINIC | Age: 60
End: 2020-02-06

## 2020-02-06 DIAGNOSIS — J18.9 PNEUMONIA DUE TO INFECTIOUS ORGANISM, UNSPECIFIED LATERALITY, UNSPECIFIED PART OF LUNG: Primary | ICD-10-CM

## 2020-02-06 NOTE — TELEPHONE ENCOUNTER
She can not come on that particular day or can not come for randoms in general?   If in general - she signed controlled substance agreement and that means controlled substances wont be prescribed .

## 2020-02-07 NOTE — TELEPHONE ENCOUNTER
I think she needs to find provider in [place], because she has Passport and her meds area not covered if prescribed by the provider who does not participate in [employer]port

## 2020-02-21 ENCOUNTER — OFFICE VISIT (OUTPATIENT)
Dept: PSYCHIATRY | Facility: CLINIC | Age: 60
End: 2020-02-21

## 2020-02-21 DIAGNOSIS — F99 INSOMNIA DUE TO OTHER MENTAL DISORDER: ICD-10-CM

## 2020-02-21 DIAGNOSIS — F13.20 BENZODIAZEPINE DEPENDENCE (HCC): Primary | ICD-10-CM

## 2020-02-21 DIAGNOSIS — F51.05 INSOMNIA DUE TO OTHER MENTAL DISORDER: ICD-10-CM

## 2020-02-21 DIAGNOSIS — F41.1 GENERALIZED ANXIETY DISORDER: ICD-10-CM

## 2020-02-21 PROCEDURE — 99214 OFFICE O/P EST MOD 30 MIN: CPT | Performed by: PSYCHIATRY & NEUROLOGY

## 2020-02-21 RX ORDER — DIAZEPAM 5 MG/1
5 TABLET ORAL 3 TIMES DAILY
Qty: 90 TABLET | Refills: 0 | Status: SHIPPED | OUTPATIENT
Start: 2020-02-21 | End: 2020-03-27

## 2020-02-21 RX ORDER — TEMAZEPAM 30 MG/1
30 CAPSULE ORAL NIGHTLY PRN
Qty: 30 CAPSULE | Refills: 0 | Status: SHIPPED | OUTPATIENT
Start: 2020-02-21 | End: 2020-03-27

## 2020-02-21 NOTE — PROGRESS NOTES
"Subjective   Ada Ngo is a 60 y.o. female who presents today for follow up    Chief Complaint:  \"I am completely a basket case\"       History of Present Illness:   20 years ago the pt was dsd with sarcoidosis , since that she had issues with sleep , temazepam 30 mg and diazepam 10 mg QID   PCP was prescribed those meds  Up until last year when PCP stopped prescribing those meds .  The pt was admitted to [facility] 2ry to pneumonia , CT found a mass in L lung, waiting for appt with pulmonologist, she fell at home, already had back surgery, broke a rib. The pt feels more depressed, anxious, reported poor sleep ,   Rated depression as 8/10, feels hopeless and helpless, denied AVH/SI/HI now, but thinks it will happen in the future. She is in pain , off pain meds,     In order to sleep - she drinks , had alc last night   She tried different meds - side effects or not effective     The pt worries about dying before her  dies, worries about him being alone     The following portions of the patient's history were reviewed and updated as appropriate: allergies, current medications, past family history, past medical history, past social history, past surgical history and problem list.    PAST PSYCHIATRIC HISTORY  Axis I  Anxiety/Panic Disorder, insomnia     Axis II  None    PAST OUTPATIENT TREATMENT  Diagnosis treated:  Addictive Disorder, Anxiety/Panic Disorder  Psych in [place] - tried a lot of meds, decided to go back to benzo and temazepam that used to work in the past   [facility]     Treatment Type:  Medication Management  Prior Psychiatric Medications:  Prozac, sertraline - side effects,  paxil - not effective  Vistaril - not effective  neuronitn - caused heavy abN vaginal bleeding   Risperidone - not effective and side effects   seroquel - restless legs     Support Groups:  None   Sequelae Of Mental Disorder:  medical illness, emotional distress      Interval History  Deteriorated    Side Effects  Denied "       Past Medical History:  Past Medical History:   Diagnosis Date   • Anxiety    • Coronary artery disease    • Depression    • Hypertension    • Other insomnia 8/5/2019    Refer to sleep specialist   • Pulmonary emboli (CMS/[facility])    • Stroke (CMS/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)   • Psychosis (CMS/[facility])   • Sarcoidosis   • Drug-seeking behavior   • Chronic midline low back pain without sciatica   • Insomnia due to other mental disorder   • Essential hypertension   • Pneumonia of left lower lobe due to infectious organism (CMS/[facility])   • Alcoholism /alcohol abuse (CMS/[facility])   • Tobacco dependence   • Generalized anxiety disorder       Allergy:   Allergies   Allergen Reactions   • Augmentin [Amoxicillin-Pot Clavulanate] Anaphylaxis   • Cephalosporins Anaphylaxis   • Zyprexa [Olanzapine] Anaphylaxis   • Amoxicillin Unknown (See Comments)   • Zolpidem Unknown (See Comments)        Discontinued Medications:  Medications Discontinued During This Encounter   Medication Reason   • temazepam (RESTORIL) 30 MG  capsule Reorder   • diazePAM (VALIUM) 10 MG tablet Reorder       Current Medications:   Current Outpatient Medications   Medication Sig Dispense Refill   • aspirin 325 MG tablet Take 325 mg by mouth Daily.     • diazePAM (VALIUM) 5 MG tablet Take 1 tablet by mouth 3 (Three) Times a Day for 30 days. 90 tablet 0   • fexofenadine (ALLEGRA) 60 MG tablet Take 60 mg by mouth Daily.     • metoprolol tartrate (LOPRESSOR) 25 MG tablet Take 25 mg by mouth 2 (Two) Times a Day.  10   • nicotine (NICODERM CQ) 7 MG/24HR patch Place 1 patch on the skin as directed by provider daily. 14 patch 0   • temazepam (RESTORIL) 30 MG capsule Take 1 capsule by mouth At Night As Needed for Sleep. 30 capsule 0   • tiotropium bromide-olodaterol (STIOLTO RESPIMAT) 2.5-2.5 MCG/ACT aerosol solution inhaler Inhale 2 puffs by mouth daily. 4 g 0     No current facility-administered medications for this visit.          Review of Symptoms:    Psychiatric/Behavioral: Negative for agitation, behavioral problems, confusion, decreased concentration, dysphoric mood, hallucinations, self-injury, sleep disturbance and suicidal ideas.   The patient is  nervous/anxious and is not hyperactive.        Physical Exam:   There were no vitals taken for this visit.    Mental Status Exam:   Hygiene:   good, wearing a mask (recovering from a cold)   Cooperation:  Cooperative  Eye Contact:  Good  Psychomotor Behavior:  Appropriate  Affect:  Full range  Mood: fluctates, nervous   Hopelessness: Denies  Speech:  Normal  Thought Process:  Goal directed and Linear  Thought Content:  Normal  Suicidal:  None  Homicidal:  None  Hallucinations:  None  Delusion:  None  Memory:  fair   Orientation:  Person, Place, Time and Situation  Reliability:  fair  Insight:  Fair  Judgement:  Impaired  Impulse Control:  Fair  Physical/Medical Issues:  Yes  sarcoidosis        MSE from 12/27/19 reviewed and accepted with changes     PHQ-9 Depression Screening  Little interest or pleasure in  doing things? 3   Feeling down, depressed, or hopeless? 3   Trouble falling or staying asleep, or sleeping too much? 3   Feeling tired or having little energy? 2   Poor appetite or overeating? 0   Feeling bad about yourself - or that you are a failure or have let yourself or your family down? 2   Trouble concentrating on things, such as reading the newspaper or watching television? 0   Moving or speaking so slowly that other people could have noticed? Or the opposite - being so fidgety or restless that you have been moving around a lot more than usual? 0   Thoughts that you would be better off dead, or of hurting yourself in some way? 1   PHQ-9 Total Score 14   If you checked off any problems, how difficult have these problems made it for you to do your work, take care of things at home, or get along with other people? Extremely dIfficult           Current every day smoker 3-10 mintues spent counseling Has reduced Tobbacos use    I advised Ada of the risks of tobacco use.     Lab Results:   Admission on 01/22/2020, Discharged on 01/23/2020   Component Date Value Ref Range Status   • Extra Tube 01/22/2020 hold for add-on   Final    Auto resulted   • Extra Tube 01/22/2020 Hold for add-ons.   Final    Auto resulted.   • Extra Tube 01/22/2020 hold for add-on   Final    Auto resulted   • Extra Tube 01/22/2020 Hold for add-ons.   Final    Auto resulted.   • Ethanol 01/22/2020 247* 0 - 10 mg/dL Final   • Ethanol % 01/22/2020 0.247  % Final   • Ethanol 01/23/2020 <10  0 - 10 mg/dL Final   • Ethanol % 01/23/2020 <0.010  % Final   Admission on 12/16/2019, Discharged on 12/18/2019   Component Date Value Ref Range Status   • Glucose 12/16/2019 140* 65 - 99 mg/dL Final   • BUN 12/16/2019 11  8 - 23 mg/dL Final   • Creatinine 12/16/2019 0.62  0.57 - 1.00 mg/dL Final   • Sodium 12/16/2019 139  136 - 145 mmol/L Final   • Potassium 12/16/2019 3.8  3.5 - 5.2 mmol/L Final   • Chloride 12/16/2019 98  98 - 107 mmol/L Final   • CO2  12/16/2019 23.5  22.0 - 29.0 mmol/L Final   • Calcium 12/16/2019 9.7  8.6 - 10.5 mg/dL Final   • Total Protein 12/16/2019 8.3  6.0 - 8.5 g/dL Final   • Albumin 12/16/2019 4.60  3.50 - 5.20 g/dL Final   • ALT (SGPT) 12/16/2019 9  1 - 33 U/L Final   • AST (SGOT) 12/16/2019 15  1 - 32 U/L Final   • Alkaline Phosphatase 12/16/2019 107  39 - 117 U/L Final   • Total Bilirubin 12/16/2019 0.6  0.2 - 1.2 mg/dL Final   • eGFR Non African Amer 12/16/2019 98  >60 mL/min/1.73 Final   • Globulin 12/16/2019 3.7  gm/dL Final   • A/G Ratio 12/16/2019 1.2  g/dL Final   • BUN/Creatinine Ratio 12/16/2019 17.7  7.0 - 25.0 Final   • Anion Gap 12/16/2019 17.5* 5.0 - 15.0 mmol/L Final   • Protime 12/16/2019 12.3  11.7 - 14.2 Seconds Final   • INR 12/16/2019 0.94  0.90 - 1.10 Final   • Troponin T 12/16/2019 <0.010  0.000 - 0.030 ng/mL Final   • D-Dimer, Quantitative 12/16/2019 1.68* 0.00 - 0.49 MCGFEU/mL Final   • proBNP 12/16/2019 1,734.0* 5.0 - 900.0 pg/mL Final   • Blood Culture 12/16/2019 No growth at 5 days   Final   • Blood Culture 12/16/2019 No growth at 5 days   Final   • Lactate 12/16/2019 2.4* 0.5 - 2.0 mmol/L Final   • WBC 12/16/2019 13.47* 3.40 - 10.80 10*3/mm3 Final   • RBC 12/16/2019 4.66  3.77 - 5.28 10*6/mm3 Final   • Hemoglobin 12/16/2019 13.3  12.0 - 15.9 g/dL Final   • Hematocrit 12/16/2019 39.9  34.0 - 46.6 % Final   • MCV 12/16/2019 85.6  79.0 - 97.0 fL Final   • MCH 12/16/2019 28.5  26.6 - 33.0 pg Final   • MCHC 12/16/2019 33.3  31.5 - 35.7 g/dL Final   • RDW 12/16/2019 16.6* 12.3 - 15.4 % Final   • RDW-SD 12/16/2019 51.2  37.0 - 54.0 fl Final   • MPV 12/16/2019 9.7  6.0 - 12.0 fL Final   • Platelets 12/16/2019 336  140 - 450 10*3/mm3 Final   • Neutrophil % 12/16/2019 80.1* 42.7 - 76.0 % Final   • Lymphocyte % 12/16/2019 13.2* 19.6 - 45.3 % Final   • Monocyte % 12/16/2019 3.9* 5.0 - 12.0 % Final   • Eosinophil % 12/16/2019 1.5  0.3 - 6.2 % Final   • Basophil % 12/16/2019 0.7  0.0 - 1.5 % Final   • Immature Grans %  12/16/2019 0.6* 0.0 - 0.5 % Final   • Neutrophils, Absolute 12/16/2019 10.79* 1.70 - 7.00 10*3/mm3 Final   • Lymphocytes, Absolute 12/16/2019 1.78  0.70 - 3.10 10*3/mm3 Final   • Monocytes, Absolute 12/16/2019 0.53  0.10 - 0.90 10*3/mm3 Final   • Eosinophils, Absolute 12/16/2019 0.20  0.00 - 0.40 10*3/mm3 Final   • Basophils, Absolute 12/16/2019 0.09  0.00 - 0.20 10*3/mm3 Final   • Immature Grans, Absolute 12/16/2019 0.08* 0.00 - 0.05 10*3/mm3 Final   • nRBC 12/16/2019 0.0  0.0 - 0.2 /100 WBC Final   • Procalcitonin 12/16/2019 0.06* 0.10 - 0.25 ng/mL Final   • Extra Tube 12/16/2019 Hold for add-ons.   Final    Auto resulted.   • Respiratory Culture 12/16/2019 Scant growth (1+) Normal Respiratory Loli   Final   • Gram Stain 12/16/2019 Few (2+) Budding yeast   Final   • Gram Stain 12/16/2019 Rare (1+) Mixed bacterial morphotypes seen on Gram Stain   Final   • Gram Stain 12/16/2019 Few (2+) Epithelial cells per low power field   Final   • Gram Stain 12/16/2019 Rare (1+) WBCs per low power field   Final   • LEGIONELLA ANTIGEN, URINE 12/16/2019 Negative  Negative Final   • ADENOVIRUS, PCR 12/16/2019 Not Detected  Not Detected Final   • Coronavirus 229E 12/16/2019 Not Detected  Not Detected Final   • Coronavirus HKU1 12/16/2019 Not Detected  Not Detected Final   • Coronavirus NL63 12/16/2019 Not Detected  Not Detected Final   • Coronavirus OC43 12/16/2019 Not Detected  Not Detected Final   • Human Metapneumovirus 12/16/2019 Not Detected  Not Detected Final   • Human Rhinovirus/Enterovirus 12/16/2019 Not Detected  Not Detected Final   • Influenza B PCR 12/16/2019 Not Detected  Not Detected Final   • Parainfluenza Virus 1 12/16/2019 Not Detected  Not Detected Final   • Parainfluenza Virus 2 12/16/2019 Not Detected  Not Detected Final   • Parainfluenza Virus 3 12/16/2019 Not Detected  Not Detected Final   • Parainfluenza Virus 4 12/16/2019 Not Detected  Not Detected Final   • Bordetella pertussis pcr 12/16/2019 Not Detected   Not Detected Final   • Influenza A H1 2009 PCR 12/16/2019 Not Detected  Not Detected Final   • Chlamydophila pneumoniae PCR 12/16/2019 Not Detected  Not Detected Final   • Mycoplasma pneumo by PCR 12/16/2019 Not Detected  Not Detected Final   • Influenza A PCR 12/16/2019 Not Detected  Not Detected Final   • Influenza A H3 12/16/2019 Not Detected  Not Detected Final   • Influenza A H1 12/16/2019 Not Detected  Not Detected Final   • RSV, PCR 12/16/2019 Not Detected  Not Detected Final   • Bordetella parapertussis PCR 12/16/2019 Not Detected  Not Detected Final   • Strep Pneumo Ag 12/16/2019 Negative  Negative Final   • Glucose 12/16/2019 130* 65 - 99 mg/dL Final   • BUN 12/16/2019 9  8 - 23 mg/dL Final   • Creatinine 12/16/2019 0.50* 0.57 - 1.00 mg/dL Final   • Sodium 12/16/2019 137  136 - 145 mmol/L Final   • Potassium 12/16/2019 3.8  3.5 - 5.2 mmol/L Final   • Chloride 12/16/2019 98  98 - 107 mmol/L Final   • CO2 12/16/2019 22.4  22.0 - 29.0 mmol/L Final   • Calcium 12/16/2019 8.9  8.6 - 10.5 mg/dL Final   • eGFR Non African Amer 12/16/2019 125  >60 mL/min/1.73 Final   • BUN/Creatinine Ratio 12/16/2019 18.0  7.0 - 25.0 Final   • Anion Gap 12/16/2019 16.6* 5.0 - 15.0 mmol/L Final   • WBC 12/16/2019 10.79  3.40 - 10.80 10*3/mm3 Final   • RBC 12/16/2019 4.55  3.77 - 5.28 10*6/mm3 Final   • Hemoglobin 12/16/2019 12.8  12.0 - 15.9 g/dL Final   • Hematocrit 12/16/2019 38.2  34.0 - 46.6 % Final   • MCV 12/16/2019 84.0  79.0 - 97.0 fL Final   • MCH 12/16/2019 28.1  26.6 - 33.0 pg Final   • MCHC 12/16/2019 33.5  31.5 - 35.7 g/dL Final   • RDW 12/16/2019 16.5* 12.3 - 15.4 % Final   • RDW-SD 12/16/2019 50.6  37.0 - 54.0 fl Final   • MPV 12/16/2019 9.5  6.0 - 12.0 fL Final   • Platelets 12/16/2019 301  140 - 450 10*3/mm3 Final   • Lactate 12/16/2019 2.0  0.5 - 2.0 mmol/L Final   • WBC 12/17/2019 5.03  3.40 - 10.80 10*3/mm3 Final   • RBC 12/17/2019 3.72* 3.77 - 5.28 10*6/mm3 Final   • Hemoglobin 12/17/2019 10.6* 12.0 - 15.9  g/dL Final   • Hematocrit 12/17/2019 31.5* 34.0 - 46.6 % Final   • MCV 12/17/2019 84.7  79.0 - 97.0 fL Final   • MCH 12/17/2019 28.5  26.6 - 33.0 pg Final   • MCHC 12/17/2019 33.7  31.5 - 35.7 g/dL Final   • RDW 12/17/2019 15.7* 12.3 - 15.4 % Final   • RDW-SD 12/17/2019 48.0  37.0 - 54.0 fl Final   • MPV 12/17/2019 10.1  6.0 - 12.0 fL Final   • Platelets 12/17/2019 207  140 - 450 10*3/mm3 Final   • Glucose 12/17/2019 101* 65 - 99 mg/dL Final   • BUN 12/17/2019 6* 8 - 23 mg/dL Final   • Creatinine 12/17/2019 0.58  0.57 - 1.00 mg/dL Final   • Sodium 12/17/2019 136  136 - 145 mmol/L Final   • Potassium 12/17/2019 3.9  3.5 - 5.2 mmol/L Final   • Chloride 12/17/2019 101  98 - 107 mmol/L Final   • CO2 12/17/2019 20.5* 22.0 - 29.0 mmol/L Final   • Calcium 12/17/2019 8.4* 8.6 - 10.5 mg/dL Final   • eGFR Non African Amer 12/17/2019 106  >60 mL/min/1.73 Final   • BUN/Creatinine Ratio 12/17/2019 10.3  7.0 - 25.0 Final   • Anion Gap 12/17/2019 14.5  5.0 - 15.0 mmol/L Final   • Lactate 12/17/2019 1.0  0.5 - 2.0 mmol/L Final   • Glucose 12/18/2019 186* 65 - 99 mg/dL Final   • BUN 12/18/2019 7* 8 - 23 mg/dL Final   • Creatinine 12/18/2019 0.54* 0.57 - 1.00 mg/dL Final   • Sodium 12/18/2019 131* 136 - 145 mmol/L Final   • Potassium 12/18/2019 3.7  3.5 - 5.2 mmol/L Final   • Chloride 12/18/2019 95* 98 - 107 mmol/L Final   • CO2 12/18/2019 20.8* 22.0 - 29.0 mmol/L Final   • Calcium 12/18/2019 8.8  8.6 - 10.5 mg/dL Final   • Total Protein 12/18/2019 7.4  6.0 - 8.5 g/dL Final   • Albumin 12/18/2019 3.60  3.50 - 5.20 g/dL Final   • ALT (SGPT) 12/18/2019 8  1 - 33 U/L Final   • AST (SGOT) 12/18/2019 12  1 - 32 U/L Final   • Alkaline Phosphatase 12/18/2019 81  39 - 117 U/L Final   • Total Bilirubin 12/18/2019 0.4  0.2 - 1.2 mg/dL Final   • eGFR Non African Amer 12/18/2019 115  >60 mL/min/1.73 Final   • Globulin 12/18/2019 3.8  gm/dL Final   • A/G Ratio 12/18/2019 0.9  g/dL Final   • BUN/Creatinine Ratio 12/18/2019 13.0  7.0 - 25.0 Final    • Anion Gap 12/18/2019 15.2* 5.0 - 15.0 mmol/L Final   • WBC 12/18/2019 4.85  3.40 - 10.80 10*3/mm3 Final   • RBC 12/18/2019 4.01  3.77 - 5.28 10*6/mm3 Final   • Hemoglobin 12/18/2019 11.5* 12.0 - 15.9 g/dL Final   • Hematocrit 12/18/2019 34.0  34.0 - 46.6 % Final   • MCV 12/18/2019 84.8  79.0 - 97.0 fL Final   • MCH 12/18/2019 28.7  26.6 - 33.0 pg Final   • MCHC 12/18/2019 33.8  31.5 - 35.7 g/dL Final   • RDW 12/18/2019 15.6* 12.3 - 15.4 % Final   • RDW-SD 12/18/2019 48.4  37.0 - 54.0 fl Final   • MPV 12/18/2019 10.1  6.0 - 12.0 fL Final   • Platelets 12/18/2019 235  140 - 450 10*3/mm3 Final   • Neutrophil % 12/18/2019 74.1  42.7 - 76.0 % Final   • Lymphocyte % 12/18/2019 21.4  19.6 - 45.3 % Final   • Monocyte % 12/18/2019 3.7* 5.0 - 12.0 % Final   • Eosinophil % 12/18/2019 0.2* 0.3 - 6.2 % Final   • Basophil % 12/18/2019 0.2  0.0 - 1.5 % Final   • Immature Grans % 12/18/2019 0.4  0.0 - 0.5 % Final   • Neutrophils, Absolute 12/18/2019 3.59  1.70 - 7.00 10*3/mm3 Final   • Lymphocytes, Absolute 12/18/2019 1.04  0.70 - 3.10 10*3/mm3 Final   • Monocytes, Absolute 12/18/2019 0.18  0.10 - 0.90 10*3/mm3 Final   • Eosinophils, Absolute 12/18/2019 0.01  0.00 - 0.40 10*3/mm3 Final   • Basophils, Absolute 12/18/2019 0.01  0.00 - 0.20 10*3/mm3 Final   • Immature Grans, Absolute 12/18/2019 0.02  0.00 - 0.05 10*3/mm3 Final   • nRBC 12/18/2019 0.0  0.0 - 0.2 /100 WBC Final       Assessment/Plan   Problems Addressed this Visit        Other    Benzodiazepine dependence (CMS/HCC) - Primary    Insomnia due to other mental disorder    Relevant Medications    temazepam (RESTORIL) 30 MG capsule    diazePAM (VALIUM) 5 MG tablet    Generalized anxiety disorder    Relevant Medications    temazepam (RESTORIL) 30 MG capsule    diazePAM (VALIUM) 5 MG tablet          Visit Diagnoses:    ICD-10-CM ICD-9-CM   1. Benzodiazepine dependence (CMS/HCC) F13.20 304.10   2. Insomnia due to other mental disorder F51.05 300.9    F99 327.02   3.  Generalized anxiety disorder F41.1 300.02       TREATMENT PLAN/GOALS: Continue supportive psychotherapy efforts and medications as indicated. Treatment and medication options discussed during today's visit. Patient ackowledged and verbally consented to continue with current treatment plan and was educated on the importance of compliance with treatment and follow-up appointments.    MEDICATION ISSUES:     ANDREY - as reported, she was prescribed diazepam 10 mg QID,   Temazepam 30 mg   The pt tried different SSRIs wihout any response, sleep deprivation caused SZ,   The pt has multiple medical problems,   The pt needs to go to the drug rehab, she stated she could not leave her  without assistance , [facility] was suggested   The pt was unable to function without sleep ,   Cont temazapm 30 mg for now   Decrease diazepam to 5 mg TID (25 % dose reduction)        UDS today     The pt need psychotherapy , referral list was given gain, referred to providers in [place]      Discussed medication options and treatment plan of prescribed medication as well as the risks, benefits, and side effects including potential falls, possible impaired driving and metabolic adversities among others. Patient is agreeable to call the office with any worsening of symptoms or onset of side effects. Patient is agreeable to call 911 or go to the nearest ER should he/she begin having SI/HI. No medication side effects or related complaints today.     MEDS ORDERED DURING VISIT:  New Medications Ordered This Visit   Medications   • temazepam (RESTORIL) 30 MG capsule     Sig: Take 1 capsule by mouth At Night As Needed for Sleep.     Dispense:  30 capsule     Refill:  0   • diazePAM (VALIUM) 5 MG tablet     Sig: Take 1 tablet by mouth 3 (Three) Times a Day for 30 days.     Dispense:  90 tablet     Refill:  0       Return in about 2 months (around 4/21/2020).         This document has been electronically signed by Katey Soto MD  February 21, 2020  11:05 AM

## 2020-02-21 NOTE — PATIENT INSTRUCTIONS
Living With Anxiety    After being diagnosed with an anxiety disorder, you may be relieved to know why you have felt or behaved a certain way. It is natural to also feel overwhelmed about the treatment ahead and what it will mean for your life. With care and support, you can manage this condition and recover from it.  How to cope with anxiety  Dealing with stress  Stress is your body’s reaction to life changes and events, both good and bad. Stress can last just a few hours or it can be ongoing. Stress can play a major role in anxiety, so it is important to learn both how to cope with stress and how to think about it differently.  Talk with your health care provider or a counselor to learn more about stress reduction. He or she may suggest some stress reduction techniques, such as:  · Music therapy. This can include creating or listening to music that you enjoy and that inspires you.  · Mindfulness-based meditation. This involves being aware of your normal breaths, rather than trying to control your breathing. It can be done while sitting or walking.  · Centering prayer. This is a kind of meditation that involves focusing on a word, phrase, or sacred image that is meaningful to you and that brings you peace.  · Deep breathing. To do this, expand your stomach and inhale slowly through your nose. Hold your breath for 3-5 seconds. Then exhale slowly, allowing your stomach muscles to relax.  · Self-talk. This is a skill where you identify thought patterns that lead to anxiety reactions and correct those thoughts.  · Muscle relaxation. This involves tensing muscles then relaxing them.  Choose a stress reduction technique that fits your lifestyle and personality. Stress reduction techniques take time and practice. Set aside 5-15 minutes a day to do them. Therapists can offer training in these techniques. The training may be covered by some insurance plans. Other things you can do to manage stress include:  · Keeping a  stress diary. This can help you learn what triggers your stress and ways to control your response.  · Thinking about how you respond to certain situations. You may not be able to control everything, but you can control your reaction.  · Making time for activities that help you relax, and not feeling guilty about spending your time in this way.  Therapy combined with coping and stress-reduction skills provides the best chance for successful treatment.  Medicines  Medicines can help ease symptoms. Medicines for anxiety include:  · Anti-anxiety drugs.  · Antidepressants.  · Beta-blockers.  Medicines may be used as the main treatment for anxiety disorder, along with therapy, or if other treatments are not working. Medicines should be prescribed by a health care provider.  Relationships  Relationships can play a big part in helping you recover. Try to spend more time connecting with trusted friends and family members. Consider going to couples counseling, taking family education classes, or going to family therapy. Therapy can help you and others better understand the condition.  How to recognize changes in your condition  Everyone has a different response to treatment for anxiety. Recovery from anxiety happens when symptoms decrease and stop interfering with your daily activities at home or work. This may mean that you will start to:  · Have better concentration and focus.  · Sleep better.  · Be less irritable.  · Have more energy.  · Have improved memory.  It is important to recognize when your condition is getting worse. Contact your health care provider if your symptoms interfere with home or work and you do not feel like your condition is improving.  Where to find help and support:  You can get help and support from these sources:  · Self-help groups.  · Online and community organizations.  · A trusted spiritual leader.  · Couples counseling.  · Family education classes.  · Family therapy.  Follow these instructions  at home:  · Eat a healthy diet that includes plenty of vegetables, fruits, whole grains, low-fat dairy products, and lean protein. Do not eat a lot of foods that are high in solid fats, added sugars, or salt.  · Exercise. Most adults should do the following:  ? Exercise for at least 150 minutes each week. The exercise should increase your heart rate and make you sweat (moderate-intensity exercise).  ? Strengthening exercises at least twice a week.  · Cut down on caffeine, tobacco, alcohol, and other potentially harmful substances.  · Get the right amount and quality of sleep. Most adults need 7-9 hours of sleep each night.  · Make choices that simplify your life.  · Take over-the-counter and prescription medicines only as told by your health care provider.  · Avoid caffeine, alcohol, and certain over-the-counter cold medicines. These may make you feel worse. Ask your pharmacist which medicines to avoid.  · Keep all follow-up visits as told by your health care provider. This is important.  Questions to ask your health care provider  · Would I benefit from therapy?  · How often should I follow up with a health care provider?  · How long do I need to take medicine?  · Are there any long-term side effects of my medicine?  · Are there any alternatives to taking medicine?  Contact a health care provider if:  · You have a hard time staying focused or finishing daily tasks.  · You spend many hours a day feeling worried about everyday life.  · You become exhausted by worry.  · You start to have headaches, feel tense, or have nausea.  · You urinate more than normal.  · You have diarrhea.  Get help right away if:  · You have a racing heart and shortness of breath.  · You have thoughts of hurting yourself or others.  If you ever feel like you may hurt yourself or others, or have thoughts about taking your own life, get help right away. You can go to your nearest emergency department or call:  · Your local emergency services  (911 in the [place]  · A suicide crisis helpline, such as the National Suicide Prevention Lifeline at 1-312.167.2978. This is open 24-hours a day.  Summary  · Taking steps to deal with stress can help calm you.  · Medicines cannot cure anxiety disorders, but they can help ease symptoms.  · Family, friends, and partners can play a big part in helping you recover from an anxiety disorder.  This information is not intended to replace advice given to you by your health care provider. Make sure you discuss any questions you have with your health care provider.  Document Released: 12/12/2017 Document Revised: 12/12/2017 Document Reviewed: 12/12/2017  Elsevier Interactive Patient Education © 2020 [employer]

## 2020-03-11 ENCOUNTER — APPOINTMENT (OUTPATIENT)
Dept: CT IMAGING | Facility: HOSPITAL | Age: 60
End: 2020-03-11

## 2020-03-24 ENCOUNTER — HOSPITAL ENCOUNTER (OUTPATIENT)
Dept: CT IMAGING | Facility: HOSPITAL | Age: 60
End: 2020-03-24

## 2020-03-26 DIAGNOSIS — F41.1 GENERALIZED ANXIETY DISORDER: ICD-10-CM

## 2020-03-26 DIAGNOSIS — F99 INSOMNIA DUE TO OTHER MENTAL DISORDER: ICD-10-CM

## 2020-03-26 DIAGNOSIS — F51.05 INSOMNIA DUE TO OTHER MENTAL DISORDER: ICD-10-CM

## 2020-03-27 RX ORDER — DIAZEPAM 5 MG/1
5 TABLET ORAL 2 TIMES DAILY PRN
Qty: 60 TABLET | Refills: 0 | Status: SHIPPED | OUTPATIENT
Start: 2020-03-27 | End: 2020-05-01 | Stop reason: HOSPADM

## 2020-03-27 RX ORDER — TEMAZEPAM 30 MG/1
30 CAPSULE ORAL NIGHTLY PRN
Qty: 30 CAPSULE | Refills: 0 | Status: SHIPPED | OUTPATIENT
Start: 2020-03-27 | End: 2020-05-01 | Stop reason: HOSPADM

## 2020-04-20 ENCOUNTER — HOSPITAL ENCOUNTER (INPATIENT)
Facility: HOSPITAL | Age: 60
LOS: 10 days | Discharge: HOME HEALTH | End: 2020-05-01
Attending: EMERGENCY MEDICINE | Admitting: HOSPITALIST

## 2020-04-20 ENCOUNTER — APPOINTMENT (OUTPATIENT)
Dept: GENERAL RADIOLOGY | Facility: HOSPITAL | Age: 60
End: 2020-04-20

## 2020-04-20 DIAGNOSIS — G93.41 ACUTE METABOLIC ENCEPHALOPATHY: Primary | ICD-10-CM

## 2020-04-20 DIAGNOSIS — R74.01 TRANSAMINITIS: ICD-10-CM

## 2020-04-20 DIAGNOSIS — E87.20 LACTIC ACIDOSIS: ICD-10-CM

## 2020-04-20 DIAGNOSIS — R53.1 GENERALIZED WEAKNESS: ICD-10-CM

## 2020-04-20 DIAGNOSIS — J96.01 ACUTE HYPOXEMIC RESPIRATORY FAILURE (HCC): ICD-10-CM

## 2020-04-20 DIAGNOSIS — F10.929 ALCOHOLIC INTOXICATION WITH COMPLICATION (HCC): ICD-10-CM

## 2020-04-20 DIAGNOSIS — E87.6 HYPOKALEMIA: ICD-10-CM

## 2020-04-20 DIAGNOSIS — F41.1 GENERALIZED ANXIETY DISORDER: ICD-10-CM

## 2020-04-20 DIAGNOSIS — F13.10 BENZODIAZEPINE ABUSE (HCC): ICD-10-CM

## 2020-04-20 LAB
ALBUMIN SERPL-MCNC: 3.6 G/DL (ref 3.5–5.2)
ALBUMIN/GLOB SERPL: 0.9 G/DL
ALP SERPL-CCNC: 93 U/L (ref 39–117)
ALT SERPL W P-5'-P-CCNC: 80 U/L (ref 1–33)
AMMONIA BLD-SCNC: 30 UMOL/L (ref 11–51)
AMPHET+METHAMPHET UR QL: NEGATIVE
ANION GAP SERPL CALCULATED.3IONS-SCNC: 22.4 MMOL/L (ref 5–15)
ANION GAP SERPL CALCULATED.3IONS-SCNC: 24.1 MMOL/L (ref 5–15)
APAP SERPL-MCNC: <5 MCG/ML (ref 10–30)
ARTERIAL PATENCY WRIST A: POSITIVE
AST SERPL-CCNC: 190 U/L (ref 1–32)
ATMOSPHERIC PRESS: 745.6 MMHG
B PARAPERT DNA SPEC QL NAA+PROBE: NOT DETECTED
B PERT DNA SPEC QL NAA+PROBE: NOT DETECTED
BACTERIA UR QL AUTO: ABNORMAL /HPF
BARBITURATES UR QL SCN: NEGATIVE
BASE EXCESS BLDA CALC-SCNC: 0.3 MMOL/L (ref 0–2)
BASOPHILS # BLD AUTO: 0.08 10*3/MM3 (ref 0–0.2)
BASOPHILS NFR BLD AUTO: 0.9 % (ref 0–1.5)
BDY SITE: ABNORMAL
BENZODIAZ UR QL SCN: POSITIVE
BILIRUB SERPL-MCNC: 0.8 MG/DL (ref 0.2–1.2)
BILIRUB UR QL STRIP: NEGATIVE
BUN BLD-MCNC: 15 MG/DL (ref 8–23)
BUN BLD-MCNC: 18 MG/DL (ref 8–23)
BUN/CREAT SERPL: 26.5 (ref 7–25)
BUN/CREAT SERPL: 33.3 (ref 7–25)
C PNEUM DNA NPH QL NAA+NON-PROBE: NOT DETECTED
CALCIUM SPEC-SCNC: 7.5 MG/DL (ref 8.6–10.5)
CALCIUM SPEC-SCNC: 8.6 MG/DL (ref 8.6–10.5)
CANNABINOIDS SERPL QL: NEGATIVE
CHLORIDE SERPL-SCNC: 91 MMOL/L (ref 98–107)
CHLORIDE SERPL-SCNC: 96 MMOL/L (ref 98–107)
CLARITY UR: CLEAR
CO2 SERPL-SCNC: 21.6 MMOL/L (ref 22–29)
CO2 SERPL-SCNC: 24.9 MMOL/L (ref 22–29)
COCAINE UR QL: NEGATIVE
COLOR UR: ABNORMAL
CREAT BLD-MCNC: 0.45 MG/DL (ref 0.57–1)
CREAT BLD-MCNC: 0.68 MG/DL (ref 0.57–1)
D-LACTATE SERPL-SCNC: 1.7 MMOL/L (ref 0.5–2)
D-LACTATE SERPL-SCNC: 3 MMOL/L (ref 0.5–2)
DEPRECATED RDW RBC AUTO: 61.5 FL (ref 37–54)
EOSINOPHIL # BLD AUTO: 0.1 10*3/MM3 (ref 0–0.4)
EOSINOPHIL NFR BLD AUTO: 1.2 % (ref 0.3–6.2)
ERYTHROCYTE [DISTWIDTH] IN BLOOD BY AUTOMATED COUNT: 20.1 % (ref 12.3–15.4)
ETHANOL BLD-MCNC: 315 MG/DL (ref 0–10)
ETHANOL UR QL: 0.32 %
FLUAV H1 2009 PAND RNA NPH QL NAA+PROBE: NOT DETECTED
FLUAV H1 HA GENE NPH QL NAA+PROBE: NOT DETECTED
FLUAV H3 RNA NPH QL NAA+PROBE: NOT DETECTED
FLUAV SUBTYP SPEC NAA+PROBE: NOT DETECTED
FLUBV RNA ISLT QL NAA+PROBE: NOT DETECTED
GAS FLOW AIRWAY: 3 LPM
GFR SERPL CREATININE-BSD FRML MDRD: 141 ML/MIN/1.73
GFR SERPL CREATININE-BSD FRML MDRD: 88 ML/MIN/1.73
GLOBULIN UR ELPH-MCNC: 4 GM/DL
GLUCOSE BLD-MCNC: 86 MG/DL (ref 65–99)
GLUCOSE BLD-MCNC: 95 MG/DL (ref 65–99)
GLUCOSE UR STRIP-MCNC: NEGATIVE MG/DL
HADV DNA SPEC NAA+PROBE: NOT DETECTED
HCO3 BLDA-SCNC: 24.3 MMOL/L (ref 22–28)
HCOV 229E RNA SPEC QL NAA+PROBE: NOT DETECTED
HCOV HKU1 RNA SPEC QL NAA+PROBE: NOT DETECTED
HCOV NL63 RNA SPEC QL NAA+PROBE: NOT DETECTED
HCOV OC43 RNA SPEC QL NAA+PROBE: NOT DETECTED
HCT VFR BLD AUTO: 41.8 % (ref 34–46.6)
HGB BLD-MCNC: 14 G/DL (ref 12–15.9)
HGB UR QL STRIP.AUTO: ABNORMAL
HMPV RNA NPH QL NAA+NON-PROBE: NOT DETECTED
HPIV1 RNA SPEC QL NAA+PROBE: NOT DETECTED
HPIV2 RNA SPEC QL NAA+PROBE: NOT DETECTED
HPIV3 RNA NPH QL NAA+PROBE: NOT DETECTED
HPIV4 P GENE NPH QL NAA+PROBE: NOT DETECTED
HYALINE CASTS UR QL AUTO: ABNORMAL /LPF
INR PPP: 1.02 (ref 0.9–1.1)
KETONES UR QL STRIP: ABNORMAL
LACTATE HOLD SPECIMEN: NORMAL
LEUKOCYTE ESTERASE UR QL STRIP.AUTO: NEGATIVE
LYMPHOCYTES # BLD AUTO: 1.85 10*3/MM3 (ref 0.7–3.1)
LYMPHOCYTES NFR BLD AUTO: 21.4 % (ref 19.6–45.3)
M PNEUMO IGG SER IA-ACNC: NOT DETECTED
MCH RBC QN AUTO: 28.7 PG (ref 26.6–33)
MCHC RBC AUTO-ENTMCNC: 33.5 G/DL (ref 31.5–35.7)
MCV RBC AUTO: 85.8 FL (ref 79–97)
METHADONE UR QL SCN: NEGATIVE
MODALITY: ABNORMAL
MONOCYTES # BLD AUTO: 0.64 10*3/MM3 (ref 0.1–0.9)
MONOCYTES NFR BLD AUTO: 7.4 % (ref 5–12)
NEUTROPHILS # BLD AUTO: 5.94 10*3/MM3 (ref 1.7–7)
NEUTROPHILS NFR BLD AUTO: 68.5 % (ref 42.7–76)
NITRITE UR QL STRIP: NEGATIVE
NT-PROBNP SERPL-MCNC: 24.9 PG/ML (ref 5–900)
OPIATES UR QL: NEGATIVE
OXYCODONE UR QL SCN: NEGATIVE
PCO2 BLDA: 36.3 MM HG (ref 35–45)
PH BLDA: 7.43 PH UNITS (ref 7.35–7.45)
PH UR STRIP.AUTO: 6 [PH] (ref 5–8)
PLATELET # BLD AUTO: 103 10*3/MM3 (ref 140–450)
PMV BLD AUTO: 11.1 FL (ref 6–12)
PO2 BLDA: 102.8 MM HG (ref 80–100)
POTASSIUM BLD-SCNC: 2.6 MMOL/L (ref 3.5–5.2)
POTASSIUM BLD-SCNC: 3.2 MMOL/L (ref 3.5–5.2)
PROCALCITONIN SERPL-MCNC: 0.09 NG/ML (ref 0.1–0.25)
PROT SERPL-MCNC: 7.6 G/DL (ref 6–8.5)
PROT UR QL STRIP: ABNORMAL
PROTHROMBIN TIME: 13.1 SECONDS (ref 11.7–14.2)
RBC # BLD AUTO: 4.87 10*6/MM3 (ref 3.77–5.28)
RBC # UR: ABNORMAL /HPF
REF LAB TEST METHOD: ABNORMAL
RHINOVIRUS RNA SPEC NAA+PROBE: NOT DETECTED
RSV RNA NPH QL NAA+NON-PROBE: NOT DETECTED
SALICYLATES SERPL-MCNC: <0.3 MG/DL
SAO2 % BLDCOA: 98.1 % (ref 92–99)
SARS-COV-2 RNA RESP QL NAA+PROBE: NOT DETECTED
SODIUM BLD-SCNC: 140 MMOL/L (ref 136–145)
SODIUM BLD-SCNC: 140 MMOL/L (ref 136–145)
SP GR UR STRIP: 1.03 (ref 1–1.03)
SQUAMOUS #/AREA URNS HPF: ABNORMAL /HPF
TOTAL RATE: 24 BREATHS/MINUTE
TROPONIN T SERPL-MCNC: <0.01 NG/ML (ref 0–0.03)
UROBILINOGEN UR QL STRIP: ABNORMAL
WBC NRBC COR # BLD: 8.66 10*3/MM3 (ref 3.4–10.8)
WBC UR QL AUTO: ABNORMAL /HPF

## 2020-04-20 PROCEDURE — 80307 DRUG TEST PRSMV CHEM ANLYZR: CPT | Performed by: EMERGENCY MEDICINE

## 2020-04-20 PROCEDURE — P9612 CATHETERIZE FOR URINE SPEC: HCPCS

## 2020-04-20 PROCEDURE — 93005 ELECTROCARDIOGRAM TRACING: CPT | Performed by: EMERGENCY MEDICINE

## 2020-04-20 PROCEDURE — 87635 SARS-COV-2 COVID-19 AMP PRB: CPT | Performed by: EMERGENCY MEDICINE

## 2020-04-20 PROCEDURE — 99285 EMERGENCY DEPT VISIT HI MDM: CPT

## 2020-04-20 PROCEDURE — 85025 COMPLETE CBC W/AUTO DIFF WBC: CPT | Performed by: EMERGENCY MEDICINE

## 2020-04-20 PROCEDURE — 84484 ASSAY OF TROPONIN QUANT: CPT | Performed by: EMERGENCY MEDICINE

## 2020-04-20 PROCEDURE — 85610 PROTHROMBIN TIME: CPT | Performed by: EMERGENCY MEDICINE

## 2020-04-20 PROCEDURE — 87040 BLOOD CULTURE FOR BACTERIA: CPT | Performed by: EMERGENCY MEDICINE

## 2020-04-20 PROCEDURE — 83605 ASSAY OF LACTIC ACID: CPT | Performed by: EMERGENCY MEDICINE

## 2020-04-20 PROCEDURE — 25010000002 MAGNESIUM SULFATE PER 500 MG OF MAGNESIUM: Performed by: EMERGENCY MEDICINE

## 2020-04-20 PROCEDURE — 71045 X-RAY EXAM CHEST 1 VIEW: CPT

## 2020-04-20 PROCEDURE — 82140 ASSAY OF AMMONIA: CPT | Performed by: EMERGENCY MEDICINE

## 2020-04-20 PROCEDURE — 80053 COMPREHEN METABOLIC PANEL: CPT | Performed by: EMERGENCY MEDICINE

## 2020-04-20 PROCEDURE — 87147 CULTURE TYPE IMMUNOLOGIC: CPT | Performed by: EMERGENCY MEDICINE

## 2020-04-20 PROCEDURE — 25010000002 THIAMINE PER 100 MG: Performed by: EMERGENCY MEDICINE

## 2020-04-20 PROCEDURE — 36600 WITHDRAWAL OF ARTERIAL BLOOD: CPT

## 2020-04-20 PROCEDURE — 25010000003 POTASSIUM CHLORIDE 10 MEQ/100ML SOLUTION: Performed by: EMERGENCY MEDICINE

## 2020-04-20 PROCEDURE — 87150 DNA/RNA AMPLIFIED PROBE: CPT | Performed by: EMERGENCY MEDICINE

## 2020-04-20 PROCEDURE — 0100U HC BIOFIRE FILMARRAY RESP PANEL 2: CPT | Performed by: EMERGENCY MEDICINE

## 2020-04-20 PROCEDURE — 82803 BLOOD GASES ANY COMBINATION: CPT

## 2020-04-20 PROCEDURE — 81001 URINALYSIS AUTO W/SCOPE: CPT | Performed by: EMERGENCY MEDICINE

## 2020-04-20 PROCEDURE — 83880 ASSAY OF NATRIURETIC PEPTIDE: CPT | Performed by: EMERGENCY MEDICINE

## 2020-04-20 PROCEDURE — 84145 PROCALCITONIN (PCT): CPT | Performed by: EMERGENCY MEDICINE

## 2020-04-20 RX ORDER — DICLOFENAC SODIUM AND MISOPROSTOL 50; 200 MG/1; UG/1
1 TABLET, DELAYED RELEASE ORAL 2 TIMES DAILY
COMMUNITY
End: 2020-05-01 | Stop reason: HOSPADM

## 2020-04-20 RX ORDER — SODIUM CHLORIDE 0.9 % (FLUSH) 0.9 %
10 SYRINGE (ML) INJECTION AS NEEDED
Status: DISCONTINUED | OUTPATIENT
Start: 2020-04-20 | End: 2020-05-01 | Stop reason: HOSPADM

## 2020-04-20 RX ORDER — POTASSIUM CHLORIDE 7.45 MG/ML
10 INJECTION INTRAVENOUS ONCE
Status: COMPLETED | OUTPATIENT
Start: 2020-04-20 | End: 2020-04-20

## 2020-04-20 RX ORDER — LOSARTAN POTASSIUM 25 MG/1
25 TABLET ORAL DAILY
COMMUNITY
End: 2020-05-01 | Stop reason: HOSPADM

## 2020-04-20 RX ORDER — SODIUM CHLORIDE 9 MG/ML
125 INJECTION, SOLUTION INTRAVENOUS CONTINUOUS
Status: DISCONTINUED | OUTPATIENT
Start: 2020-04-20 | End: 2020-04-21

## 2020-04-20 RX ADMIN — SODIUM CHLORIDE 125 ML/HR: 9 INJECTION, SOLUTION INTRAVENOUS at 23:45

## 2020-04-20 RX ADMIN — SODIUM CHLORIDE 1000 ML: 9 INJECTION, SOLUTION INTRAVENOUS at 18:45

## 2020-04-20 RX ADMIN — FOLIC ACID 1000 ML/HR: 5 INJECTION, SOLUTION INTRAMUSCULAR; INTRAVENOUS; SUBCUTANEOUS at 21:39

## 2020-04-20 RX ADMIN — POTASSIUM CHLORIDE 10 MEQ: 7.46 INJECTION, SOLUTION INTRAVENOUS at 22:39

## 2020-04-20 RX ADMIN — POTASSIUM CHLORIDE 10 MEQ: 7.46 INJECTION, SOLUTION INTRAVENOUS at 21:40

## 2020-04-21 ENCOUNTER — APPOINTMENT (OUTPATIENT)
Dept: GENERAL RADIOLOGY | Facility: HOSPITAL | Age: 60
End: 2020-04-21

## 2020-04-21 PROBLEM — G93.41 ACUTE METABOLIC ENCEPHALOPATHY: Status: ACTIVE | Noted: 2020-04-21

## 2020-04-21 PROBLEM — E87.6 HYPOKALEMIA: Status: ACTIVE | Noted: 2020-04-21

## 2020-04-21 PROBLEM — F13.10 BENZODIAZEPINE ABUSE (HCC): Status: ACTIVE | Noted: 2020-04-21

## 2020-04-21 PROBLEM — J96.01 ACUTE HYPOXEMIC RESPIRATORY FAILURE (HCC): Status: ACTIVE | Noted: 2020-04-21

## 2020-04-21 PROBLEM — F10.929 ALCOHOLIC INTOXICATION WITH COMPLICATION (HCC): Status: ACTIVE | Noted: 2020-04-21

## 2020-04-21 PROBLEM — R74.01 TRANSAMINITIS: Status: ACTIVE | Noted: 2020-04-21

## 2020-04-21 PROBLEM — E87.20 LACTIC ACIDOSIS: Status: ACTIVE | Noted: 2020-04-21

## 2020-04-21 LAB
ANION GAP SERPL CALCULATED.3IONS-SCNC: 26.2 MMOL/L (ref 5–15)
ARTERIAL PATENCY WRIST A: POSITIVE
ARTERIAL PATENCY WRIST A: POSITIVE
ATMOSPHERIC PRESS: 747.2 MMHG
ATMOSPHERIC PRESS: 749.8 MMHG
BACTERIA BLD CULT: ABNORMAL
BASE EXCESS BLDA CALC-SCNC: -0.1 MMOL/L (ref 0–2)
BASE EXCESS BLDA CALC-SCNC: 3.6 MMOL/L (ref 0–2)
BDY SITE: ABNORMAL
BDY SITE: ABNORMAL
BUN BLD-MCNC: 12 MG/DL (ref 8–23)
BUN/CREAT SERPL: 25.5 (ref 7–25)
CALCIUM SPEC-SCNC: 7.9 MG/DL (ref 8.6–10.5)
CHLORIDE SERPL-SCNC: 91 MMOL/L (ref 98–107)
CO2 SERPL-SCNC: 17.8 MMOL/L (ref 22–29)
CREAT BLD-MCNC: 0.47 MG/DL (ref 0.57–1)
DEPRECATED RDW RBC AUTO: 58.7 FL (ref 37–54)
ERYTHROCYTE [DISTWIDTH] IN BLOOD BY AUTOMATED COUNT: 18.8 % (ref 12.3–15.4)
GAS FLOW AIRWAY: 15 LPM
GFR SERPL CREATININE-BSD FRML MDRD: 134 ML/MIN/1.73
GLUCOSE BLD-MCNC: 170 MG/DL (ref 65–99)
GLUCOSE BLDC GLUCOMTR-MCNC: 223 MG/DL (ref 70–130)
HCO3 BLDA-SCNC: 27 MMOL/L (ref 22–28)
HCO3 BLDA-SCNC: 27.6 MMOL/L (ref 22–28)
HCT VFR BLD AUTO: 32.9 % (ref 34–46.6)
HGB BLD-MCNC: 11.1 G/DL (ref 12–15.9)
HOROWITZ INDEX BLD+IHG-RTO: 50 %
MCH RBC QN AUTO: 29.4 PG (ref 26.6–33)
MCHC RBC AUTO-ENTMCNC: 33.7 G/DL (ref 31.5–35.7)
MCV RBC AUTO: 87 FL (ref 79–97)
MODALITY: ABNORMAL
MODALITY: ABNORMAL
NT-PROBNP SERPL-MCNC: 67.2 PG/ML (ref 5–900)
O2 A-A PPRESDIFF RESPIRATORY: 0.2 MMHG
PCO2 BLDA: 38.6 MM HG (ref 35–45)
PCO2 BLDA: 53 MM HG (ref 35–45)
PH BLDA: 7.32 PH UNITS (ref 7.35–7.45)
PH BLDA: 7.46 PH UNITS (ref 7.35–7.45)
PLATELET # BLD AUTO: 70 10*3/MM3 (ref 140–450)
PMV BLD AUTO: 11.6 FL (ref 6–12)
PO2 BLDA: 65.2 MM HG (ref 80–100)
PO2 BLDA: 73.2 MM HG (ref 80–100)
POTASSIUM BLD-SCNC: 2.8 MMOL/L (ref 3.5–5.2)
POTASSIUM BLD-SCNC: 3.3 MMOL/L (ref 3.5–5.2)
PROCALCITONIN SERPL-MCNC: 0.04 NG/ML (ref 0.1–0.25)
RBC # BLD AUTO: 3.78 10*6/MM3 (ref 3.77–5.28)
SAO2 % BLDCOA: 92.8 % (ref 92–99)
SAO2 % BLDCOA: 93.6 % (ref 92–99)
SET MECH RESP RATE: 18
SET MECH RESP RATE: 28
SODIUM BLD-SCNC: 135 MMOL/L (ref 136–145)
TOTAL RATE: 27 BREATHS/MINUTE
VT ON VENT VENT: 500 ML
WBC NRBC COR # BLD: 6.67 10*3/MM3 (ref 3.4–10.8)

## 2020-04-21 PROCEDURE — 97535 SELF CARE MNGMENT TRAINING: CPT

## 2020-04-21 PROCEDURE — 94799 UNLISTED PULMONARY SVC/PX: CPT

## 2020-04-21 PROCEDURE — 36415 COLL VENOUS BLD VENIPUNCTURE: CPT | Performed by: NURSE PRACTITIONER

## 2020-04-21 PROCEDURE — 82803 BLOOD GASES ANY COMBINATION: CPT

## 2020-04-21 PROCEDURE — 87040 BLOOD CULTURE FOR BACTERIA: CPT | Performed by: HOSPITALIST

## 2020-04-21 PROCEDURE — 94640 AIRWAY INHALATION TREATMENT: CPT

## 2020-04-21 PROCEDURE — 25010000002 METHYLPREDNISOLONE PER 40 MG: Performed by: INTERNAL MEDICINE

## 2020-04-21 PROCEDURE — 25010000002 METHYLPREDNISOLONE PER 125 MG: Performed by: EMERGENCY MEDICINE

## 2020-04-21 PROCEDURE — 25010000002 LORAZEPAM PER 2 MG

## 2020-04-21 PROCEDURE — 36600 WITHDRAWAL OF ARTERIAL BLOOD: CPT

## 2020-04-21 PROCEDURE — 94660 CPAP INITIATION&MGMT: CPT

## 2020-04-21 PROCEDURE — 25010000002 VANCOMYCIN 10 G RECONSTITUTED SOLUTION: Performed by: HOSPITALIST

## 2020-04-21 PROCEDURE — 90791 PSYCH DIAGNOSTIC EVALUATION: CPT

## 2020-04-21 PROCEDURE — 93010 ELECTROCARDIOGRAM REPORT: CPT | Performed by: INTERNAL MEDICINE

## 2020-04-21 PROCEDURE — 25010000002 LEVOFLOXACIN PER 250 MG: Performed by: HOSPITALIST

## 2020-04-21 PROCEDURE — 82962 GLUCOSE BLOOD TEST: CPT

## 2020-04-21 PROCEDURE — 84145 PROCALCITONIN (PCT): CPT | Performed by: INTERNAL MEDICINE

## 2020-04-21 PROCEDURE — 80048 BASIC METABOLIC PNL TOTAL CA: CPT | Performed by: NURSE PRACTITIONER

## 2020-04-21 PROCEDURE — 84132 ASSAY OF SERUM POTASSIUM: CPT | Performed by: HOSPITALIST

## 2020-04-21 PROCEDURE — 25010000002 LORAZEPAM PER 2 MG: Performed by: HOSPITALIST

## 2020-04-21 PROCEDURE — 63710000001 INSULIN LISPRO (HUMAN) PER 5 UNITS: Performed by: INTERNAL MEDICINE

## 2020-04-21 PROCEDURE — 97166 OT EVAL MOD COMPLEX 45 MIN: CPT

## 2020-04-21 PROCEDURE — 83880 ASSAY OF NATRIURETIC PEPTIDE: CPT | Performed by: INTERNAL MEDICINE

## 2020-04-21 PROCEDURE — 71045 X-RAY EXAM CHEST 1 VIEW: CPT

## 2020-04-21 PROCEDURE — 85027 COMPLETE CBC AUTOMATED: CPT | Performed by: NURSE PRACTITIONER

## 2020-04-21 RX ORDER — ALBUTEROL SULFATE 2.5 MG/3ML
2.5 SOLUTION RESPIRATORY (INHALATION)
Status: COMPLETED | OUTPATIENT
Start: 2020-04-21 | End: 2020-04-21

## 2020-04-21 RX ORDER — LORAZEPAM 2 MG/ML
1 INJECTION INTRAMUSCULAR 2 TIMES DAILY
Status: DISCONTINUED | OUTPATIENT
Start: 2020-04-21 | End: 2020-04-28

## 2020-04-21 RX ORDER — IPRATROPIUM BROMIDE AND ALBUTEROL SULFATE 2.5; .5 MG/3ML; MG/3ML
3 SOLUTION RESPIRATORY (INHALATION) EVERY 4 HOURS PRN
Status: DISCONTINUED | OUTPATIENT
Start: 2020-04-21 | End: 2020-05-01 | Stop reason: HOSPADM

## 2020-04-21 RX ORDER — SODIUM CHLORIDE 9 MG/ML
75 INJECTION, SOLUTION INTRAVENOUS CONTINUOUS
Status: DISCONTINUED | OUTPATIENT
Start: 2020-04-21 | End: 2020-04-22

## 2020-04-21 RX ORDER — BISACODYL 5 MG/1
5 TABLET, DELAYED RELEASE ORAL DAILY PRN
Status: DISCONTINUED | OUTPATIENT
Start: 2020-04-21 | End: 2020-05-01 | Stop reason: HOSPADM

## 2020-04-21 RX ORDER — LORAZEPAM 2 MG/ML
1 INJECTION INTRAMUSCULAR EVERY 4 HOURS PRN
Status: DISCONTINUED | OUTPATIENT
Start: 2020-04-21 | End: 2020-04-28

## 2020-04-21 RX ORDER — SODIUM CHLORIDE 0.9 % (FLUSH) 0.9 %
10 SYRINGE (ML) INJECTION AS NEEDED
Status: DISCONTINUED | OUTPATIENT
Start: 2020-04-21 | End: 2020-05-01 | Stop reason: HOSPADM

## 2020-04-21 RX ORDER — POTASSIUM CHLORIDE 750 MG/1
40 CAPSULE, EXTENDED RELEASE ORAL AS NEEDED
Status: DISCONTINUED | OUTPATIENT
Start: 2020-04-21 | End: 2020-04-28

## 2020-04-21 RX ORDER — DEXTROSE MONOHYDRATE 25 G/50ML
25 INJECTION, SOLUTION INTRAVENOUS
Status: DISCONTINUED | OUTPATIENT
Start: 2020-04-21 | End: 2020-04-23

## 2020-04-21 RX ORDER — DIPHENOXYLATE HYDROCHLORIDE AND ATROPINE SULFATE 2.5; .025 MG/1; MG/1
1 TABLET ORAL DAILY
Status: COMPLETED | OUTPATIENT
Start: 2020-04-22 | End: 2020-04-24

## 2020-04-21 RX ORDER — SODIUM CHLORIDE 0.9 % (FLUSH) 0.9 %
10 SYRINGE (ML) INJECTION EVERY 12 HOURS SCHEDULED
Status: DISCONTINUED | OUTPATIENT
Start: 2020-04-21 | End: 2020-05-01 | Stop reason: HOSPADM

## 2020-04-21 RX ORDER — POTASSIUM CHLORIDE 7.45 MG/ML
10 INJECTION INTRAVENOUS
Status: DISCONTINUED | OUTPATIENT
Start: 2020-04-21 | End: 2020-04-28

## 2020-04-21 RX ORDER — LEVOFLOXACIN 5 MG/ML
500 INJECTION, SOLUTION INTRAVENOUS EVERY 24 HOURS
Status: DISCONTINUED | OUTPATIENT
Start: 2020-04-21 | End: 2020-04-22

## 2020-04-21 RX ORDER — ACETAMINOPHEN 325 MG/1
650 TABLET ORAL EVERY 4 HOURS PRN
Status: DISCONTINUED | OUTPATIENT
Start: 2020-04-21 | End: 2020-05-01 | Stop reason: HOSPADM

## 2020-04-21 RX ORDER — ONDANSETRON 4 MG/1
4 TABLET, FILM COATED ORAL EVERY 6 HOURS PRN
Status: DISCONTINUED | OUTPATIENT
Start: 2020-04-21 | End: 2020-05-01 | Stop reason: HOSPADM

## 2020-04-21 RX ORDER — METHYLPREDNISOLONE SODIUM SUCCINATE 40 MG/ML
40 INJECTION, POWDER, LYOPHILIZED, FOR SOLUTION INTRAMUSCULAR; INTRAVENOUS EVERY 8 HOURS
Status: DISCONTINUED | OUTPATIENT
Start: 2020-04-21 | End: 2020-04-22

## 2020-04-21 RX ORDER — ACETAMINOPHEN 650 MG/1
650 SUPPOSITORY RECTAL EVERY 4 HOURS PRN
Status: DISCONTINUED | OUTPATIENT
Start: 2020-04-21 | End: 2020-05-01 | Stop reason: HOSPADM

## 2020-04-21 RX ORDER — ONDANSETRON 2 MG/ML
4 INJECTION INTRAMUSCULAR; INTRAVENOUS EVERY 6 HOURS PRN
Status: DISCONTINUED | OUTPATIENT
Start: 2020-04-21 | End: 2020-05-01 | Stop reason: HOSPADM

## 2020-04-21 RX ORDER — FOLIC ACID 1 MG/1
1 TABLET ORAL DAILY
Status: COMPLETED | OUTPATIENT
Start: 2020-04-22 | End: 2020-04-24

## 2020-04-21 RX ORDER — METHYLPREDNISOLONE SODIUM SUCCINATE 125 MG/2ML
125 INJECTION, POWDER, LYOPHILIZED, FOR SOLUTION INTRAMUSCULAR; INTRAVENOUS ONCE
Status: COMPLETED | OUTPATIENT
Start: 2020-04-21 | End: 2020-04-21

## 2020-04-21 RX ORDER — NICOTINE POLACRILEX 4 MG
15 LOZENGE BUCCAL
Status: DISCONTINUED | OUTPATIENT
Start: 2020-04-21 | End: 2020-04-23

## 2020-04-21 RX ORDER — IPRATROPIUM BROMIDE AND ALBUTEROL SULFATE 2.5; .5 MG/3ML; MG/3ML
3 SOLUTION RESPIRATORY (INHALATION)
Status: DISCONTINUED | OUTPATIENT
Start: 2020-04-21 | End: 2020-05-01 | Stop reason: HOSPADM

## 2020-04-21 RX ORDER — BUDESONIDE 0.5 MG/2ML
0.5 INHALANT ORAL
Status: DISCONTINUED | OUTPATIENT
Start: 2020-04-21 | End: 2020-05-01 | Stop reason: HOSPADM

## 2020-04-21 RX ORDER — LOSARTAN POTASSIUM 25 MG/1
25 TABLET ORAL DAILY
Status: DISCONTINUED | OUTPATIENT
Start: 2020-04-21 | End: 2020-04-24

## 2020-04-21 RX ORDER — CALCIUM CARBONATE 200(500)MG
2 TABLET,CHEWABLE ORAL 2 TIMES DAILY PRN
Status: DISCONTINUED | OUTPATIENT
Start: 2020-04-21 | End: 2020-05-01 | Stop reason: HOSPADM

## 2020-04-21 RX ORDER — ACETAMINOPHEN 160 MG/5ML
650 SOLUTION ORAL EVERY 4 HOURS PRN
Status: DISCONTINUED | OUTPATIENT
Start: 2020-04-21 | End: 2020-05-01 | Stop reason: HOSPADM

## 2020-04-21 RX ORDER — LORAZEPAM 2 MG/ML
INJECTION INTRAMUSCULAR
Status: COMPLETED
Start: 2020-04-21 | End: 2020-04-21

## 2020-04-21 RX ORDER — POTASSIUM CHLORIDE 1.5 G/1.77G
40 POWDER, FOR SOLUTION ORAL AS NEEDED
Status: DISCONTINUED | OUTPATIENT
Start: 2020-04-21 | End: 2020-04-28

## 2020-04-21 RX ORDER — CHLORDIAZEPOXIDE HYDROCHLORIDE 25 MG/1
25 CAPSULE, GELATIN COATED ORAL ONCE AS NEEDED
Status: COMPLETED | OUTPATIENT
Start: 2020-04-21 | End: 2020-04-21

## 2020-04-21 RX ORDER — THIAMINE MONONITRATE (VIT B1) 100 MG
100 TABLET ORAL DAILY
Status: COMPLETED | OUTPATIENT
Start: 2020-04-22 | End: 2020-04-24

## 2020-04-21 RX ADMIN — LORAZEPAM 1 MG: 2 INJECTION INTRAMUSCULAR; INTRAVENOUS at 15:18

## 2020-04-21 RX ADMIN — IPRATROPIUM BROMIDE AND ALBUTEROL SULFATE 3 ML: .5; 3 SOLUTION RESPIRATORY (INHALATION) at 15:20

## 2020-04-21 RX ADMIN — IPRATROPIUM BROMIDE AND ALBUTEROL SULFATE 3 ML: 2.5; .5 SOLUTION RESPIRATORY (INHALATION) at 19:47

## 2020-04-21 RX ADMIN — POTASSIUM CHLORIDE 40 MEQ: 10 CAPSULE, COATED, EXTENDED RELEASE ORAL at 12:09

## 2020-04-21 RX ADMIN — LEVOFLOXACIN 500 MG: 5 INJECTION, SOLUTION INTRAVENOUS at 17:56

## 2020-04-21 RX ADMIN — CHLORDIAZEPOXIDE HYDROCHLORIDE 25 MG: 25 CAPSULE ORAL at 01:31

## 2020-04-21 RX ADMIN — BUDESONIDE 0.5 MG: 0.5 INHALANT RESPIRATORY (INHALATION) at 19:47

## 2020-04-21 RX ADMIN — LORAZEPAM 1 MG: 2 INJECTION INTRAMUSCULAR; INTRAVENOUS at 18:53

## 2020-04-21 RX ADMIN — VANCOMYCIN HYDROCHLORIDE 1750 MG: 10 INJECTION, POWDER, LYOPHILIZED, FOR SOLUTION INTRAVENOUS at 20:01

## 2020-04-21 RX ADMIN — LORAZEPAM 2 MG: 2 INJECTION INTRAMUSCULAR; INTRAVENOUS at 15:59

## 2020-04-21 RX ADMIN — METHYLPREDNISOLONE SODIUM SUCCINATE 40 MG: 40 INJECTION, POWDER, FOR SOLUTION INTRAMUSCULAR; INTRAVENOUS at 17:57

## 2020-04-21 RX ADMIN — ALBUTEROL SULFATE 2.5 MG: 2.5 SOLUTION RESPIRATORY (INHALATION) at 02:58

## 2020-04-21 RX ADMIN — ALBUTEROL SULFATE 2.5 MG: 2.5 SOLUTION RESPIRATORY (INHALATION) at 03:17

## 2020-04-21 RX ADMIN — SODIUM CHLORIDE, PRESERVATIVE FREE 10 ML: 5 INJECTION INTRAVENOUS at 12:09

## 2020-04-21 RX ADMIN — LORAZEPAM 1 MG: 2 INJECTION INTRAMUSCULAR; INTRAVENOUS at 07:30

## 2020-04-21 RX ADMIN — LORAZEPAM 1 MG: 2 INJECTION INTRAMUSCULAR; INTRAVENOUS at 20:23

## 2020-04-21 RX ADMIN — LORAZEPAM 1 MG: 2 INJECTION INTRAMUSCULAR; INTRAVENOUS at 12:08

## 2020-04-21 RX ADMIN — METOPROLOL TARTRATE 25 MG: 25 TABLET ORAL at 20:23

## 2020-04-21 RX ADMIN — SODIUM CHLORIDE 75 ML/HR: 9 INJECTION, SOLUTION INTRAVENOUS at 12:09

## 2020-04-21 RX ADMIN — INSULIN LISPRO 3 UNITS: 100 INJECTION, SOLUTION INTRAVENOUS; SUBCUTANEOUS at 17:56

## 2020-04-21 RX ADMIN — POTASSIUM CHLORIDE 40 MEQ: 10 CAPSULE, COATED, EXTENDED RELEASE ORAL at 20:23

## 2020-04-21 RX ADMIN — METHYLPREDNISOLONE SODIUM SUCCINATE 125 MG: 125 INJECTION, POWDER, FOR SOLUTION INTRAMUSCULAR; INTRAVENOUS at 02:40

## 2020-04-22 ENCOUNTER — APPOINTMENT (OUTPATIENT)
Dept: GENERAL RADIOLOGY | Facility: HOSPITAL | Age: 60
End: 2020-04-22

## 2020-04-22 ENCOUNTER — APPOINTMENT (OUTPATIENT)
Dept: CARDIOLOGY | Facility: HOSPITAL | Age: 60
End: 2020-04-22

## 2020-04-22 PROBLEM — I51.7 CONCENTRIC LEFT VENTRICULAR HYPERTROPHY: Status: ACTIVE | Noted: 2020-04-22

## 2020-04-22 PROBLEM — I51.89 RIGHT VENTRICULAR SYSTOLIC DYSFUNCTION: Status: ACTIVE | Noted: 2020-04-22

## 2020-04-22 PROBLEM — R93.1 ABNORMAL ECHOCARDIOGRAM: Status: ACTIVE | Noted: 2020-04-22

## 2020-04-22 PROBLEM — R94.30 CARDIAC LV EJECTION FRACTION OF 35-39%: Status: ACTIVE | Noted: 2020-04-22

## 2020-04-22 PROBLEM — I51.81 STRESS-INDUCED CARDIOMYOPATHY: Status: ACTIVE | Noted: 2020-04-22

## 2020-04-22 PROBLEM — I07.1 MILD TRICUSPID REGURGITATION: Status: ACTIVE | Noted: 2020-04-22

## 2020-04-22 PROBLEM — J18.9 PNA (PNEUMONIA): Status: ACTIVE | Noted: 2020-04-22

## 2020-04-22 LAB
ALBUMIN SERPL-MCNC: 3.4 G/DL (ref 3.5–5.2)
ALBUMIN/GLOB SERPL: 0.8 G/DL
ALP SERPL-CCNC: 94 U/L (ref 39–117)
ALT SERPL W P-5'-P-CCNC: 96 U/L (ref 1–33)
ANION GAP SERPL CALCULATED.3IONS-SCNC: 17.6 MMOL/L (ref 5–15)
AORTIC DIMENSIONLESS INDEX: 0.8 (DI)
ARTERIAL PATENCY WRIST A: POSITIVE
AST SERPL-CCNC: 200 U/L (ref 1–32)
ATMOSPHERIC PRESS: 751.2 MMHG
BACTERIA SPEC AEROBE CULT: ABNORMAL
BASE EXCESS BLDA CALC-SCNC: -0.2 MMOL/L (ref 0–2)
BASOPHILS # BLD AUTO: 0.01 10*3/MM3 (ref 0–0.2)
BASOPHILS NFR BLD AUTO: 0.1 % (ref 0–1.5)
BDY SITE: ABNORMAL
BH CV ECHO MEAS - AO MAX PG (FULL): 2.2 MMHG
BH CV ECHO MEAS - AO MAX PG: 5.4 MMHG
BH CV ECHO MEAS - AO MEAN PG (FULL): 1 MMHG
BH CV ECHO MEAS - AO MEAN PG: 3 MMHG
BH CV ECHO MEAS - AO V2 MAX: 116 CM/SEC
BH CV ECHO MEAS - AO V2 MEAN: 75.3 CM/SEC
BH CV ECHO MEAS - AO V2 VTI: 19.7 CM
BH CV ECHO MEAS - AVA(I,A): 2.2 CM^2
BH CV ECHO MEAS - AVA(I,D): 2.2 CM^2
BH CV ECHO MEAS - AVA(V,A): 2.2 CM^2
BH CV ECHO MEAS - AVA(V,D): 2.2 CM^2
BH CV ECHO MEAS - BSA(HAYCOCK): 2 M^2
BH CV ECHO MEAS - BSA: 2 M^2
BH CV ECHO MEAS - BZI_BMI: 28.4 KILOGRAMS/M^2
BH CV ECHO MEAS - BZI_METRIC_HEIGHT: 172.7 CM
BH CV ECHO MEAS - BZI_METRIC_WEIGHT: 84.8 KG
BH CV ECHO MEAS - EDV(CUBED): 103.8 ML
BH CV ECHO MEAS - EDV(MOD-SP2): 66 ML
BH CV ECHO MEAS - EDV(MOD-SP4): 61 ML
BH CV ECHO MEAS - EDV(TEICH): 102.4 ML
BH CV ECHO MEAS - EF(CUBED): 55.1 %
BH CV ECHO MEAS - EF(MOD-BP): 39 %
BH CV ECHO MEAS - EF(MOD-SP2): 33.3 %
BH CV ECHO MEAS - EF(MOD-SP4): 39.3 %
BH CV ECHO MEAS - EF(TEICH): 46.8 %
BH CV ECHO MEAS - ESV(CUBED): 46.7 ML
BH CV ECHO MEAS - ESV(MOD-SP2): 44 ML
BH CV ECHO MEAS - ESV(MOD-SP4): 37 ML
BH CV ECHO MEAS - ESV(TEICH): 54.4 ML
BH CV ECHO MEAS - FS: 23.4 %
BH CV ECHO MEAS - IVS/LVPW: 1.2
BH CV ECHO MEAS - IVSD: 1.2 CM
BH CV ECHO MEAS - LAT PEAK E' VEL: 7.2 CM/SEC
BH CV ECHO MEAS - LV DIASTOLIC VOL/BSA (35-75): 30.7 ML/M^2
BH CV ECHO MEAS - LV MASS(C)D: 187.5 GRAMS
BH CV ECHO MEAS - LV MASS(C)DI: 94.4 GRAMS/M^2
BH CV ECHO MEAS - LV MAX PG: 3.2 MMHG
BH CV ECHO MEAS - LV MEAN PG: 2 MMHG
BH CV ECHO MEAS - LV SYSTOLIC VOL/BSA (12-30): 18.6 ML/M^2
BH CV ECHO MEAS - LV V1 MAX: 89.4 CM/SEC
BH CV ECHO MEAS - LV V1 MEAN: 60.9 CM/SEC
BH CV ECHO MEAS - LV V1 VTI: 15.2 CM
BH CV ECHO MEAS - LVIDD: 4.7 CM
BH CV ECHO MEAS - LVIDS: 3.6 CM
BH CV ECHO MEAS - LVLD AP2: 8.1 CM
BH CV ECHO MEAS - LVLD AP4: 7.4 CM
BH CV ECHO MEAS - LVLS AP2: 7.3 CM
BH CV ECHO MEAS - LVLS AP4: 6.9 CM
BH CV ECHO MEAS - LVOT AREA (M): 2.8 CM^2
BH CV ECHO MEAS - LVOT AREA: 2.8 CM^2
BH CV ECHO MEAS - LVOT DIAM: 1.9 CM
BH CV ECHO MEAS - LVPWD: 1 CM
BH CV ECHO MEAS - MED PEAK E' VEL: 5.1 CM/SEC
BH CV ECHO MEAS - MV A MAX VEL: 69.8 CM/SEC
BH CV ECHO MEAS - MV DEC SLOPE: 390.5 CM/SEC^2
BH CV ECHO MEAS - MV DEC TIME: 217 SEC
BH CV ECHO MEAS - MV E MAX VEL: 60.1 CM/SEC
BH CV ECHO MEAS - MV E/A: 0.86
BH CV ECHO MEAS - MV MAX PG: 1.7 MMHG
BH CV ECHO MEAS - MV MEAN PG: 1 MMHG
BH CV ECHO MEAS - MV P1/2T MAX VEL: 76 CM/SEC
BH CV ECHO MEAS - MV P1/2T: 57 MSEC
BH CV ECHO MEAS - MV V2 MAX: 64.7 CM/SEC
BH CV ECHO MEAS - MV V2 MEAN: 40.5 CM/SEC
BH CV ECHO MEAS - MV V2 VTI: 13.4 CM
BH CV ECHO MEAS - MVA P1/2T LCG: 2.9 CM^2
BH CV ECHO MEAS - MVA(P1/2T): 3.9 CM^2
BH CV ECHO MEAS - MVA(VTI): 3.2 CM^2
BH CV ECHO MEAS - PULM A REVS DUR: 0.11 SEC
BH CV ECHO MEAS - PULM A REVS VEL: 38.3 CM/SEC
BH CV ECHO MEAS - PULM DIAS VEL: 41.2 CM/SEC
BH CV ECHO MEAS - PULM S/D: 1.2
BH CV ECHO MEAS - PULM SYS VEL: 51.4 CM/SEC
BH CV ECHO MEAS - RAP SYSTOLE: 8 MMHG
BH CV ECHO MEAS - RV MAX PG: 1.6 MMHG
BH CV ECHO MEAS - RV MEAN PG: 1 MMHG
BH CV ECHO MEAS - RV V1 MAX: 63.5 CM/SEC
BH CV ECHO MEAS - RV V1 MEAN: 35.4 CM/SEC
BH CV ECHO MEAS - RV V1 VTI: 9.1 CM
BH CV ECHO MEAS - RVSP: 37 MMHG
BH CV ECHO MEAS - SI(CUBED): 28.8 ML/M^2
BH CV ECHO MEAS - SI(LVOT): 21.7 ML/M^2
BH CV ECHO MEAS - SI(MOD-SP2): 11.1 ML/M^2
BH CV ECHO MEAS - SI(MOD-SP4): 12.1 ML/M^2
BH CV ECHO MEAS - SI(TEICH): 24.1 ML/M^2
BH CV ECHO MEAS - SV(CUBED): 57.2 ML
BH CV ECHO MEAS - SV(LVOT): 43.1 ML
BH CV ECHO MEAS - SV(MOD-SP2): 22 ML
BH CV ECHO MEAS - SV(MOD-SP4): 24 ML
BH CV ECHO MEAS - SV(TEICH): 47.9 ML
BH CV ECHO MEAS - TR MAX PG: 29 MMHG
BH CV ECHO MEAS - TR MAX VEL: 270 CM/SEC
BH CV ECHO MEASUREMENTS AVERAGE E/E' RATIO: 9.77
BH CV XLRA - RV BASE: 3.6 CM
BH CV XLRA - TDI S': 8.03 CM/SEC
BILIRUB SERPL-MCNC: 1.1 MG/DL (ref 0.2–1.2)
BUN BLD-MCNC: 15 MG/DL (ref 8–23)
BUN/CREAT SERPL: 28.8 (ref 7–25)
CALCIUM SPEC-SCNC: 8.5 MG/DL (ref 8.6–10.5)
CHLORIDE SERPL-SCNC: 98 MMOL/L (ref 98–107)
CO2 SERPL-SCNC: 22.4 MMOL/L (ref 22–29)
CREAT BLD-MCNC: 0.52 MG/DL (ref 0.57–1)
DEPRECATED RDW RBC AUTO: 61 FL (ref 37–54)
EOSINOPHIL # BLD AUTO: 0 10*3/MM3 (ref 0–0.4)
EOSINOPHIL NFR BLD AUTO: 0 % (ref 0.3–6.2)
ERYTHROCYTE [DISTWIDTH] IN BLOOD BY AUTOMATED COUNT: 18.5 % (ref 12.3–15.4)
GFR SERPL CREATININE-BSD FRML MDRD: 119 ML/MIN/1.73
GLOBULIN UR ELPH-MCNC: 4.1 GM/DL
GLUCOSE BLD-MCNC: 169 MG/DL (ref 65–99)
GLUCOSE BLDC GLUCOMTR-MCNC: 133 MG/DL (ref 70–130)
GLUCOSE BLDC GLUCOMTR-MCNC: 159 MG/DL (ref 70–130)
GLUCOSE BLDC GLUCOMTR-MCNC: 215 MG/DL (ref 70–130)
GLUCOSE BLDC GLUCOMTR-MCNC: 225 MG/DL (ref 70–130)
GLUCOSE BLDC GLUCOMTR-MCNC: 230 MG/DL (ref 70–130)
GRAM STN SPEC: ABNORMAL
HCO3 BLDA-SCNC: 24.1 MMOL/L (ref 22–28)
HCT VFR BLD AUTO: 34.3 % (ref 34–46.6)
HGB BLD-MCNC: 11.1 G/DL (ref 12–15.9)
HOROWITZ INDEX BLD+IHG-RTO: 80 %
IMM GRANULOCYTES # BLD AUTO: 0.05 10*3/MM3 (ref 0–0.05)
IMM GRANULOCYTES NFR BLD AUTO: 0.7 % (ref 0–0.5)
ISOLATED FROM: ABNORMAL
LEFT ATRIUM VOLUME INDEX: 20.5 ML/M2
LYMPHOCYTES # BLD AUTO: 0.38 10*3/MM3 (ref 0.7–3.1)
LYMPHOCYTES NFR BLD AUTO: 5.7 % (ref 19.6–45.3)
MAXIMAL PREDICTED HEART RATE: 158 BPM
MCH RBC QN AUTO: 29.1 PG (ref 26.6–33)
MCHC RBC AUTO-ENTMCNC: 32.4 G/DL (ref 31.5–35.7)
MCV RBC AUTO: 89.8 FL (ref 79–97)
MODALITY: ABNORMAL
MONOCYTES # BLD AUTO: 0.28 10*3/MM3 (ref 0.1–0.9)
MONOCYTES NFR BLD AUTO: 4.2 % (ref 5–12)
NEUTROPHILS # BLD AUTO: 6 10*3/MM3 (ref 1.7–7)
NEUTROPHILS NFR BLD AUTO: 89.3 % (ref 42.7–76)
NRBC BLD AUTO-RTO: 0 /100 WBC (ref 0–0.2)
O2 A-A PPRESDIFF RESPIRATORY: 0.1 MMHG
PCO2 BLDA: 37.5 MM HG (ref 35–45)
PEEP RESPIRATORY: 8 CM[H2O]
PH BLDA: 7.42 PH UNITS (ref 7.35–7.45)
PLATELET # BLD AUTO: 98 10*3/MM3 (ref 140–450)
PMV BLD AUTO: 11.8 FL (ref 6–12)
PO2 BLDA: 68.6 MM HG (ref 80–100)
POTASSIUM BLD-SCNC: 3.9 MMOL/L (ref 3.5–5.2)
PROT SERPL-MCNC: 7.5 G/DL (ref 6–8.5)
RBC # BLD AUTO: 3.82 10*6/MM3 (ref 3.77–5.28)
SAO2 % BLDCOA: 93.8 % (ref 92–99)
SET MECH RESP RATE: 18
SODIUM BLD-SCNC: 138 MMOL/L (ref 136–145)
STRESS TARGET HR: 134 BPM
TOTAL RATE: 23 BREATHS/MINUTE
VENTILATOR MODE: ABNORMAL
VT ON VENT VENT: 0.5 ML
WBC NRBC COR # BLD: 6.72 10*3/MM3 (ref 3.4–10.8)

## 2020-04-22 PROCEDURE — 80053 COMPREHEN METABOLIC PANEL: CPT | Performed by: HOSPITALIST

## 2020-04-22 PROCEDURE — 82962 GLUCOSE BLOOD TEST: CPT

## 2020-04-22 PROCEDURE — 25010000002 METHYLPREDNISOLONE PER 125 MG: Performed by: INTERNAL MEDICINE

## 2020-04-22 PROCEDURE — 25010000002 ONDANSETRON PER 1 MG: Performed by: NURSE PRACTITIONER

## 2020-04-22 PROCEDURE — 71045 X-RAY EXAM CHEST 1 VIEW: CPT

## 2020-04-22 PROCEDURE — 94799 UNLISTED PULMONARY SVC/PX: CPT

## 2020-04-22 PROCEDURE — 82803 BLOOD GASES ANY COMBINATION: CPT

## 2020-04-22 PROCEDURE — 25010000002 LORAZEPAM PER 2 MG: Performed by: HOSPITALIST

## 2020-04-22 PROCEDURE — 25010000002 LEVOFLOXACIN PER 250 MG: Performed by: INTERNAL MEDICINE

## 2020-04-22 PROCEDURE — 25010000002 FUROSEMIDE PER 20 MG: Performed by: INTERNAL MEDICINE

## 2020-04-22 PROCEDURE — 36600 WITHDRAWAL OF ARTERIAL BLOOD: CPT

## 2020-04-22 PROCEDURE — 94660 CPAP INITIATION&MGMT: CPT

## 2020-04-22 PROCEDURE — 63710000001 INSULIN GLARGINE PER 5 UNITS: Performed by: INTERNAL MEDICINE

## 2020-04-22 PROCEDURE — 25010000002 LORAZEPAM PER 2 MG: Performed by: INTERNAL MEDICINE

## 2020-04-22 PROCEDURE — 93306 TTE W/DOPPLER COMPLETE: CPT

## 2020-04-22 PROCEDURE — 25010000002 METHYLPREDNISOLONE PER 125 MG

## 2020-04-22 PROCEDURE — 85025 COMPLETE CBC W/AUTO DIFF WBC: CPT | Performed by: HOSPITALIST

## 2020-04-22 PROCEDURE — 93306 TTE W/DOPPLER COMPLETE: CPT | Performed by: INTERNAL MEDICINE

## 2020-04-22 PROCEDURE — 25010000002 METHYLPREDNISOLONE PER 40 MG: Performed by: INTERNAL MEDICINE

## 2020-04-22 PROCEDURE — 25010000002 VANCOMYCIN 10 G RECONSTITUTED SOLUTION: Performed by: HOSPITALIST

## 2020-04-22 RX ORDER — INSULIN GLARGINE 100 [IU]/ML
10 INJECTION, SOLUTION SUBCUTANEOUS DAILY
Status: DISCONTINUED | OUTPATIENT
Start: 2020-04-22 | End: 2020-04-26

## 2020-04-22 RX ORDER — LORAZEPAM 2 MG/ML
2 INJECTION INTRAMUSCULAR
Status: DISCONTINUED | OUTPATIENT
Start: 2020-04-22 | End: 2020-04-28

## 2020-04-22 RX ORDER — LORAZEPAM 2 MG/ML
1 INJECTION INTRAMUSCULAR
Status: DISCONTINUED | OUTPATIENT
Start: 2020-04-22 | End: 2020-04-28

## 2020-04-22 RX ORDER — LORAZEPAM 1 MG/1
1 TABLET ORAL
Status: DISCONTINUED | OUTPATIENT
Start: 2020-04-22 | End: 2020-04-28

## 2020-04-22 RX ORDER — CHLORDIAZEPOXIDE HYDROCHLORIDE 25 MG/1
25 CAPSULE, GELATIN COATED ORAL EVERY 8 HOURS SCHEDULED
Status: DISCONTINUED | OUTPATIENT
Start: 2020-04-22 | End: 2020-04-28

## 2020-04-22 RX ORDER — LORAZEPAM 1 MG/1
4 TABLET ORAL
Status: DISCONTINUED | OUTPATIENT
Start: 2020-04-22 | End: 2020-04-28

## 2020-04-22 RX ORDER — LORAZEPAM 2 MG/ML
4 INJECTION INTRAMUSCULAR
Status: DISCONTINUED | OUTPATIENT
Start: 2020-04-22 | End: 2020-04-28

## 2020-04-22 RX ORDER — DEXMEDETOMIDINE HYDROCHLORIDE 4 UG/ML
.2-1.5 INJECTION INTRAVENOUS
Status: DISCONTINUED | OUTPATIENT
Start: 2020-04-22 | End: 2020-04-24

## 2020-04-22 RX ORDER — QUETIAPINE FUMARATE 50 MG/1
50 TABLET, FILM COATED ORAL NIGHTLY
Status: DISCONTINUED | OUTPATIENT
Start: 2020-04-22 | End: 2020-04-26

## 2020-04-22 RX ORDER — METHYLPREDNISOLONE SODIUM SUCCINATE 125 MG/2ML
INJECTION, POWDER, LYOPHILIZED, FOR SOLUTION INTRAMUSCULAR; INTRAVENOUS
Status: COMPLETED
Start: 2020-04-22 | End: 2020-04-22

## 2020-04-22 RX ORDER — FUROSEMIDE 10 MG/ML
40 INJECTION INTRAMUSCULAR; INTRAVENOUS ONCE
Status: COMPLETED | OUTPATIENT
Start: 2020-04-22 | End: 2020-04-22

## 2020-04-22 RX ORDER — METHYLPREDNISOLONE SODIUM SUCCINATE 125 MG/2ML
125 INJECTION, POWDER, LYOPHILIZED, FOR SOLUTION INTRAMUSCULAR; INTRAVENOUS EVERY 8 HOURS
Status: DISCONTINUED | OUTPATIENT
Start: 2020-04-22 | End: 2020-04-23

## 2020-04-22 RX ORDER — LEVOFLOXACIN 5 MG/ML
750 INJECTION, SOLUTION INTRAVENOUS EVERY 24 HOURS
Status: COMPLETED | OUTPATIENT
Start: 2020-04-22 | End: 2020-04-25

## 2020-04-22 RX ORDER — LORAZEPAM 1 MG/1
2 TABLET ORAL
Status: DISCONTINUED | OUTPATIENT
Start: 2020-04-22 | End: 2020-04-28

## 2020-04-22 RX ADMIN — SODIUM CHLORIDE, PRESERVATIVE FREE 10 ML: 5 INJECTION INTRAVENOUS at 23:58

## 2020-04-22 RX ADMIN — LOSARTAN POTASSIUM 25 MG: 25 TABLET, FILM COATED ORAL at 12:08

## 2020-04-22 RX ADMIN — IPRATROPIUM BROMIDE AND ALBUTEROL SULFATE 3 ML: 2.5; .5 SOLUTION RESPIRATORY (INHALATION) at 16:21

## 2020-04-22 RX ADMIN — BUDESONIDE 0.5 MG: 0.5 INHALANT RESPIRATORY (INHALATION) at 07:38

## 2020-04-22 RX ADMIN — LORAZEPAM 1 MG: 2 INJECTION INTRAMUSCULAR; INTRAVENOUS at 23:58

## 2020-04-22 RX ADMIN — LORAZEPAM 2 MG: 2 INJECTION INTRAMUSCULAR; INTRAVENOUS at 15:08

## 2020-04-22 RX ADMIN — ONDANSETRON 4 MG: 2 INJECTION INTRAMUSCULAR; INTRAVENOUS at 06:14

## 2020-04-22 RX ADMIN — DEXMEDETOMIDINE HYDROCHLORIDE 0.5 MCG/KG/HR: 4 INJECTION INTRAVENOUS at 21:21

## 2020-04-22 RX ADMIN — METHYLPREDNISOLONE SODIUM SUCCINATE 40 MG: 40 INJECTION, POWDER, FOR SOLUTION INTRAMUSCULAR; INTRAVENOUS at 02:52

## 2020-04-22 RX ADMIN — LORAZEPAM 1 MG: 2 INJECTION INTRAMUSCULAR; INTRAVENOUS at 11:35

## 2020-04-22 RX ADMIN — LEVOFLOXACIN 750 MG: 5 INJECTION, SOLUTION INTRAVENOUS at 15:55

## 2020-04-22 RX ADMIN — Medication 100 MG: at 12:08

## 2020-04-22 RX ADMIN — DEXMEDETOMIDINE HYDROCHLORIDE 0.2 MCG/KG/HR: 4 INJECTION INTRAVENOUS at 14:29

## 2020-04-22 RX ADMIN — DEXMEDETOMIDINE HYDROCHLORIDE 0.5 MCG/KG/HR: 4 INJECTION INTRAVENOUS at 17:00

## 2020-04-22 RX ADMIN — VANCOMYCIN HYDROCHLORIDE 1250 MG: 10 INJECTION, POWDER, LYOPHILIZED, FOR SOLUTION INTRAVENOUS at 11:33

## 2020-04-22 RX ADMIN — FOLIC ACID 1 MG: 1 TABLET ORAL at 12:08

## 2020-04-22 RX ADMIN — METOPROLOL TARTRATE 25 MG: 25 TABLET ORAL at 20:08

## 2020-04-22 RX ADMIN — Medication 1 TABLET: at 12:08

## 2020-04-22 RX ADMIN — BUDESONIDE 0.5 MG: 0.5 INHALANT RESPIRATORY (INHALATION) at 20:42

## 2020-04-22 RX ADMIN — LORAZEPAM 1 MG: 2 INJECTION INTRAMUSCULAR; INTRAVENOUS at 14:38

## 2020-04-22 RX ADMIN — IPRATROPIUM BROMIDE AND ALBUTEROL SULFATE 3 ML: 2.5; .5 SOLUTION RESPIRATORY (INHALATION) at 11:01

## 2020-04-22 RX ADMIN — SODIUM CHLORIDE, PRESERVATIVE FREE 10 ML: 5 INJECTION INTRAVENOUS at 11:33

## 2020-04-22 RX ADMIN — LORAZEPAM 1 MG: 2 INJECTION INTRAMUSCULAR; INTRAVENOUS at 20:29

## 2020-04-22 RX ADMIN — METOPROLOL TARTRATE 25 MG: 25 TABLET ORAL at 12:08

## 2020-04-22 RX ADMIN — IPRATROPIUM BROMIDE AND ALBUTEROL SULFATE 3 ML: 2.5; .5 SOLUTION RESPIRATORY (INHALATION) at 20:42

## 2020-04-22 RX ADMIN — FUROSEMIDE 40 MG: 10 INJECTION, SOLUTION INTRAMUSCULAR; INTRAVENOUS at 17:00

## 2020-04-22 RX ADMIN — CHLORDIAZEPOXIDE HYDROCHLORIDE 25 MG: 25 CAPSULE ORAL at 21:21

## 2020-04-22 RX ADMIN — IPRATROPIUM BROMIDE AND ALBUTEROL SULFATE 3 ML: 2.5; .5 SOLUTION RESPIRATORY (INHALATION) at 07:37

## 2020-04-22 RX ADMIN — METHYLPREDNISOLONE SODIUM SUCCINATE 125 MG: 125 INJECTION, POWDER, FOR SOLUTION INTRAMUSCULAR; INTRAVENOUS at 11:34

## 2020-04-22 RX ADMIN — METHYLPREDNISOLONE SODIUM SUCCINATE 125 MG: 125 INJECTION, POWDER, FOR SOLUTION INTRAMUSCULAR; INTRAVENOUS at 17:05

## 2020-04-22 RX ADMIN — LORAZEPAM 1 MG: 2 INJECTION INTRAMUSCULAR; INTRAVENOUS at 03:30

## 2020-04-22 RX ADMIN — INSULIN GLARGINE 10 UNITS: 100 INJECTION, SOLUTION SUBCUTANEOUS at 12:07

## 2020-04-22 RX ADMIN — LORAZEPAM 2 MG: 2 INJECTION INTRAMUSCULAR; INTRAVENOUS at 14:53

## 2020-04-23 ENCOUNTER — APPOINTMENT (OUTPATIENT)
Dept: GENERAL RADIOLOGY | Facility: HOSPITAL | Age: 60
End: 2020-04-23

## 2020-04-23 LAB
ALBUMIN SERPL-MCNC: 3.1 G/DL (ref 3.5–5.2)
ALBUMIN/GLOB SERPL: 0.8 G/DL
ALP SERPL-CCNC: 81 U/L (ref 39–117)
ALT SERPL W P-5'-P-CCNC: 73 U/L (ref 1–33)
ANION GAP SERPL CALCULATED.3IONS-SCNC: 16.1 MMOL/L (ref 5–15)
ANION GAP SERPL CALCULATED.3IONS-SCNC: 17.3 MMOL/L (ref 5–15)
ARTERIAL PATENCY WRIST A: POSITIVE
AST SERPL-CCNC: 106 U/L (ref 1–32)
ATMOSPHERIC PRESS: 743.4 MMHG
BASE EXCESS BLDA CALC-SCNC: 4.3 MMOL/L (ref 0–2)
BASOPHILS # BLD AUTO: 0.01 10*3/MM3 (ref 0–0.2)
BASOPHILS NFR BLD AUTO: 0.2 % (ref 0–1.5)
BDY SITE: ABNORMAL
BILIRUB SERPL-MCNC: 0.8 MG/DL (ref 0.2–1.2)
BUN BLD-MCNC: 21 MG/DL (ref 8–23)
BUN BLD-MCNC: 21 MG/DL (ref 8–23)
BUN/CREAT SERPL: 34.4 (ref 7–25)
BUN/CREAT SERPL: 36.8 (ref 7–25)
CALCIUM SPEC-SCNC: 8.2 MG/DL (ref 8.6–10.5)
CALCIUM SPEC-SCNC: 8.3 MG/DL (ref 8.6–10.5)
CHLORIDE SERPL-SCNC: 96 MMOL/L (ref 98–107)
CHLORIDE SERPL-SCNC: 97 MMOL/L (ref 98–107)
CO2 SERPL-SCNC: 24.7 MMOL/L (ref 22–29)
CO2 SERPL-SCNC: 25.9 MMOL/L (ref 22–29)
CREAT BLD-MCNC: 0.57 MG/DL (ref 0.57–1)
CREAT BLD-MCNC: 0.61 MG/DL (ref 0.57–1)
DEPRECATED RDW RBC AUTO: 57.2 FL (ref 37–54)
EOSINOPHIL # BLD AUTO: 0 10*3/MM3 (ref 0–0.4)
EOSINOPHIL NFR BLD AUTO: 0 % (ref 0.3–6.2)
ERYTHROCYTE [DISTWIDTH] IN BLOOD BY AUTOMATED COUNT: 17.9 % (ref 12.3–15.4)
FERRITIN SERPL-MCNC: 536 NG/ML (ref 13–150)
GAS FLOW AIRWAY: 6 LPM
GFR SERPL CREATININE-BSD FRML MDRD: 107 ML/MIN/1.73
GFR SERPL CREATININE-BSD FRML MDRD: 99 ML/MIN/1.73
GLOBULIN UR ELPH-MCNC: 3.7 GM/DL
GLUCOSE BLD-MCNC: 218 MG/DL (ref 65–99)
GLUCOSE BLD-MCNC: 225 MG/DL (ref 65–99)
GLUCOSE BLDC GLUCOMTR-MCNC: 199 MG/DL (ref 70–130)
GLUCOSE BLDC GLUCOMTR-MCNC: 225 MG/DL (ref 70–130)
GLUCOSE BLDC GLUCOMTR-MCNC: 237 MG/DL (ref 70–130)
HCO3 BLDA-SCNC: 27.2 MMOL/L (ref 22–28)
HCT VFR BLD AUTO: 30.6 % (ref 34–46.6)
HGB BLD-MCNC: 10 G/DL (ref 12–15.9)
IMM GRANULOCYTES # BLD AUTO: 0.03 10*3/MM3 (ref 0–0.05)
IMM GRANULOCYTES NFR BLD AUTO: 0.7 % (ref 0–0.5)
LYMPHOCYTES # BLD AUTO: 0.39 10*3/MM3 (ref 0.7–3.1)
LYMPHOCYTES NFR BLD AUTO: 8.6 % (ref 19.6–45.3)
MAGNESIUM SERPL-MCNC: 1.3 MG/DL (ref 1.6–2.4)
MCH RBC QN AUTO: 28.9 PG (ref 26.6–33)
MCHC RBC AUTO-ENTMCNC: 32.7 G/DL (ref 31.5–35.7)
MCV RBC AUTO: 88.4 FL (ref 79–97)
MODALITY: ABNORMAL
MONOCYTES # BLD AUTO: 0.28 10*3/MM3 (ref 0.1–0.9)
MONOCYTES NFR BLD AUTO: 6.2 % (ref 5–12)
NEUTROPHILS # BLD AUTO: 3.82 10*3/MM3 (ref 1.7–7)
NEUTROPHILS NFR BLD AUTO: 84.3 % (ref 42.7–76)
NRBC BLD AUTO-RTO: 0.2 /100 WBC (ref 0–0.2)
PCO2 BLDA: 33.7 MM HG (ref 35–45)
PH BLDA: 7.51 PH UNITS (ref 7.35–7.45)
PLATELET # BLD AUTO: 95 10*3/MM3 (ref 140–450)
PMV BLD AUTO: 12.2 FL (ref 6–12)
PO2 BLDA: 54.9 MM HG (ref 80–100)
POTASSIUM BLD-SCNC: 3.3 MMOL/L (ref 3.5–5.2)
POTASSIUM BLD-SCNC: 3.4 MMOL/L (ref 3.5–5.2)
PROT SERPL-MCNC: 6.8 G/DL (ref 6–8.5)
RBC # BLD AUTO: 3.46 10*6/MM3 (ref 3.77–5.28)
SAO2 % BLDCOA: 91.3 % (ref 92–99)
SODIUM BLD-SCNC: 138 MMOL/L (ref 136–145)
SODIUM BLD-SCNC: 139 MMOL/L (ref 136–145)
TOTAL RATE: 22 BREATHS/MINUTE
WBC NRBC COR # BLD: 4.53 10*3/MM3 (ref 3.4–10.8)

## 2020-04-23 PROCEDURE — 63710000001 INSULIN LISPRO (HUMAN) PER 5 UNITS: Performed by: INTERNAL MEDICINE

## 2020-04-23 PROCEDURE — 25010000002 LORAZEPAM PER 2 MG: Performed by: INTERNAL MEDICINE

## 2020-04-23 PROCEDURE — 80053 COMPREHEN METABOLIC PANEL: CPT | Performed by: HOSPITALIST

## 2020-04-23 PROCEDURE — 25010000002 LEVOFLOXACIN PER 250 MG: Performed by: INTERNAL MEDICINE

## 2020-04-23 PROCEDURE — 36600 WITHDRAWAL OF ARTERIAL BLOOD: CPT

## 2020-04-23 PROCEDURE — 82803 BLOOD GASES ANY COMBINATION: CPT

## 2020-04-23 PROCEDURE — 63710000001 PREDNISONE PER 1 MG: Performed by: INTERNAL MEDICINE

## 2020-04-23 PROCEDURE — 63710000001 INSULIN GLARGINE PER 5 UNITS: Performed by: INTERNAL MEDICINE

## 2020-04-23 PROCEDURE — 94799 UNLISTED PULMONARY SVC/PX: CPT

## 2020-04-23 PROCEDURE — 85025 COMPLETE CBC W/AUTO DIFF WBC: CPT | Performed by: HOSPITALIST

## 2020-04-23 PROCEDURE — 25010000002 ONDANSETRON PER 1 MG: Performed by: INTERNAL MEDICINE

## 2020-04-23 PROCEDURE — 25010000002 METHYLPREDNISOLONE PER 125 MG: Performed by: INTERNAL MEDICINE

## 2020-04-23 PROCEDURE — 99222 1ST HOSP IP/OBS MODERATE 55: CPT | Performed by: INTERNAL MEDICINE

## 2020-04-23 PROCEDURE — 93005 ELECTROCARDIOGRAM TRACING: CPT | Performed by: HOSPITALIST

## 2020-04-23 PROCEDURE — 25010000002 FUROSEMIDE PER 20 MG: Performed by: INTERNAL MEDICINE

## 2020-04-23 PROCEDURE — 71045 X-RAY EXAM CHEST 1 VIEW: CPT

## 2020-04-23 PROCEDURE — 93010 ELECTROCARDIOGRAM REPORT: CPT | Performed by: INTERNAL MEDICINE

## 2020-04-23 PROCEDURE — 82728 ASSAY OF FERRITIN: CPT | Performed by: HOSPITALIST

## 2020-04-23 PROCEDURE — 63710000001 ONDANSETRON PER 8 MG: Performed by: INTERNAL MEDICINE

## 2020-04-23 PROCEDURE — 82962 GLUCOSE BLOOD TEST: CPT

## 2020-04-23 PROCEDURE — 25010000002 MAGNESIUM SULFATE IN D5W 1G/100ML (PREMIX) 1-5 GM/100ML-% SOLUTION: Performed by: HOSPITALIST

## 2020-04-23 PROCEDURE — 83735 ASSAY OF MAGNESIUM: CPT | Performed by: HOSPITALIST

## 2020-04-23 RX ORDER — DEXTROSE MONOHYDRATE 25 G/50ML
25 INJECTION, SOLUTION INTRAVENOUS
Status: DISCONTINUED | OUTPATIENT
Start: 2020-04-23 | End: 2020-05-01 | Stop reason: HOSPADM

## 2020-04-23 RX ORDER — LOPERAMIDE HYDROCHLORIDE 2 MG/1
2 CAPSULE ORAL 4 TIMES DAILY PRN
Status: DISCONTINUED | OUTPATIENT
Start: 2020-04-23 | End: 2020-04-27 | Stop reason: SDUPTHER

## 2020-04-23 RX ORDER — PREDNISONE 20 MG/1
40 TABLET ORAL
Status: DISCONTINUED | OUTPATIENT
Start: 2020-04-23 | End: 2020-04-27

## 2020-04-23 RX ORDER — FUROSEMIDE 10 MG/ML
40 INJECTION INTRAMUSCULAR; INTRAVENOUS ONCE
Status: COMPLETED | OUTPATIENT
Start: 2020-04-23 | End: 2020-04-23

## 2020-04-23 RX ORDER — MAGNESIUM SULFATE 1 G/100ML
2 INJECTION INTRAVENOUS ONCE
Status: COMPLETED | OUTPATIENT
Start: 2020-04-23 | End: 2020-04-23

## 2020-04-23 RX ORDER — HYDROCODONE BITARTRATE AND ACETAMINOPHEN 5; 325 MG/1; MG/1
1 TABLET ORAL ONCE AS NEEDED
Status: COMPLETED | OUTPATIENT
Start: 2020-04-23 | End: 2020-04-23

## 2020-04-23 RX ORDER — NICOTINE POLACRILEX 4 MG
15 LOZENGE BUCCAL
Status: DISCONTINUED | OUTPATIENT
Start: 2020-04-23 | End: 2020-05-01 | Stop reason: HOSPADM

## 2020-04-23 RX ADMIN — IPRATROPIUM BROMIDE AND ALBUTEROL SULFATE 3 ML: 2.5; .5 SOLUTION RESPIRATORY (INHALATION) at 17:13

## 2020-04-23 RX ADMIN — POTASSIUM CHLORIDE 40 MEQ: 10 CAPSULE, COATED, EXTENDED RELEASE ORAL at 08:40

## 2020-04-23 RX ADMIN — LORAZEPAM 1 MG: 1 TABLET ORAL at 05:30

## 2020-04-23 RX ADMIN — LORAZEPAM 1 MG: 2 INJECTION INTRAMUSCULAR; INTRAVENOUS at 08:40

## 2020-04-23 RX ADMIN — IPRATROPIUM BROMIDE AND ALBUTEROL SULFATE 3 ML: 2.5; .5 SOLUTION RESPIRATORY (INHALATION) at 13:36

## 2020-04-23 RX ADMIN — METOPROLOL TARTRATE 25 MG: 25 TABLET ORAL at 08:40

## 2020-04-23 RX ADMIN — POTASSIUM CHLORIDE 40 MEQ: 10 CAPSULE, COATED, EXTENDED RELEASE ORAL at 17:43

## 2020-04-23 RX ADMIN — METHYLPREDNISOLONE SODIUM SUCCINATE 125 MG: 125 INJECTION, POWDER, FOR SOLUTION INTRAMUSCULAR; INTRAVENOUS at 02:07

## 2020-04-23 RX ADMIN — IPRATROPIUM BROMIDE AND ALBUTEROL SULFATE 3 ML: 2.5; .5 SOLUTION RESPIRATORY (INHALATION) at 09:15

## 2020-04-23 RX ADMIN — IPRATROPIUM BROMIDE AND ALBUTEROL SULFATE 3 ML: 2.5; .5 SOLUTION RESPIRATORY (INHALATION) at 20:48

## 2020-04-23 RX ADMIN — INSULIN GLARGINE 10 UNITS: 100 INJECTION, SOLUTION SUBCUTANEOUS at 08:54

## 2020-04-23 RX ADMIN — SODIUM CHLORIDE, PRESERVATIVE FREE 10 ML: 5 INJECTION INTRAVENOUS at 08:41

## 2020-04-23 RX ADMIN — Medication 1 TABLET: at 08:40

## 2020-04-23 RX ADMIN — LOPERAMIDE HYDROCHLORIDE 2 MG: 2 CAPSULE ORAL at 18:43

## 2020-04-23 RX ADMIN — ONDANSETRON 4 MG: 2 INJECTION INTRAMUSCULAR; INTRAVENOUS at 13:13

## 2020-04-23 RX ADMIN — MAGNESIUM SULFATE 2 G: 1 INJECTION INTRAVENOUS at 23:03

## 2020-04-23 RX ADMIN — ONDANSETRON HYDROCHLORIDE 4 MG: 4 TABLET, FILM COATED ORAL at 18:43

## 2020-04-23 RX ADMIN — CHLORDIAZEPOXIDE HYDROCHLORIDE 25 MG: 25 CAPSULE ORAL at 14:08

## 2020-04-23 RX ADMIN — SODIUM CHLORIDE, PRESERVATIVE FREE 10 ML: 5 INJECTION INTRAVENOUS at 23:05

## 2020-04-23 RX ADMIN — FOLIC ACID 1 MG: 1 TABLET ORAL at 08:40

## 2020-04-23 RX ADMIN — ACETAMINOPHEN 650 MG: 325 TABLET, FILM COATED ORAL at 08:40

## 2020-04-23 RX ADMIN — LORAZEPAM 1 MG: 2 INJECTION INTRAMUSCULAR; INTRAVENOUS at 23:02

## 2020-04-23 RX ADMIN — PREDNISONE 40 MG: 20 TABLET ORAL at 11:34

## 2020-04-23 RX ADMIN — METOPROLOL TARTRATE 25 MG: 25 TABLET ORAL at 23:03

## 2020-04-23 RX ADMIN — BUDESONIDE 0.5 MG: 0.5 INHALANT RESPIRATORY (INHALATION) at 09:24

## 2020-04-23 RX ADMIN — LOSARTAN POTASSIUM 25 MG: 25 TABLET, FILM COATED ORAL at 08:40

## 2020-04-23 RX ADMIN — CHLORDIAZEPOXIDE HYDROCHLORIDE 25 MG: 25 CAPSULE ORAL at 23:02

## 2020-04-23 RX ADMIN — ONDANSETRON 4 MG: 2 INJECTION INTRAMUSCULAR; INTRAVENOUS at 05:04

## 2020-04-23 RX ADMIN — Medication 100 MG: at 08:40

## 2020-04-23 RX ADMIN — POTASSIUM CHLORIDE 40 MEQ: 10 CAPSULE, COATED, EXTENDED RELEASE ORAL at 14:05

## 2020-04-23 RX ADMIN — POTASSIUM CHLORIDE 40 MEQ: 10 CAPSULE, COATED, EXTENDED RELEASE ORAL at 23:04

## 2020-04-23 RX ADMIN — INSULIN LISPRO 4 UNITS: 100 INJECTION, SOLUTION INTRAVENOUS; SUBCUTANEOUS at 11:35

## 2020-04-23 RX ADMIN — FUROSEMIDE 40 MG: 10 INJECTION, SOLUTION INTRAMUSCULAR; INTRAVENOUS at 11:34

## 2020-04-23 RX ADMIN — BUDESONIDE 0.5 MG: 0.5 INHALANT RESPIRATORY (INHALATION) at 20:48

## 2020-04-23 RX ADMIN — LEVOFLOXACIN 750 MG: 5 INJECTION, SOLUTION INTRAVENOUS at 19:21

## 2020-04-23 RX ADMIN — CHLORDIAZEPOXIDE HYDROCHLORIDE 25 MG: 25 CAPSULE ORAL at 05:04

## 2020-04-23 RX ADMIN — HYDROCODONE BITARTRATE AND ACETAMINOPHEN 1 TABLET: 5; 325 TABLET ORAL at 17:43

## 2020-04-23 RX ADMIN — DEXMEDETOMIDINE HYDROCHLORIDE 0.5 MCG/KG/HR: 4 INJECTION INTRAVENOUS at 02:08

## 2020-04-24 ENCOUNTER — APPOINTMENT (OUTPATIENT)
Dept: GENERAL RADIOLOGY | Facility: HOSPITAL | Age: 60
End: 2020-04-24

## 2020-04-24 LAB
ANION GAP SERPL CALCULATED.3IONS-SCNC: 14.5 MMOL/L (ref 5–15)
BASOPHILS # BLD AUTO: 0.01 10*3/MM3 (ref 0–0.2)
BASOPHILS NFR BLD AUTO: 0.2 % (ref 0–1.5)
BUN BLD-MCNC: 17 MG/DL (ref 8–23)
BUN/CREAT SERPL: 28.8 (ref 7–25)
CALCIUM SPEC-SCNC: 8 MG/DL (ref 8.6–10.5)
CHLORIDE SERPL-SCNC: 101 MMOL/L (ref 98–107)
CO2 SERPL-SCNC: 25.5 MMOL/L (ref 22–29)
CREAT BLD-MCNC: 0.59 MG/DL (ref 0.57–1)
DEPRECATED RDW RBC AUTO: 59.7 FL (ref 37–54)
EOSINOPHIL # BLD AUTO: 0.01 10*3/MM3 (ref 0–0.4)
EOSINOPHIL NFR BLD AUTO: 0.2 % (ref 0.3–6.2)
ERYTHROCYTE [DISTWIDTH] IN BLOOD BY AUTOMATED COUNT: 18.5 % (ref 12.3–15.4)
GFR SERPL CREATININE-BSD FRML MDRD: 103 ML/MIN/1.73
GLUCOSE BLD-MCNC: 171 MG/DL (ref 65–99)
GLUCOSE BLDC GLUCOMTR-MCNC: 166 MG/DL (ref 70–130)
GLUCOSE BLDC GLUCOMTR-MCNC: 231 MG/DL (ref 70–130)
GLUCOSE BLDC GLUCOMTR-MCNC: 248 MG/DL (ref 70–130)
HCT VFR BLD AUTO: 30.9 % (ref 34–46.6)
HGB BLD-MCNC: 10.1 G/DL (ref 12–15.9)
LYMPHOCYTES # BLD AUTO: 0.92 10*3/MM3 (ref 0.7–3.1)
LYMPHOCYTES NFR BLD AUTO: 16.5 % (ref 19.6–45.3)
MAGNESIUM SERPL-MCNC: 1.9 MG/DL (ref 1.6–2.4)
MCH RBC QN AUTO: 28.9 PG (ref 26.6–33)
MCHC RBC AUTO-ENTMCNC: 32.7 G/DL (ref 31.5–35.7)
MCV RBC AUTO: 88.3 FL (ref 79–97)
MONOCYTES # BLD AUTO: 0.39 10*3/MM3 (ref 0.1–0.9)
MONOCYTES NFR BLD AUTO: 7 % (ref 5–12)
NEUTROPHILS # BLD AUTO: 4.19 10*3/MM3 (ref 1.7–7)
NEUTROPHILS NFR BLD AUTO: 74.8 % (ref 42.7–76)
PLATELET # BLD AUTO: 125 10*3/MM3 (ref 140–450)
PMV BLD AUTO: 11.8 FL (ref 6–12)
POTASSIUM BLD-SCNC: 3.4 MMOL/L (ref 3.5–5.2)
POTASSIUM BLD-SCNC: 3.4 MMOL/L (ref 3.5–5.2)
RBC # BLD AUTO: 3.5 10*6/MM3 (ref 3.77–5.28)
SODIUM BLD-SCNC: 141 MMOL/L (ref 136–145)
TROPONIN T SERPL-MCNC: 0.06 NG/ML (ref 0–0.03)
WBC NRBC COR # BLD: 5.59 10*3/MM3 (ref 3.4–10.8)

## 2020-04-24 PROCEDURE — 25010000002 LEVOFLOXACIN PER 250 MG: Performed by: INTERNAL MEDICINE

## 2020-04-24 PROCEDURE — 99222 1ST HOSP IP/OBS MODERATE 55: CPT | Performed by: INTERNAL MEDICINE

## 2020-04-24 PROCEDURE — 25010000002 LORAZEPAM PER 2 MG: Performed by: INTERNAL MEDICINE

## 2020-04-24 PROCEDURE — 94799 UNLISTED PULMONARY SVC/PX: CPT

## 2020-04-24 PROCEDURE — 63710000001 INSULIN GLARGINE PER 5 UNITS: Performed by: INTERNAL MEDICINE

## 2020-04-24 PROCEDURE — 25010000002 MAGNESIUM SULFATE IN D5W 1G/100ML (PREMIX) 1-5 GM/100ML-% SOLUTION: Performed by: INTERNAL MEDICINE

## 2020-04-24 PROCEDURE — 25010000002 FUROSEMIDE PER 20 MG: Performed by: INTERNAL MEDICINE

## 2020-04-24 PROCEDURE — 93010 ELECTROCARDIOGRAM REPORT: CPT | Performed by: INTERNAL MEDICINE

## 2020-04-24 PROCEDURE — 84484 ASSAY OF TROPONIN QUANT: CPT | Performed by: NURSE PRACTITIONER

## 2020-04-24 PROCEDURE — 82962 GLUCOSE BLOOD TEST: CPT

## 2020-04-24 PROCEDURE — 84132 ASSAY OF SERUM POTASSIUM: CPT | Performed by: NURSE PRACTITIONER

## 2020-04-24 PROCEDURE — 85025 COMPLETE CBC W/AUTO DIFF WBC: CPT | Performed by: HOSPITALIST

## 2020-04-24 PROCEDURE — 93005 ELECTROCARDIOGRAM TRACING: CPT | Performed by: NURSE PRACTITIONER

## 2020-04-24 PROCEDURE — 83735 ASSAY OF MAGNESIUM: CPT | Performed by: HOSPITALIST

## 2020-04-24 PROCEDURE — 72100 X-RAY EXAM L-S SPINE 2/3 VWS: CPT

## 2020-04-24 PROCEDURE — 63710000001 PREDNISONE PER 1 MG: Performed by: INTERNAL MEDICINE

## 2020-04-24 PROCEDURE — 63710000001 ONDANSETRON PER 8 MG: Performed by: INTERNAL MEDICINE

## 2020-04-24 PROCEDURE — 80048 BASIC METABOLIC PNL TOTAL CA: CPT | Performed by: HOSPITALIST

## 2020-04-24 RX ORDER — FUROSEMIDE 10 MG/ML
40 INJECTION INTRAMUSCULAR; INTRAVENOUS EVERY 12 HOURS
Status: COMPLETED | OUTPATIENT
Start: 2020-04-24 | End: 2020-04-25

## 2020-04-24 RX ORDER — HYDROCODONE BITARTRATE AND ACETAMINOPHEN 5; 325 MG/1; MG/1
1 TABLET ORAL EVERY 6 HOURS PRN
Status: DISCONTINUED | OUTPATIENT
Start: 2020-04-24 | End: 2020-04-28

## 2020-04-24 RX ORDER — LOSARTAN POTASSIUM 50 MG/1
50 TABLET ORAL DAILY
Status: DISCONTINUED | OUTPATIENT
Start: 2020-04-25 | End: 2020-05-01

## 2020-04-24 RX ORDER — MAGNESIUM SULFATE 1 G/100ML
2 INJECTION INTRAVENOUS ONCE
Status: COMPLETED | OUTPATIENT
Start: 2020-04-24 | End: 2020-04-24

## 2020-04-24 RX ORDER — NICOTINE POLACRILEX 4 MG
15 LOZENGE BUCCAL
Status: DISCONTINUED | OUTPATIENT
Start: 2020-04-24 | End: 2020-05-01 | Stop reason: HOSPADM

## 2020-04-24 RX ORDER — POTASSIUM CHLORIDE 750 MG/1
40 CAPSULE, EXTENDED RELEASE ORAL DAILY
Status: DISCONTINUED | OUTPATIENT
Start: 2020-04-24 | End: 2020-04-25

## 2020-04-24 RX ORDER — ISOSORBIDE MONONITRATE 30 MG/1
30 TABLET, EXTENDED RELEASE ORAL
Status: DISCONTINUED | OUTPATIENT
Start: 2020-04-24 | End: 2020-05-01

## 2020-04-24 RX ORDER — CARVEDILOL 6.25 MG/1
6.25 TABLET ORAL 2 TIMES DAILY WITH MEALS
Status: DISCONTINUED | OUTPATIENT
Start: 2020-04-24 | End: 2020-05-01 | Stop reason: HOSPADM

## 2020-04-24 RX ORDER — CARVEDILOL 3.12 MG/1
3.12 TABLET ORAL 2 TIMES DAILY WITH MEALS
Status: DISCONTINUED | OUTPATIENT
Start: 2020-04-24 | End: 2020-04-24

## 2020-04-24 RX ORDER — DEXTROSE MONOHYDRATE 25 G/50ML
25 INJECTION, SOLUTION INTRAVENOUS
Status: DISCONTINUED | OUTPATIENT
Start: 2020-04-24 | End: 2020-05-01 | Stop reason: HOSPADM

## 2020-04-24 RX ADMIN — LORAZEPAM 1 MG: 2 INJECTION INTRAMUSCULAR; INTRAVENOUS at 21:34

## 2020-04-24 RX ADMIN — HYDROCODONE BITARTRATE AND ACETAMINOPHEN 1 TABLET: 5; 325 TABLET ORAL at 11:59

## 2020-04-24 RX ADMIN — LORAZEPAM 1 MG: 2 INJECTION INTRAMUSCULAR; INTRAVENOUS at 09:24

## 2020-04-24 RX ADMIN — SODIUM CHLORIDE, PRESERVATIVE FREE 10 ML: 5 INJECTION INTRAVENOUS at 21:35

## 2020-04-24 RX ADMIN — QUETIAPINE FUMARATE 50 MG: 50 TABLET, FILM COATED ORAL at 21:34

## 2020-04-24 RX ADMIN — FUROSEMIDE 40 MG: 10 INJECTION, SOLUTION INTRAMUSCULAR; INTRAVENOUS at 11:59

## 2020-04-24 RX ADMIN — IPRATROPIUM BROMIDE AND ALBUTEROL SULFATE 3 ML: 2.5; .5 SOLUTION RESPIRATORY (INHALATION) at 12:07

## 2020-04-24 RX ADMIN — Medication 1 TABLET: at 11:59

## 2020-04-24 RX ADMIN — BUDESONIDE 0.5 MG: 0.5 INHALANT RESPIRATORY (INHALATION) at 08:22

## 2020-04-24 RX ADMIN — LEVOFLOXACIN 750 MG: 5 INJECTION, SOLUTION INTRAVENOUS at 16:00

## 2020-04-24 RX ADMIN — CHLORDIAZEPOXIDE HYDROCHLORIDE 25 MG: 25 CAPSULE ORAL at 15:58

## 2020-04-24 RX ADMIN — CHLORDIAZEPOXIDE HYDROCHLORIDE 25 MG: 25 CAPSULE ORAL at 21:34

## 2020-04-24 RX ADMIN — MAGNESIUM SULFATE 2 G: 1 INJECTION INTRAVENOUS at 09:23

## 2020-04-24 RX ADMIN — BUDESONIDE 0.5 MG: 0.5 INHALANT RESPIRATORY (INHALATION) at 20:28

## 2020-04-24 RX ADMIN — IPRATROPIUM BROMIDE AND ALBUTEROL SULFATE 3 ML: 2.5; .5 SOLUTION RESPIRATORY (INHALATION) at 20:28

## 2020-04-24 RX ADMIN — IPRATROPIUM BROMIDE AND ALBUTEROL SULFATE 3 ML: .5; 3 SOLUTION RESPIRATORY (INHALATION) at 02:36

## 2020-04-24 RX ADMIN — Medication 100 MG: at 11:58

## 2020-04-24 RX ADMIN — NITROGLYCERIN 1 INCH: 20 OINTMENT TOPICAL at 05:56

## 2020-04-24 RX ADMIN — CARVEDILOL 6.25 MG: 6.25 TABLET, FILM COATED ORAL at 11:59

## 2020-04-24 RX ADMIN — FOLIC ACID 1 MG: 1 TABLET ORAL at 11:59

## 2020-04-24 RX ADMIN — INSULIN GLARGINE 10 UNITS: 100 INJECTION, SOLUTION SUBCUTANEOUS at 09:25

## 2020-04-24 RX ADMIN — PREDNISONE 40 MG: 20 TABLET ORAL at 09:22

## 2020-04-24 RX ADMIN — IPRATROPIUM BROMIDE AND ALBUTEROL SULFATE 3 ML: 2.5; .5 SOLUTION RESPIRATORY (INHALATION) at 08:21

## 2020-04-24 RX ADMIN — IPRATROPIUM BROMIDE AND ALBUTEROL SULFATE 3 ML: 2.5; .5 SOLUTION RESPIRATORY (INHALATION) at 16:03

## 2020-04-24 RX ADMIN — CARVEDILOL 6.25 MG: 6.25 TABLET, FILM COATED ORAL at 19:25

## 2020-04-24 RX ADMIN — HYDROCODONE BITARTRATE AND ACETAMINOPHEN 1 TABLET: 5; 325 TABLET ORAL at 15:59

## 2020-04-24 RX ADMIN — FUROSEMIDE 40 MG: 10 INJECTION, SOLUTION INTRAMUSCULAR; INTRAVENOUS at 21:35

## 2020-04-24 RX ADMIN — ISOSORBIDE MONONITRATE 30 MG: 30 TABLET ORAL at 11:58

## 2020-04-24 RX ADMIN — POTASSIUM CHLORIDE 40 MEQ: 10 CAPSULE, COATED, EXTENDED RELEASE ORAL at 09:23

## 2020-04-24 RX ADMIN — METOPROLOL TARTRATE 25 MG: 25 TABLET ORAL at 09:23

## 2020-04-24 RX ADMIN — LOSARTAN POTASSIUM 25 MG: 25 TABLET, FILM COATED ORAL at 09:23

## 2020-04-24 RX ADMIN — SODIUM CHLORIDE, PRESERVATIVE FREE 10 ML: 5 INJECTION INTRAVENOUS at 12:00

## 2020-04-24 RX ADMIN — ONDANSETRON HYDROCHLORIDE 4 MG: 4 TABLET, FILM COATED ORAL at 00:53

## 2020-04-25 ENCOUNTER — APPOINTMENT (OUTPATIENT)
Dept: CT IMAGING | Facility: HOSPITAL | Age: 60
End: 2020-04-25

## 2020-04-25 LAB
ANION GAP SERPL CALCULATED.3IONS-SCNC: 13.8 MMOL/L (ref 5–15)
ANISOCYTOSIS BLD QL: ABNORMAL
ARTERIAL PATENCY WRIST A: POSITIVE
ATMOSPHERIC PRESS: 741.6 MMHG
BASE EXCESS BLDA CALC-SCNC: 6.1 MMOL/L (ref 0–2)
BASOPHILS # BLD MANUAL: 0.05 10*3/MM3 (ref 0–0.2)
BASOPHILS NFR BLD AUTO: 1 % (ref 0–1.5)
BDY SITE: ABNORMAL
BUN BLD-MCNC: 12 MG/DL (ref 8–23)
BUN/CREAT SERPL: 19.4 (ref 7–25)
CALCIUM SPEC-SCNC: 8 MG/DL (ref 8.6–10.5)
CHLORIDE SERPL-SCNC: 93 MMOL/L (ref 98–107)
CO2 SERPL-SCNC: 26.2 MMOL/L (ref 22–29)
CREAT BLD-MCNC: 0.62 MG/DL (ref 0.57–1)
DEPRECATED RDW RBC AUTO: 60 FL (ref 37–54)
EOSINOPHIL # BLD MANUAL: 0.11 10*3/MM3 (ref 0–0.4)
EOSINOPHIL NFR BLD MANUAL: 2.1 % (ref 0.3–6.2)
ERYTHROCYTE [DISTWIDTH] IN BLOOD BY AUTOMATED COUNT: 18.4 % (ref 12.3–15.4)
GAS FLOW AIRWAY: 4 LPM
GFR SERPL CREATININE-BSD FRML MDRD: 98 ML/MIN/1.73
GLUCOSE BLD-MCNC: 177 MG/DL (ref 65–99)
GLUCOSE BLDC GLUCOMTR-MCNC: 130 MG/DL (ref 70–130)
GLUCOSE BLDC GLUCOMTR-MCNC: 192 MG/DL (ref 70–130)
GLUCOSE BLDC GLUCOMTR-MCNC: 316 MG/DL (ref 70–130)
HCO3 BLDA-SCNC: 28.6 MMOL/L (ref 22–28)
HCT VFR BLD AUTO: 32.7 % (ref 34–46.6)
HGB BLD-MCNC: 10.6 G/DL (ref 12–15.9)
LYMPHOCYTES # BLD MANUAL: 0.71 10*3/MM3 (ref 0.7–3.1)
LYMPHOCYTES NFR BLD MANUAL: 13.4 % (ref 19.6–45.3)
LYMPHOCYTES NFR BLD MANUAL: 7.2 % (ref 5–12)
MCH RBC QN AUTO: 29.1 PG (ref 26.6–33)
MCHC RBC AUTO-ENTMCNC: 32.4 G/DL (ref 31.5–35.7)
MCV RBC AUTO: 89.8 FL (ref 79–97)
MODALITY: ABNORMAL
MONOCYTES # BLD AUTO: 0.38 10*3/MM3 (ref 0.1–0.9)
NEUTROPHILS # BLD AUTO: 4.04 10*3/MM3 (ref 1.7–7)
NEUTROPHILS NFR BLD MANUAL: 76.3 % (ref 42.7–76)
PCO2 BLDA: 33.4 MM HG (ref 35–45)
PH BLDA: 7.54 PH UNITS (ref 7.35–7.45)
PLAT MORPH BLD: NORMAL
PLATELET # BLD AUTO: 126 10*3/MM3 (ref 140–450)
PMV BLD AUTO: 12.2 FL (ref 6–12)
PO2 BLDA: 59.2 MM HG (ref 80–100)
POIKILOCYTOSIS BLD QL SMEAR: ABNORMAL
POLYCHROMASIA BLD QL SMEAR: ABNORMAL
POTASSIUM BLD-SCNC: 3 MMOL/L (ref 3.5–5.2)
RBC # BLD AUTO: 3.64 10*6/MM3 (ref 3.77–5.28)
SAO2 % BLDCOA: 93.4 % (ref 92–99)
SET MECH RESP RATE: 20
SODIUM BLD-SCNC: 133 MMOL/L (ref 136–145)
TROPONIN T SERPL-MCNC: 0.07 NG/ML (ref 0–0.03)
TROPONIN T SERPL-MCNC: 0.07 NG/ML (ref 0–0.03)
TSH SERPL DL<=0.05 MIU/L-ACNC: 6.45 UIU/ML (ref 0.27–4.2)
WBC MORPH BLD: NORMAL
WBC NRBC COR # BLD: 5.3 10*3/MM3 (ref 3.4–10.8)

## 2020-04-25 PROCEDURE — 84443 ASSAY THYROID STIM HORMONE: CPT | Performed by: INTERNAL MEDICINE

## 2020-04-25 PROCEDURE — 99232 SBSQ HOSP IP/OBS MODERATE 35: CPT | Performed by: INTERNAL MEDICINE

## 2020-04-25 PROCEDURE — 93010 ELECTROCARDIOGRAM REPORT: CPT | Performed by: INTERNAL MEDICINE

## 2020-04-25 PROCEDURE — 84484 ASSAY OF TROPONIN QUANT: CPT | Performed by: INTERNAL MEDICINE

## 2020-04-25 PROCEDURE — 36600 WITHDRAWAL OF ARTERIAL BLOOD: CPT

## 2020-04-25 PROCEDURE — 25010000002 LEVOFLOXACIN PER 250 MG: Performed by: INTERNAL MEDICINE

## 2020-04-25 PROCEDURE — 25010000002 ONDANSETRON PER 1 MG: Performed by: INTERNAL MEDICINE

## 2020-04-25 PROCEDURE — 25010000002 LORAZEPAM PER 2 MG: Performed by: INTERNAL MEDICINE

## 2020-04-25 PROCEDURE — 63710000001 INSULIN GLARGINE PER 5 UNITS: Performed by: INTERNAL MEDICINE

## 2020-04-25 PROCEDURE — 94799 UNLISTED PULMONARY SVC/PX: CPT

## 2020-04-25 PROCEDURE — 82803 BLOOD GASES ANY COMBINATION: CPT

## 2020-04-25 PROCEDURE — 85025 COMPLETE CBC W/AUTO DIFF WBC: CPT | Performed by: HOSPITALIST

## 2020-04-25 PROCEDURE — 25010000002 FUROSEMIDE PER 20 MG: Performed by: INTERNAL MEDICINE

## 2020-04-25 PROCEDURE — 70450 CT HEAD/BRAIN W/O DYE: CPT

## 2020-04-25 PROCEDURE — 85007 BL SMEAR W/DIFF WBC COUNT: CPT | Performed by: HOSPITALIST

## 2020-04-25 PROCEDURE — 93005 ELECTROCARDIOGRAM TRACING: CPT | Performed by: INTERNAL MEDICINE

## 2020-04-25 PROCEDURE — 82962 GLUCOSE BLOOD TEST: CPT

## 2020-04-25 PROCEDURE — 63710000001 INSULIN LISPRO (HUMAN) PER 5 UNITS: Performed by: HOSPITALIST

## 2020-04-25 PROCEDURE — 80048 BASIC METABOLIC PNL TOTAL CA: CPT | Performed by: INTERNAL MEDICINE

## 2020-04-25 PROCEDURE — 63710000001 PREDNISONE PER 1 MG: Performed by: INTERNAL MEDICINE

## 2020-04-25 PROCEDURE — 93005 ELECTROCARDIOGRAM TRACING: CPT | Performed by: HOSPITALIST

## 2020-04-25 PROCEDURE — 84484 ASSAY OF TROPONIN QUANT: CPT | Performed by: HOSPITALIST

## 2020-04-25 RX ORDER — POTASSIUM CHLORIDE 750 MG/1
40 CAPSULE, EXTENDED RELEASE ORAL 2 TIMES DAILY WITH MEALS
Status: DISCONTINUED | OUTPATIENT
Start: 2020-04-25 | End: 2020-04-28

## 2020-04-25 RX ORDER — FUROSEMIDE 40 MG/1
40 TABLET ORAL DAILY
Status: DISCONTINUED | OUTPATIENT
Start: 2020-04-26 | End: 2020-04-28

## 2020-04-25 RX ORDER — FUROSEMIDE 40 MG/1
40 TABLET ORAL DAILY
Status: DISCONTINUED | OUTPATIENT
Start: 2020-04-25 | End: 2020-04-25

## 2020-04-25 RX ADMIN — ONDANSETRON 4 MG: 2 INJECTION INTRAMUSCULAR; INTRAVENOUS at 17:14

## 2020-04-25 RX ADMIN — SODIUM CHLORIDE, PRESERVATIVE FREE 10 ML: 5 INJECTION INTRAVENOUS at 09:07

## 2020-04-25 RX ADMIN — LORAZEPAM 1 MG: 2 INJECTION INTRAMUSCULAR; INTRAVENOUS at 08:56

## 2020-04-25 RX ADMIN — FUROSEMIDE 40 MG: 10 INJECTION, SOLUTION INTRAMUSCULAR; INTRAVENOUS at 10:08

## 2020-04-25 RX ADMIN — CARVEDILOL 6.25 MG: 6.25 TABLET, FILM COATED ORAL at 08:51

## 2020-04-25 RX ADMIN — IPRATROPIUM BROMIDE AND ALBUTEROL SULFATE 3 ML: 2.5; .5 SOLUTION RESPIRATORY (INHALATION) at 16:20

## 2020-04-25 RX ADMIN — BUDESONIDE 0.5 MG: 0.5 INHALANT RESPIRATORY (INHALATION) at 07:25

## 2020-04-25 RX ADMIN — CHLORDIAZEPOXIDE HYDROCHLORIDE 25 MG: 25 CAPSULE ORAL at 22:05

## 2020-04-25 RX ADMIN — POTASSIUM CHLORIDE 40 MEQ: 750 CAPSULE, EXTENDED RELEASE ORAL at 09:02

## 2020-04-25 RX ADMIN — INSULIN GLARGINE 10 UNITS: 100 INJECTION, SOLUTION SUBCUTANEOUS at 08:00

## 2020-04-25 RX ADMIN — LOSARTAN POTASSIUM 50 MG: 50 TABLET, FILM COATED ORAL at 08:50

## 2020-04-25 RX ADMIN — QUETIAPINE FUMARATE 50 MG: 50 TABLET, FILM COATED ORAL at 20:16

## 2020-04-25 RX ADMIN — POTASSIUM CHLORIDE 40 MEQ: 10 CAPSULE, COATED, EXTENDED RELEASE ORAL at 14:08

## 2020-04-25 RX ADMIN — LORAZEPAM 1 MG: 2 INJECTION INTRAMUSCULAR; INTRAVENOUS at 03:37

## 2020-04-25 RX ADMIN — CHLORDIAZEPOXIDE HYDROCHLORIDE 25 MG: 25 CAPSULE ORAL at 14:09

## 2020-04-25 RX ADMIN — LEVOFLOXACIN 750 MG: 5 INJECTION, SOLUTION INTRAVENOUS at 15:43

## 2020-04-25 RX ADMIN — ACETAMINOPHEN 650 MG: 325 TABLET, FILM COATED ORAL at 06:38

## 2020-04-25 RX ADMIN — PREDNISONE 40 MG: 20 TABLET ORAL at 08:51

## 2020-04-25 RX ADMIN — IPRATROPIUM BROMIDE AND ALBUTEROL SULFATE 3 ML: 2.5; .5 SOLUTION RESPIRATORY (INHALATION) at 20:42

## 2020-04-25 RX ADMIN — LORAZEPAM 1 MG: 2 INJECTION INTRAMUSCULAR; INTRAVENOUS at 15:36

## 2020-04-25 RX ADMIN — IPRATROPIUM BROMIDE AND ALBUTEROL SULFATE 3 ML: 2.5; .5 SOLUTION RESPIRATORY (INHALATION) at 07:26

## 2020-04-25 RX ADMIN — ISOSORBIDE MONONITRATE 30 MG: 30 TABLET ORAL at 08:50

## 2020-04-25 RX ADMIN — INSULIN LISPRO 2 UNITS: 100 INJECTION, SOLUTION INTRAVENOUS; SUBCUTANEOUS at 08:51

## 2020-04-25 RX ADMIN — SODIUM CHLORIDE, PRESERVATIVE FREE 10 ML: 5 INJECTION INTRAVENOUS at 20:17

## 2020-04-25 RX ADMIN — IPRATROPIUM BROMIDE AND ALBUTEROL SULFATE 3 ML: 2.5; .5 SOLUTION RESPIRATORY (INHALATION) at 11:07

## 2020-04-25 RX ADMIN — LORAZEPAM 1 MG: 2 INJECTION INTRAMUSCULAR; INTRAVENOUS at 21:14

## 2020-04-25 RX ADMIN — INSULIN LISPRO 5 UNITS: 100 INJECTION, SOLUTION INTRAVENOUS; SUBCUTANEOUS at 17:14

## 2020-04-25 RX ADMIN — LORAZEPAM 2 MG: 1 TABLET ORAL at 22:43

## 2020-04-25 RX ADMIN — POTASSIUM CHLORIDE 40 MEQ: 10 CAPSULE, COATED, EXTENDED RELEASE ORAL at 20:16

## 2020-04-25 RX ADMIN — CHLORDIAZEPOXIDE HYDROCHLORIDE 25 MG: 25 CAPSULE ORAL at 06:38

## 2020-04-25 RX ADMIN — BUDESONIDE 0.5 MG: 0.5 INHALANT RESPIRATORY (INHALATION) at 20:42

## 2020-04-25 RX ADMIN — CARVEDILOL 6.25 MG: 6.25 TABLET, FILM COATED ORAL at 20:16

## 2020-04-26 ENCOUNTER — APPOINTMENT (OUTPATIENT)
Dept: GENERAL RADIOLOGY | Facility: HOSPITAL | Age: 60
End: 2020-04-26

## 2020-04-26 LAB
ANION GAP SERPL CALCULATED.3IONS-SCNC: 12.5 MMOL/L (ref 5–15)
BACTERIA SPEC AEROBE CULT: NORMAL
BACTERIA SPEC AEROBE CULT: NORMAL
BASOPHILS # BLD AUTO: 0 10*3/MM3 (ref 0–0.2)
BASOPHILS NFR BLD AUTO: 0 % (ref 0–1.5)
BUN BLD-MCNC: 14 MG/DL (ref 8–23)
BUN/CREAT SERPL: 16.1 (ref 7–25)
CALCIUM SPEC-SCNC: 7.8 MG/DL (ref 8.6–10.5)
CHLORIDE SERPL-SCNC: 97 MMOL/L (ref 98–107)
CO2 SERPL-SCNC: 24.5 MMOL/L (ref 22–29)
CREAT BLD-MCNC: 0.87 MG/DL (ref 0.57–1)
DEPRECATED RDW RBC AUTO: 62.2 FL (ref 37–54)
EOSINOPHIL # BLD AUTO: 0.1 10*3/MM3 (ref 0–0.4)
EOSINOPHIL NFR BLD AUTO: 2.2 % (ref 0.3–6.2)
ERYTHROCYTE [DISTWIDTH] IN BLOOD BY AUTOMATED COUNT: 18.8 % (ref 12.3–15.4)
GFR SERPL CREATININE-BSD FRML MDRD: 66 ML/MIN/1.73
GLUCOSE BLD-MCNC: 214 MG/DL (ref 65–99)
GLUCOSE BLDC GLUCOMTR-MCNC: 168 MG/DL (ref 70–130)
GLUCOSE BLDC GLUCOMTR-MCNC: 214 MG/DL (ref 70–130)
GLUCOSE BLDC GLUCOMTR-MCNC: 239 MG/DL (ref 70–130)
HCT VFR BLD AUTO: 29.4 % (ref 34–46.6)
HGB BLD-MCNC: 9.6 G/DL (ref 12–15.9)
LYMPHOCYTES # BLD AUTO: 0.99 10*3/MM3 (ref 0.7–3.1)
LYMPHOCYTES NFR BLD AUTO: 22 % (ref 19.6–45.3)
MCH RBC QN AUTO: 29.4 PG (ref 26.6–33)
MCHC RBC AUTO-ENTMCNC: 32.7 G/DL (ref 31.5–35.7)
MCV RBC AUTO: 89.9 FL (ref 79–97)
MONOCYTES # BLD AUTO: 0.61 10*3/MM3 (ref 0.1–0.9)
MONOCYTES NFR BLD AUTO: 13.5 % (ref 5–12)
NEUTROPHILS # BLD AUTO: 2.75 10*3/MM3 (ref 1.7–7)
NEUTROPHILS NFR BLD AUTO: 61 % (ref 42.7–76)
PLATELET # BLD AUTO: 137 10*3/MM3 (ref 140–450)
PMV BLD AUTO: 12.4 FL (ref 6–12)
POTASSIUM BLD-SCNC: 3.7 MMOL/L (ref 3.5–5.2)
RBC # BLD AUTO: 3.27 10*6/MM3 (ref 3.77–5.28)
SODIUM BLD-SCNC: 134 MMOL/L (ref 136–145)
WBC NRBC COR # BLD: 4.51 10*3/MM3 (ref 3.4–10.8)

## 2020-04-26 PROCEDURE — 82962 GLUCOSE BLOOD TEST: CPT

## 2020-04-26 PROCEDURE — 63710000001 INSULIN GLARGINE PER 5 UNITS: Performed by: INTERNAL MEDICINE

## 2020-04-26 PROCEDURE — 94799 UNLISTED PULMONARY SVC/PX: CPT

## 2020-04-26 PROCEDURE — 25010000002 HALOPERIDOL LACTATE PER 5 MG: Performed by: PSYCHIATRY & NEUROLOGY

## 2020-04-26 PROCEDURE — 63710000001 PREDNISONE PER 1 MG: Performed by: INTERNAL MEDICINE

## 2020-04-26 PROCEDURE — 99232 SBSQ HOSP IP/OBS MODERATE 35: CPT | Performed by: INTERNAL MEDICINE

## 2020-04-26 PROCEDURE — 99232 SBSQ HOSP IP/OBS MODERATE 35: CPT | Performed by: PSYCHIATRY & NEUROLOGY

## 2020-04-26 PROCEDURE — 25010000002 THIAMINE PER 100 MG: Performed by: PSYCHIATRY & NEUROLOGY

## 2020-04-26 PROCEDURE — 80048 BASIC METABOLIC PNL TOTAL CA: CPT | Performed by: HOSPITALIST

## 2020-04-26 PROCEDURE — 63710000001 INSULIN LISPRO (HUMAN) PER 5 UNITS: Performed by: HOSPITALIST

## 2020-04-26 PROCEDURE — 25010000002 LORAZEPAM PER 2 MG: Performed by: INTERNAL MEDICINE

## 2020-04-26 PROCEDURE — 71046 X-RAY EXAM CHEST 2 VIEWS: CPT

## 2020-04-26 PROCEDURE — 85025 COMPLETE CBC W/AUTO DIFF WBC: CPT | Performed by: HOSPITALIST

## 2020-04-26 RX ORDER — HALOPERIDOL 5 MG/1
5 TABLET ORAL
Status: DISCONTINUED | OUTPATIENT
Start: 2020-04-26 | End: 2020-04-28

## 2020-04-26 RX ORDER — HALOPERIDOL 5 MG/ML
2 INJECTION INTRAMUSCULAR
Status: DISCONTINUED | OUTPATIENT
Start: 2020-04-26 | End: 2020-05-01 | Stop reason: HOSPADM

## 2020-04-26 RX ORDER — INSULIN GLARGINE 100 [IU]/ML
15 INJECTION, SOLUTION SUBCUTANEOUS DAILY
Status: DISCONTINUED | OUTPATIENT
Start: 2020-04-27 | End: 2020-05-01 | Stop reason: HOSPADM

## 2020-04-26 RX ADMIN — LORAZEPAM 2 MG: 2 INJECTION INTRAMUSCULAR; INTRAVENOUS at 05:01

## 2020-04-26 RX ADMIN — SODIUM CHLORIDE, PRESERVATIVE FREE 10 ML: 5 INJECTION INTRAVENOUS at 22:13

## 2020-04-26 RX ADMIN — FUROSEMIDE 40 MG: 40 TABLET ORAL at 08:22

## 2020-04-26 RX ADMIN — HALOPERIDOL LACTATE 2 MG: 5 INJECTION INTRAMUSCULAR at 14:34

## 2020-04-26 RX ADMIN — CHLORDIAZEPOXIDE HYDROCHLORIDE 25 MG: 25 CAPSULE ORAL at 22:08

## 2020-04-26 RX ADMIN — IPRATROPIUM BROMIDE AND ALBUTEROL SULFATE 3 ML: 2.5; .5 SOLUTION RESPIRATORY (INHALATION) at 16:28

## 2020-04-26 RX ADMIN — IPRATROPIUM BROMIDE AND ALBUTEROL SULFATE 3 ML: 2.5; .5 SOLUTION RESPIRATORY (INHALATION) at 12:13

## 2020-04-26 RX ADMIN — PREDNISONE 40 MG: 20 TABLET ORAL at 08:22

## 2020-04-26 RX ADMIN — THIAMINE HYDROCHLORIDE 100 MG: 100 INJECTION, SOLUTION INTRAMUSCULAR; INTRAVENOUS at 14:34

## 2020-04-26 RX ADMIN — BUDESONIDE 0.5 MG: 0.5 INHALANT RESPIRATORY (INHALATION) at 19:52

## 2020-04-26 RX ADMIN — CHLORDIAZEPOXIDE HYDROCHLORIDE 25 MG: 25 CAPSULE ORAL at 14:33

## 2020-04-26 RX ADMIN — LORAZEPAM 1 MG: 2 INJECTION INTRAMUSCULAR; INTRAVENOUS at 22:08

## 2020-04-26 RX ADMIN — CHLORDIAZEPOXIDE HYDROCHLORIDE 25 MG: 25 CAPSULE ORAL at 07:19

## 2020-04-26 RX ADMIN — IPRATROPIUM BROMIDE AND ALBUTEROL SULFATE 3 ML: 2.5; .5 SOLUTION RESPIRATORY (INHALATION) at 19:52

## 2020-04-26 RX ADMIN — SODIUM CHLORIDE, PRESERVATIVE FREE 10 ML: 5 INJECTION INTRAVENOUS at 08:40

## 2020-04-26 RX ADMIN — LORAZEPAM 2 MG: 2 INJECTION INTRAMUSCULAR; INTRAVENOUS at 02:56

## 2020-04-26 RX ADMIN — LOSARTAN POTASSIUM 50 MG: 50 TABLET, FILM COATED ORAL at 08:22

## 2020-04-26 RX ADMIN — ACETAMINOPHEN 650 MG: 325 TABLET, FILM COATED ORAL at 02:55

## 2020-04-26 RX ADMIN — LORAZEPAM 2 MG: 2 INJECTION INTRAMUSCULAR; INTRAVENOUS at 10:24

## 2020-04-26 RX ADMIN — INSULIN LISPRO 2 UNITS: 100 INJECTION, SOLUTION INTRAVENOUS; SUBCUTANEOUS at 11:52

## 2020-04-26 RX ADMIN — INSULIN LISPRO 3 UNITS: 100 INJECTION, SOLUTION INTRAVENOUS; SUBCUTANEOUS at 08:40

## 2020-04-26 RX ADMIN — POTASSIUM CHLORIDE 40 MEQ: 750 CAPSULE, EXTENDED RELEASE ORAL at 17:23

## 2020-04-26 RX ADMIN — HYDROCODONE BITARTRATE AND ACETAMINOPHEN 1 TABLET: 5; 325 TABLET ORAL at 17:23

## 2020-04-26 RX ADMIN — LORAZEPAM 1 MG: 2 INJECTION INTRAMUSCULAR; INTRAVENOUS at 08:40

## 2020-04-26 RX ADMIN — POTASSIUM CHLORIDE 40 MEQ: 750 CAPSULE, EXTENDED RELEASE ORAL at 08:22

## 2020-04-26 RX ADMIN — ISOSORBIDE MONONITRATE 30 MG: 30 TABLET ORAL at 08:22

## 2020-04-26 RX ADMIN — CARVEDILOL 6.25 MG: 6.25 TABLET, FILM COATED ORAL at 08:22

## 2020-04-26 RX ADMIN — INSULIN GLARGINE 10 UNITS: 100 INJECTION, SOLUTION SUBCUTANEOUS at 08:39

## 2020-04-26 RX ADMIN — HALOPERIDOL 5 MG: 5 TABLET ORAL at 17:23

## 2020-04-26 RX ADMIN — IPRATROPIUM BROMIDE AND ALBUTEROL SULFATE 3 ML: 2.5; .5 SOLUTION RESPIRATORY (INHALATION) at 08:28

## 2020-04-26 RX ADMIN — INSULIN LISPRO 3 UNITS: 100 INJECTION, SOLUTION INTRAVENOUS; SUBCUTANEOUS at 17:24

## 2020-04-26 RX ADMIN — BUDESONIDE 0.5 MG: 0.5 INHALANT RESPIRATORY (INHALATION) at 08:28

## 2020-04-27 LAB
ANION GAP SERPL CALCULATED.3IONS-SCNC: 13.6 MMOL/L (ref 5–15)
BASOPHILS # BLD AUTO: 0.01 10*3/MM3 (ref 0–0.2)
BASOPHILS NFR BLD AUTO: 0.2 % (ref 0–1.5)
BILIRUB UR QL STRIP: NEGATIVE
BUN BLD-MCNC: 15 MG/DL (ref 8–23)
BUN/CREAT SERPL: 19.5 (ref 7–25)
CALCIUM SPEC-SCNC: 8.4 MG/DL (ref 8.6–10.5)
CHLORIDE SERPL-SCNC: 101 MMOL/L (ref 98–107)
CLARITY UR: CLEAR
CO2 SERPL-SCNC: 23.4 MMOL/L (ref 22–29)
COLOR UR: YELLOW
CREAT BLD-MCNC: 0.77 MG/DL (ref 0.57–1)
DEPRECATED RDW RBC AUTO: 62.5 FL (ref 37–54)
EOSINOPHIL # BLD AUTO: 0.1 10*3/MM3 (ref 0–0.4)
EOSINOPHIL NFR BLD AUTO: 2 % (ref 0.3–6.2)
ERYTHROCYTE [DISTWIDTH] IN BLOOD BY AUTOMATED COUNT: 18.6 % (ref 12.3–15.4)
GFR SERPL CREATININE-BSD FRML MDRD: 76 ML/MIN/1.73
GLUCOSE BLD-MCNC: 201 MG/DL (ref 65–99)
GLUCOSE BLDC GLUCOMTR-MCNC: 176 MG/DL (ref 70–130)
GLUCOSE BLDC GLUCOMTR-MCNC: 177 MG/DL (ref 70–130)
GLUCOSE BLDC GLUCOMTR-MCNC: 361 MG/DL (ref 70–130)
GLUCOSE UR STRIP-MCNC: ABNORMAL MG/DL
HCT VFR BLD AUTO: 32.1 % (ref 34–46.6)
HGB BLD-MCNC: 10.6 G/DL (ref 12–15.9)
HGB UR QL STRIP.AUTO: NEGATIVE
IMM GRANULOCYTES # BLD AUTO: 0.04 10*3/MM3 (ref 0–0.05)
IMM GRANULOCYTES NFR BLD AUTO: 0.8 % (ref 0–0.5)
KETONES UR QL STRIP: NEGATIVE
LEUKOCYTE ESTERASE UR QL STRIP.AUTO: NEGATIVE
LYMPHOCYTES # BLD AUTO: 1.08 10*3/MM3 (ref 0.7–3.1)
LYMPHOCYTES NFR BLD AUTO: 21.9 % (ref 19.6–45.3)
MCH RBC QN AUTO: 30.4 PG (ref 26.6–33)
MCHC RBC AUTO-ENTMCNC: 33 G/DL (ref 31.5–35.7)
MCV RBC AUTO: 92 FL (ref 79–97)
MONOCYTES # BLD AUTO: 0.58 10*3/MM3 (ref 0.1–0.9)
MONOCYTES NFR BLD AUTO: 11.8 % (ref 5–12)
NEUTROPHILS # BLD AUTO: 3.12 10*3/MM3 (ref 1.7–7)
NEUTROPHILS NFR BLD AUTO: 63.3 % (ref 42.7–76)
NITRITE UR QL STRIP: NEGATIVE
NRBC BLD AUTO-RTO: 0 /100 WBC (ref 0–0.2)
PH UR STRIP.AUTO: 8.5 [PH] (ref 5–8)
PLATELET # BLD AUTO: 196 10*3/MM3 (ref 140–450)
PMV BLD AUTO: 12.4 FL (ref 6–12)
POTASSIUM BLD-SCNC: 4 MMOL/L (ref 3.5–5.2)
PROT UR QL STRIP: ABNORMAL
RBC # BLD AUTO: 3.49 10*6/MM3 (ref 3.77–5.28)
SODIUM BLD-SCNC: 138 MMOL/L (ref 136–145)
SP GR UR STRIP: >=1.03 (ref 1–1.03)
UROBILINOGEN UR QL STRIP: ABNORMAL
WBC NRBC COR # BLD: 4.93 10*3/MM3 (ref 3.4–10.8)

## 2020-04-27 PROCEDURE — 99232 SBSQ HOSP IP/OBS MODERATE 35: CPT | Performed by: INTERNAL MEDICINE

## 2020-04-27 PROCEDURE — 94799 UNLISTED PULMONARY SVC/PX: CPT

## 2020-04-27 PROCEDURE — 25010000002 LORAZEPAM PER 2 MG: Performed by: INTERNAL MEDICINE

## 2020-04-27 PROCEDURE — 80048 BASIC METABOLIC PNL TOTAL CA: CPT | Performed by: HOSPITALIST

## 2020-04-27 PROCEDURE — 63710000001 INSULIN GLARGINE PER 5 UNITS: Performed by: INTERNAL MEDICINE

## 2020-04-27 PROCEDURE — 81003 URINALYSIS AUTO W/O SCOPE: CPT | Performed by: INTERNAL MEDICINE

## 2020-04-27 PROCEDURE — 99232 SBSQ HOSP IP/OBS MODERATE 35: CPT | Performed by: NURSE PRACTITIONER

## 2020-04-27 PROCEDURE — 82962 GLUCOSE BLOOD TEST: CPT

## 2020-04-27 PROCEDURE — 63710000001 PREDNISONE PER 1 MG: Performed by: INTERNAL MEDICINE

## 2020-04-27 PROCEDURE — 63710000001 INSULIN LISPRO (HUMAN) PER 5 UNITS: Performed by: HOSPITALIST

## 2020-04-27 PROCEDURE — 85025 COMPLETE CBC W/AUTO DIFF WBC: CPT | Performed by: HOSPITALIST

## 2020-04-27 RX ORDER — ASPIRIN 81 MG/1
81 TABLET ORAL DAILY
Status: DISCONTINUED | OUTPATIENT
Start: 2020-04-27 | End: 2020-05-01 | Stop reason: HOSPADM

## 2020-04-27 RX ORDER — ATORVASTATIN CALCIUM 20 MG/1
10 TABLET, FILM COATED ORAL NIGHTLY
Status: DISCONTINUED | OUTPATIENT
Start: 2020-04-27 | End: 2020-05-01 | Stop reason: HOSPADM

## 2020-04-27 RX ORDER — NICOTINE 21 MG/24HR
1 PATCH, TRANSDERMAL 24 HOURS TRANSDERMAL EVERY 24 HOURS
Status: DISCONTINUED | OUTPATIENT
Start: 2020-04-27 | End: 2020-05-01 | Stop reason: HOSPADM

## 2020-04-27 RX ORDER — LOPERAMIDE HYDROCHLORIDE 2 MG/1
2 CAPSULE ORAL 4 TIMES DAILY PRN
Status: DISCONTINUED | OUTPATIENT
Start: 2020-04-27 | End: 2020-05-01 | Stop reason: HOSPADM

## 2020-04-27 RX ADMIN — LORAZEPAM 1 MG: 2 INJECTION INTRAMUSCULAR; INTRAVENOUS at 08:10

## 2020-04-27 RX ADMIN — POTASSIUM CHLORIDE 40 MEQ: 750 CAPSULE, EXTENDED RELEASE ORAL at 08:11

## 2020-04-27 RX ADMIN — LORAZEPAM 2 MG: 2 INJECTION INTRAMUSCULAR; INTRAVENOUS at 02:15

## 2020-04-27 RX ADMIN — IPRATROPIUM BROMIDE AND ALBUTEROL SULFATE 3 ML: 2.5; .5 SOLUTION RESPIRATORY (INHALATION) at 08:04

## 2020-04-27 RX ADMIN — IPRATROPIUM BROMIDE AND ALBUTEROL SULFATE 3 ML: 2.5; .5 SOLUTION RESPIRATORY (INHALATION) at 20:03

## 2020-04-27 RX ADMIN — LOSARTAN POTASSIUM 50 MG: 50 TABLET, FILM COATED ORAL at 08:11

## 2020-04-27 RX ADMIN — HYDROCODONE BITARTRATE AND ACETAMINOPHEN 1 TABLET: 5; 325 TABLET ORAL at 13:07

## 2020-04-27 RX ADMIN — ATORVASTATIN CALCIUM 10 MG: 20 TABLET, FILM COATED ORAL at 20:38

## 2020-04-27 RX ADMIN — PREDNISONE 40 MG: 20 TABLET ORAL at 08:11

## 2020-04-27 RX ADMIN — CARVEDILOL 6.25 MG: 6.25 TABLET, FILM COATED ORAL at 08:11

## 2020-04-27 RX ADMIN — INSULIN LISPRO 2 UNITS: 100 INJECTION, SOLUTION INTRAVENOUS; SUBCUTANEOUS at 08:11

## 2020-04-27 RX ADMIN — INSULIN GLARGINE 15 UNITS: 100 INJECTION, SOLUTION SUBCUTANEOUS at 08:10

## 2020-04-27 RX ADMIN — NICOTINE 1 PATCH: 21 PATCH, EXTENDED RELEASE TRANSDERMAL at 16:27

## 2020-04-27 RX ADMIN — HYDROCODONE BITARTRATE AND ACETAMINOPHEN 1 TABLET: 5; 325 TABLET ORAL at 04:59

## 2020-04-27 RX ADMIN — INSULIN LISPRO 6 UNITS: 100 INJECTION, SOLUTION INTRAVENOUS; SUBCUTANEOUS at 18:16

## 2020-04-27 RX ADMIN — POTASSIUM CHLORIDE 40 MEQ: 750 CAPSULE, EXTENDED RELEASE ORAL at 18:16

## 2020-04-27 RX ADMIN — BUDESONIDE 0.5 MG: 0.5 INHALANT RESPIRATORY (INHALATION) at 20:03

## 2020-04-27 RX ADMIN — CARVEDILOL 6.25 MG: 6.25 TABLET, FILM COATED ORAL at 18:16

## 2020-04-27 RX ADMIN — HYDROCODONE BITARTRATE AND ACETAMINOPHEN 1 TABLET: 5; 325 TABLET ORAL at 20:38

## 2020-04-27 RX ADMIN — HALOPERIDOL 5 MG: 5 TABLET ORAL at 18:16

## 2020-04-27 RX ADMIN — CHLORDIAZEPOXIDE HYDROCHLORIDE 25 MG: 25 CAPSULE ORAL at 13:07

## 2020-04-27 RX ADMIN — INSULIN LISPRO 2 UNITS: 100 INJECTION, SOLUTION INTRAVENOUS; SUBCUTANEOUS at 12:11

## 2020-04-27 RX ADMIN — IPRATROPIUM BROMIDE AND ALBUTEROL SULFATE 3 ML: 2.5; .5 SOLUTION RESPIRATORY (INHALATION) at 16:29

## 2020-04-27 RX ADMIN — ASPIRIN 81 MG: 81 TABLET ORAL at 18:20

## 2020-04-27 RX ADMIN — BUDESONIDE 0.5 MG: 0.5 INHALANT RESPIRATORY (INHALATION) at 08:04

## 2020-04-27 RX ADMIN — FUROSEMIDE 40 MG: 40 TABLET ORAL at 08:11

## 2020-04-27 RX ADMIN — ISOSORBIDE MONONITRATE 30 MG: 30 TABLET ORAL at 08:11

## 2020-04-27 RX ADMIN — LORAZEPAM 1 MG: 2 INJECTION INTRAMUSCULAR; INTRAVENOUS at 20:38

## 2020-04-27 RX ADMIN — LORAZEPAM 2 MG: 2 INJECTION INTRAMUSCULAR; INTRAVENOUS at 18:17

## 2020-04-27 RX ADMIN — CHLORDIAZEPOXIDE HYDROCHLORIDE 25 MG: 25 CAPSULE ORAL at 05:00

## 2020-04-27 RX ADMIN — IPRATROPIUM BROMIDE AND ALBUTEROL SULFATE 3 ML: 2.5; .5 SOLUTION RESPIRATORY (INHALATION) at 11:54

## 2020-04-27 RX ADMIN — CHLORDIAZEPOXIDE HYDROCHLORIDE 25 MG: 25 CAPSULE ORAL at 20:38

## 2020-04-27 RX ADMIN — SODIUM CHLORIDE, PRESERVATIVE FREE 10 ML: 5 INJECTION INTRAVENOUS at 08:49

## 2020-04-27 RX ADMIN — LORAZEPAM 2 MG: 2 INJECTION INTRAMUSCULAR; INTRAVENOUS at 14:26

## 2020-04-27 RX ADMIN — LORAZEPAM 2 MG: 2 INJECTION INTRAMUSCULAR; INTRAVENOUS at 11:39

## 2020-04-28 PROBLEM — J18.9 PNA (PNEUMONIA): Status: RESOLVED | Noted: 2020-04-22 | Resolved: 2020-04-28

## 2020-04-28 PROBLEM — E87.20 LACTIC ACIDOSIS: Status: RESOLVED | Noted: 2020-04-21 | Resolved: 2020-04-28

## 2020-04-28 PROBLEM — E87.6 HYPOKALEMIA: Status: RESOLVED | Noted: 2020-04-21 | Resolved: 2020-04-28

## 2020-04-28 PROBLEM — J96.01 ACUTE HYPOXEMIC RESPIRATORY FAILURE (HCC): Status: RESOLVED | Noted: 2020-04-21 | Resolved: 2020-04-28

## 2020-04-28 PROBLEM — G93.41 ACUTE METABOLIC ENCEPHALOPATHY: Status: RESOLVED | Noted: 2020-04-21 | Resolved: 2020-04-28

## 2020-04-28 LAB
ANION GAP SERPL CALCULATED.3IONS-SCNC: 12.1 MMOL/L (ref 5–15)
BASOPHILS # BLD AUTO: 0.01 10*3/MM3 (ref 0–0.2)
BASOPHILS NFR BLD AUTO: 0.2 % (ref 0–1.5)
BUN BLD-MCNC: 12 MG/DL (ref 8–23)
BUN/CREAT SERPL: 21.4 (ref 7–25)
CALCIUM SPEC-SCNC: 8.8 MG/DL (ref 8.6–10.5)
CHLORIDE SERPL-SCNC: 97 MMOL/L (ref 98–107)
CHOLEST SERPL-MCNC: 131 MG/DL (ref 0–200)
CO2 SERPL-SCNC: 21.9 MMOL/L (ref 22–29)
CREAT BLD-MCNC: 0.56 MG/DL (ref 0.57–1)
DEPRECATED RDW RBC AUTO: 60.3 FL (ref 37–54)
EOSINOPHIL # BLD AUTO: 0.11 10*3/MM3 (ref 0–0.4)
EOSINOPHIL NFR BLD AUTO: 2 % (ref 0.3–6.2)
ERYTHROCYTE [DISTWIDTH] IN BLOOD BY AUTOMATED COUNT: 18.6 % (ref 12.3–15.4)
GFR SERPL CREATININE-BSD FRML MDRD: 110 ML/MIN/1.73
GLUCOSE BLD-MCNC: 124 MG/DL (ref 65–99)
GLUCOSE BLDC GLUCOMTR-MCNC: 131 MG/DL (ref 70–130)
GLUCOSE BLDC GLUCOMTR-MCNC: 146 MG/DL (ref 70–130)
GLUCOSE BLDC GLUCOMTR-MCNC: 261 MG/DL (ref 70–130)
GLUCOSE BLDC GLUCOMTR-MCNC: 270 MG/DL (ref 70–130)
HCT VFR BLD AUTO: 32.8 % (ref 34–46.6)
HDLC SERPL-MCNC: 46 MG/DL (ref 40–60)
HGB BLD-MCNC: 10.9 G/DL (ref 12–15.9)
IMM GRANULOCYTES # BLD AUTO: 0.05 10*3/MM3 (ref 0–0.05)
IMM GRANULOCYTES NFR BLD AUTO: 0.9 % (ref 0–0.5)
LDLC SERPL CALC-MCNC: 63 MG/DL (ref 0–100)
LDLC/HDLC SERPL: 1.37 {RATIO}
LYMPHOCYTES # BLD AUTO: 1.04 10*3/MM3 (ref 0.7–3.1)
LYMPHOCYTES NFR BLD AUTO: 19.1 % (ref 19.6–45.3)
MCH RBC QN AUTO: 29.8 PG (ref 26.6–33)
MCHC RBC AUTO-ENTMCNC: 33.2 G/DL (ref 31.5–35.7)
MCV RBC AUTO: 89.6 FL (ref 79–97)
MONOCYTES # BLD AUTO: 0.37 10*3/MM3 (ref 0.1–0.9)
MONOCYTES NFR BLD AUTO: 6.8 % (ref 5–12)
NEUTROPHILS # BLD AUTO: 3.87 10*3/MM3 (ref 1.7–7)
NEUTROPHILS NFR BLD AUTO: 71 % (ref 42.7–76)
NRBC BLD AUTO-RTO: 0 /100 WBC (ref 0–0.2)
PLATELET # BLD AUTO: 198 10*3/MM3 (ref 140–450)
PMV BLD AUTO: 11.7 FL (ref 6–12)
POTASSIUM BLD-SCNC: 4.4 MMOL/L (ref 3.5–5.2)
RBC # BLD AUTO: 3.66 10*6/MM3 (ref 3.77–5.28)
SODIUM BLD-SCNC: 131 MMOL/L (ref 136–145)
TRIGL SERPL-MCNC: 109 MG/DL (ref 0–150)
VLDLC SERPL-MCNC: 21.8 MG/DL (ref 5–40)
WBC NRBC COR # BLD: 5.45 10*3/MM3 (ref 3.4–10.8)

## 2020-04-28 PROCEDURE — 63710000001 PREDNISONE PER 5 MG: Performed by: INTERNAL MEDICINE

## 2020-04-28 PROCEDURE — 25010000002 LORAZEPAM PER 2 MG: Performed by: INTERNAL MEDICINE

## 2020-04-28 PROCEDURE — 99232 SBSQ HOSP IP/OBS MODERATE 35: CPT | Performed by: INTERNAL MEDICINE

## 2020-04-28 PROCEDURE — 80048 BASIC METABOLIC PNL TOTAL CA: CPT | Performed by: HOSPITALIST

## 2020-04-28 PROCEDURE — 82962 GLUCOSE BLOOD TEST: CPT

## 2020-04-28 PROCEDURE — 94799 UNLISTED PULMONARY SVC/PX: CPT

## 2020-04-28 PROCEDURE — 80061 LIPID PANEL: CPT | Performed by: INTERNAL MEDICINE

## 2020-04-28 PROCEDURE — 25010000002 ONDANSETRON PER 1 MG: Performed by: INTERNAL MEDICINE

## 2020-04-28 PROCEDURE — 63710000001 INSULIN LISPRO (HUMAN) PER 5 UNITS: Performed by: HOSPITALIST

## 2020-04-28 PROCEDURE — 97110 THERAPEUTIC EXERCISES: CPT

## 2020-04-28 PROCEDURE — 97535 SELF CARE MNGMENT TRAINING: CPT | Performed by: OCCUPATIONAL THERAPIST

## 2020-04-28 PROCEDURE — 63710000001 INSULIN GLARGINE PER 5 UNITS: Performed by: INTERNAL MEDICINE

## 2020-04-28 PROCEDURE — 63710000001 PREDNISONE PER 1 MG: Performed by: INTERNAL MEDICINE

## 2020-04-28 PROCEDURE — 85025 COMPLETE CBC W/AUTO DIFF WBC: CPT | Performed by: HOSPITALIST

## 2020-04-28 PROCEDURE — 97162 PT EVAL MOD COMPLEX 30 MIN: CPT

## 2020-04-28 RX ORDER — POTASSIUM CHLORIDE 7.45 MG/ML
10 INJECTION INTRAVENOUS
Status: CANCELLED | OUTPATIENT
Start: 2020-04-28

## 2020-04-28 RX ORDER — BUDESONIDE 0.5 MG/2ML
0.5 INHALANT ORAL
Status: CANCELLED | OUTPATIENT
Start: 2020-04-28

## 2020-04-28 RX ORDER — INSULIN GLARGINE 100 [IU]/ML
15 INJECTION, SOLUTION SUBCUTANEOUS DAILY
Refills: 12 | Status: CANCELLED | OUTPATIENT
Start: 2020-04-29

## 2020-04-28 RX ORDER — HALOPERIDOL 5 MG/ML
2 INJECTION INTRAMUSCULAR
Status: CANCELLED | OUTPATIENT
Start: 2020-04-28

## 2020-04-28 RX ORDER — DEXTROSE MONOHYDRATE 25 G/50ML
25 INJECTION, SOLUTION INTRAVENOUS
Status: CANCELLED | OUTPATIENT
Start: 2020-04-28

## 2020-04-28 RX ORDER — BISACODYL 5 MG/1
5 TABLET, DELAYED RELEASE ORAL DAILY PRN
Status: CANCELLED | OUTPATIENT
Start: 2020-04-28

## 2020-04-28 RX ORDER — FUROSEMIDE 20 MG/1
20 TABLET ORAL DAILY
Status: DISCONTINUED | OUTPATIENT
Start: 2020-04-29 | End: 2020-05-01

## 2020-04-28 RX ORDER — POTASSIUM CHLORIDE 750 MG/1
10 CAPSULE, EXTENDED RELEASE ORAL DAILY
Status: DISCONTINUED | OUTPATIENT
Start: 2020-04-28 | End: 2020-04-30

## 2020-04-28 RX ORDER — NICOTINE POLACRILEX 4 MG
15 LOZENGE BUCCAL
Status: CANCELLED | OUTPATIENT
Start: 2020-04-28

## 2020-04-28 RX ORDER — IPRATROPIUM BROMIDE AND ALBUTEROL SULFATE 2.5; .5 MG/3ML; MG/3ML
3 SOLUTION RESPIRATORY (INHALATION) EVERY 4 HOURS PRN
Qty: 360 ML | Status: CANCELLED | OUTPATIENT
Start: 2020-04-28

## 2020-04-28 RX ORDER — GUAIFENESIN 600 MG/1
600 TABLET, EXTENDED RELEASE ORAL EVERY 12 HOURS SCHEDULED
Status: DISCONTINUED | OUTPATIENT
Start: 2020-04-28 | End: 2020-05-01 | Stop reason: HOSPADM

## 2020-04-28 RX ORDER — LOPERAMIDE HYDROCHLORIDE 2 MG/1
2 CAPSULE ORAL 4 TIMES DAILY PRN
Status: CANCELLED | OUTPATIENT
Start: 2020-04-28

## 2020-04-28 RX ORDER — IPRATROPIUM BROMIDE AND ALBUTEROL SULFATE 2.5; .5 MG/3ML; MG/3ML
3 SOLUTION RESPIRATORY (INHALATION)
Qty: 360 ML | Status: CANCELLED | OUTPATIENT
Start: 2020-04-28

## 2020-04-28 RX ORDER — ONDANSETRON 4 MG/1
4 TABLET, FILM COATED ORAL EVERY 6 HOURS PRN
Status: CANCELLED | OUTPATIENT
Start: 2020-04-28

## 2020-04-28 RX ORDER — DIAZEPAM 5 MG/1
5 TABLET ORAL EVERY 12 HOURS PRN
Status: DISCONTINUED | OUTPATIENT
Start: 2020-04-28 | End: 2020-05-01 | Stop reason: HOSPADM

## 2020-04-28 RX ORDER — ACETAMINOPHEN 325 MG/1
650 TABLET ORAL EVERY 4 HOURS PRN
Status: CANCELLED | OUTPATIENT
Start: 2020-04-28

## 2020-04-28 RX ORDER — CALCIUM CARBONATE 200(500)MG
2 TABLET,CHEWABLE ORAL 2 TIMES DAILY PRN
Status: CANCELLED | OUTPATIENT
Start: 2020-04-28

## 2020-04-28 RX ORDER — FUROSEMIDE 40 MG/1
40 TABLET ORAL DAILY
Status: CANCELLED | OUTPATIENT
Start: 2020-04-29

## 2020-04-28 RX ORDER — HYDROCODONE BITARTRATE AND ACETAMINOPHEN 5; 325 MG/1; MG/1
1 TABLET ORAL EVERY 6 HOURS PRN
Status: CANCELLED | OUTPATIENT
Start: 2020-04-28 | End: 2020-05-04

## 2020-04-28 RX ADMIN — POTASSIUM CHLORIDE 10 MEQ: 750 CAPSULE, EXTENDED RELEASE ORAL at 15:29

## 2020-04-28 RX ADMIN — ONDANSETRON 4 MG: 2 INJECTION INTRAMUSCULAR; INTRAVENOUS at 12:38

## 2020-04-28 RX ADMIN — ASPIRIN 81 MG: 81 TABLET ORAL at 10:37

## 2020-04-28 RX ADMIN — GUAIFENESIN 600 MG: 600 TABLET, EXTENDED RELEASE ORAL at 18:04

## 2020-04-28 RX ADMIN — POTASSIUM CHLORIDE 40 MEQ: 750 CAPSULE, EXTENDED RELEASE ORAL at 10:36

## 2020-04-28 RX ADMIN — FUROSEMIDE 40 MG: 40 TABLET ORAL at 10:37

## 2020-04-28 RX ADMIN — LOSARTAN POTASSIUM 50 MG: 50 TABLET, FILM COATED ORAL at 10:37

## 2020-04-28 RX ADMIN — CHLORDIAZEPOXIDE HYDROCHLORIDE 25 MG: 25 CAPSULE ORAL at 05:09

## 2020-04-28 RX ADMIN — ATORVASTATIN CALCIUM 10 MG: 20 TABLET, FILM COATED ORAL at 19:34

## 2020-04-28 RX ADMIN — PREDNISONE 30 MG: 20 TABLET ORAL at 10:36

## 2020-04-28 RX ADMIN — BUDESONIDE 0.5 MG: 0.5 INHALANT RESPIRATORY (INHALATION) at 10:55

## 2020-04-28 RX ADMIN — LORAZEPAM 1 MG: 2 INJECTION INTRAMUSCULAR; INTRAVENOUS at 10:35

## 2020-04-28 RX ADMIN — INSULIN LISPRO 4 UNITS: 100 INJECTION, SOLUTION INTRAVENOUS; SUBCUTANEOUS at 18:09

## 2020-04-28 RX ADMIN — IPRATROPIUM BROMIDE AND ALBUTEROL SULFATE 3 ML: 2.5; .5 SOLUTION RESPIRATORY (INHALATION) at 17:19

## 2020-04-28 RX ADMIN — DIAZEPAM 5 MG: 5 TABLET ORAL at 19:34

## 2020-04-28 RX ADMIN — LORAZEPAM 1 MG: 1 TABLET ORAL at 03:08

## 2020-04-28 RX ADMIN — SODIUM CHLORIDE, PRESERVATIVE FREE 10 ML: 5 INJECTION INTRAVENOUS at 12:39

## 2020-04-28 RX ADMIN — IPRATROPIUM BROMIDE AND ALBUTEROL SULFATE 3 ML: 2.5; .5 SOLUTION RESPIRATORY (INHALATION) at 10:56

## 2020-04-28 RX ADMIN — CARVEDILOL 6.25 MG: 6.25 TABLET, FILM COATED ORAL at 10:36

## 2020-04-28 RX ADMIN — HYDROCODONE BITARTRATE AND ACETAMINOPHEN 1 TABLET: 5; 325 TABLET ORAL at 03:08

## 2020-04-28 RX ADMIN — GUAIFENESIN 600 MG: 600 TABLET, EXTENDED RELEASE ORAL at 19:34

## 2020-04-28 RX ADMIN — IPRATROPIUM BROMIDE AND ALBUTEROL SULFATE 3 ML: 2.5; .5 SOLUTION RESPIRATORY (INHALATION) at 13:33

## 2020-04-28 RX ADMIN — CARVEDILOL 6.25 MG: 6.25 TABLET, FILM COATED ORAL at 18:07

## 2020-04-28 RX ADMIN — NICOTINE 1 PATCH: 21 PATCH, EXTENDED RELEASE TRANSDERMAL at 15:28

## 2020-04-28 RX ADMIN — INSULIN GLARGINE 15 UNITS: 100 INJECTION, SOLUTION SUBCUTANEOUS at 10:35

## 2020-04-28 RX ADMIN — ISOSORBIDE MONONITRATE 30 MG: 30 TABLET ORAL at 10:36

## 2020-04-29 LAB
GLUCOSE BLDC GLUCOMTR-MCNC: 107 MG/DL (ref 70–130)
GLUCOSE BLDC GLUCOMTR-MCNC: 138 MG/DL (ref 70–130)
GLUCOSE BLDC GLUCOMTR-MCNC: 143 MG/DL (ref 70–130)
GLUCOSE BLDC GLUCOMTR-MCNC: 158 MG/DL (ref 70–130)

## 2020-04-29 PROCEDURE — 99232 SBSQ HOSP IP/OBS MODERATE 35: CPT | Performed by: INTERNAL MEDICINE

## 2020-04-29 PROCEDURE — 94799 UNLISTED PULMONARY SVC/PX: CPT

## 2020-04-29 PROCEDURE — 97116 GAIT TRAINING THERAPY: CPT

## 2020-04-29 PROCEDURE — 63710000001 INSULIN GLARGINE PER 5 UNITS: Performed by: INTERNAL MEDICINE

## 2020-04-29 PROCEDURE — 63710000001 INSULIN LISPRO (HUMAN) PER 5 UNITS: Performed by: HOSPITALIST

## 2020-04-29 PROCEDURE — 82962 GLUCOSE BLOOD TEST: CPT

## 2020-04-29 RX ORDER — CARVEDILOL 6.25 MG/1
6.25 TABLET ORAL 2 TIMES DAILY WITH MEALS
Qty: 60 TABLET | Refills: 0 | Status: SHIPPED | OUTPATIENT
Start: 2020-04-29

## 2020-04-29 RX ORDER — GUAIFENESIN 600 MG/1
600 TABLET, EXTENDED RELEASE ORAL EVERY 12 HOURS SCHEDULED
Qty: 30 TABLET | Refills: 0 | Status: SHIPPED | OUTPATIENT
Start: 2020-04-29

## 2020-04-29 RX ORDER — ATORVASTATIN CALCIUM 10 MG/1
10 TABLET, FILM COATED ORAL NIGHTLY
Qty: 30 TABLET | Refills: 0 | Status: SHIPPED | OUTPATIENT
Start: 2020-04-29 | End: 2020-05-29 | Stop reason: HOSPADM

## 2020-04-29 RX ORDER — DIAZEPAM 5 MG/1
5 TABLET ORAL EVERY 12 HOURS PRN
Qty: 30 TABLET | Refills: 0 | Status: SHIPPED | OUTPATIENT
Start: 2020-04-29 | End: 2020-04-29

## 2020-04-29 RX ORDER — LOSARTAN POTASSIUM 50 MG/1
50 TABLET ORAL DAILY
Qty: 30 TABLET | Refills: 0 | Status: SHIPPED | OUTPATIENT
Start: 2020-04-29

## 2020-04-29 RX ORDER — IPRATROPIUM BROMIDE AND ALBUTEROL SULFATE 2.5; .5 MG/3ML; MG/3ML
3 SOLUTION RESPIRATORY (INHALATION) EVERY 4 HOURS PRN
Qty: 360 ML
Start: 2020-04-29 | End: 2020-05-01 | Stop reason: HOSPADM

## 2020-04-29 RX ORDER — FUROSEMIDE 20 MG/1
20 TABLET ORAL DAILY
Start: 2020-04-30 | End: 2020-05-29 | Stop reason: HOSPADM

## 2020-04-29 RX ORDER — DIAZEPAM 5 MG/1
5 TABLET ORAL EVERY 12 HOURS PRN
Qty: 6 TABLET | Refills: 0 | Status: SHIPPED | OUTPATIENT
Start: 2020-04-29 | End: 2020-05-01

## 2020-04-29 RX ORDER — ASPIRIN 81 MG/1
81 TABLET ORAL DAILY
Qty: 30 TABLET | Refills: 0 | Status: SHIPPED | OUTPATIENT
Start: 2020-04-29

## 2020-04-29 RX ORDER — ALBUTEROL SULFATE 2.5 MG/3ML
2.5 SOLUTION RESPIRATORY (INHALATION) EVERY 4 HOURS PRN
Refills: 12
Start: 2020-04-29 | End: 2020-05-14 | Stop reason: SDUPTHER

## 2020-04-29 RX ORDER — ISOSORBIDE MONONITRATE 30 MG/1
30 TABLET, EXTENDED RELEASE ORAL
Qty: 30 TABLET | Refills: 0 | Status: SHIPPED | OUTPATIENT
Start: 2020-04-29

## 2020-04-29 RX ORDER — BUDESONIDE 0.5 MG/2ML
0.5 INHALANT ORAL
Start: 2020-04-29

## 2020-04-29 RX ORDER — POTASSIUM CHLORIDE 750 MG/1
10 TABLET, FILM COATED, EXTENDED RELEASE ORAL DAILY
Qty: 30 TABLET | Refills: 0 | Status: SHIPPED | OUTPATIENT
Start: 2020-04-29 | End: 2020-05-01 | Stop reason: HOSPADM

## 2020-04-29 RX ADMIN — ISOSORBIDE MONONITRATE 30 MG: 30 TABLET ORAL at 08:12

## 2020-04-29 RX ADMIN — GUAIFENESIN 600 MG: 600 TABLET, EXTENDED RELEASE ORAL at 20:46

## 2020-04-29 RX ADMIN — IPRATROPIUM BROMIDE AND ALBUTEROL SULFATE 3 ML: 2.5; .5 SOLUTION RESPIRATORY (INHALATION) at 06:57

## 2020-04-29 RX ADMIN — IPRATROPIUM BROMIDE AND ALBUTEROL SULFATE 3 ML: 2.5; .5 SOLUTION RESPIRATORY (INHALATION) at 15:15

## 2020-04-29 RX ADMIN — NICOTINE 1 PATCH: 21 PATCH, EXTENDED RELEASE TRANSDERMAL at 14:45

## 2020-04-29 RX ADMIN — CARVEDILOL 6.25 MG: 6.25 TABLET, FILM COATED ORAL at 17:02

## 2020-04-29 RX ADMIN — ATORVASTATIN CALCIUM 10 MG: 20 TABLET, FILM COATED ORAL at 20:46

## 2020-04-29 RX ADMIN — FUROSEMIDE 20 MG: 20 TABLET ORAL at 08:12

## 2020-04-29 RX ADMIN — GUAIFENESIN 600 MG: 600 TABLET, EXTENDED RELEASE ORAL at 08:12

## 2020-04-29 RX ADMIN — INSULIN GLARGINE 15 UNITS: 100 INJECTION, SOLUTION SUBCUTANEOUS at 08:15

## 2020-04-29 RX ADMIN — LOSARTAN POTASSIUM 50 MG: 50 TABLET, FILM COATED ORAL at 08:12

## 2020-04-29 RX ADMIN — BUDESONIDE 0.5 MG: 0.5 INHALANT RESPIRATORY (INHALATION) at 20:26

## 2020-04-29 RX ADMIN — SODIUM CHLORIDE, PRESERVATIVE FREE 10 ML: 5 INJECTION INTRAVENOUS at 08:12

## 2020-04-29 RX ADMIN — DIAZEPAM 5 MG: 5 TABLET ORAL at 08:12

## 2020-04-29 RX ADMIN — SODIUM CHLORIDE, PRESERVATIVE FREE 10 ML: 5 INJECTION INTRAVENOUS at 23:02

## 2020-04-29 RX ADMIN — IPRATROPIUM BROMIDE AND ALBUTEROL SULFATE 3 ML: 2.5; .5 SOLUTION RESPIRATORY (INHALATION) at 20:26

## 2020-04-29 RX ADMIN — IPRATROPIUM BROMIDE AND ALBUTEROL SULFATE 3 ML: 2.5; .5 SOLUTION RESPIRATORY (INHALATION) at 11:18

## 2020-04-29 RX ADMIN — ACETAMINOPHEN 650 MG: 325 TABLET, FILM COATED ORAL at 20:46

## 2020-04-29 RX ADMIN — CARVEDILOL 6.25 MG: 6.25 TABLET, FILM COATED ORAL at 08:12

## 2020-04-29 RX ADMIN — ASPIRIN 81 MG: 81 TABLET ORAL at 08:12

## 2020-04-29 RX ADMIN — POTASSIUM CHLORIDE 10 MEQ: 750 CAPSULE, EXTENDED RELEASE ORAL at 08:12

## 2020-04-29 RX ADMIN — INSULIN LISPRO 2 UNITS: 100 INJECTION, SOLUTION INTRAVENOUS; SUBCUTANEOUS at 17:02

## 2020-04-29 RX ADMIN — BUDESONIDE 0.5 MG: 0.5 INHALANT RESPIRATORY (INHALATION) at 06:57

## 2020-04-30 LAB
ANION GAP SERPL CALCULATED.3IONS-SCNC: 15.2 MMOL/L (ref 5–15)
BUN BLD-MCNC: 12 MG/DL (ref 8–23)
BUN/CREAT SERPL: 16.7 (ref 7–25)
CALCIUM SPEC-SCNC: 8.8 MG/DL (ref 8.6–10.5)
CHLORIDE SERPL-SCNC: 96 MMOL/L (ref 98–107)
CO2 SERPL-SCNC: 23.8 MMOL/L (ref 22–29)
CREAT BLD-MCNC: 0.72 MG/DL (ref 0.57–1)
GFR SERPL CREATININE-BSD FRML MDRD: 82 ML/MIN/1.73
GLUCOSE BLD-MCNC: 143 MG/DL (ref 65–99)
GLUCOSE BLDC GLUCOMTR-MCNC: 130 MG/DL (ref 70–130)
GLUCOSE BLDC GLUCOMTR-MCNC: 139 MG/DL (ref 70–130)
GLUCOSE BLDC GLUCOMTR-MCNC: 143 MG/DL (ref 70–130)
GLUCOSE BLDC GLUCOMTR-MCNC: 160 MG/DL (ref 70–130)
POTASSIUM BLD-SCNC: 3.6 MMOL/L (ref 3.5–5.2)
SODIUM BLD-SCNC: 135 MMOL/L (ref 136–145)

## 2020-04-30 PROCEDURE — 82962 GLUCOSE BLOOD TEST: CPT

## 2020-04-30 PROCEDURE — 80048 BASIC METABOLIC PNL TOTAL CA: CPT | Performed by: INTERNAL MEDICINE

## 2020-04-30 PROCEDURE — 99232 SBSQ HOSP IP/OBS MODERATE 35: CPT | Performed by: INTERNAL MEDICINE

## 2020-04-30 PROCEDURE — 97535 SELF CARE MNGMENT TRAINING: CPT | Performed by: OCCUPATIONAL THERAPIST

## 2020-04-30 PROCEDURE — 94799 UNLISTED PULMONARY SVC/PX: CPT

## 2020-04-30 PROCEDURE — 63710000001 INSULIN GLARGINE PER 5 UNITS: Performed by: INTERNAL MEDICINE

## 2020-04-30 RX ORDER — LIDOCAINE 50 MG/G
2 PATCH TOPICAL
Status: DISCONTINUED | OUTPATIENT
Start: 2020-05-01 | End: 2020-05-01 | Stop reason: HOSPADM

## 2020-04-30 RX ORDER — POTASSIUM CHLORIDE 750 MG/1
20 CAPSULE, EXTENDED RELEASE ORAL DAILY
Status: DISCONTINUED | OUTPATIENT
Start: 2020-05-01 | End: 2020-05-01 | Stop reason: HOSPADM

## 2020-04-30 RX ADMIN — GUAIFENESIN 600 MG: 600 TABLET, EXTENDED RELEASE ORAL at 20:24

## 2020-04-30 RX ADMIN — DIAZEPAM 5 MG: 5 TABLET ORAL at 20:24

## 2020-04-30 RX ADMIN — LOSARTAN POTASSIUM 50 MG: 50 TABLET, FILM COATED ORAL at 08:25

## 2020-04-30 RX ADMIN — CARVEDILOL 6.25 MG: 6.25 TABLET, FILM COATED ORAL at 08:25

## 2020-04-30 RX ADMIN — ISOSORBIDE MONONITRATE 30 MG: 30 TABLET ORAL at 08:25

## 2020-04-30 RX ADMIN — DIAZEPAM 5 MG: 5 TABLET ORAL at 08:31

## 2020-04-30 RX ADMIN — POTASSIUM CHLORIDE 10 MEQ: 750 CAPSULE, EXTENDED RELEASE ORAL at 08:24

## 2020-04-30 RX ADMIN — NICOTINE 1 PATCH: 21 PATCH, EXTENDED RELEASE TRANSDERMAL at 17:28

## 2020-04-30 RX ADMIN — GUAIFENESIN 600 MG: 600 TABLET, EXTENDED RELEASE ORAL at 08:25

## 2020-04-30 RX ADMIN — INSULIN GLARGINE 15 UNITS: 100 INJECTION, SOLUTION SUBCUTANEOUS at 08:32

## 2020-04-30 RX ADMIN — FUROSEMIDE 20 MG: 20 TABLET ORAL at 08:24

## 2020-04-30 RX ADMIN — BUDESONIDE 0.5 MG: 0.5 INHALANT RESPIRATORY (INHALATION) at 21:01

## 2020-04-30 RX ADMIN — IPRATROPIUM BROMIDE AND ALBUTEROL SULFATE 3 ML: 2.5; .5 SOLUTION RESPIRATORY (INHALATION) at 15:45

## 2020-04-30 RX ADMIN — ASPIRIN 81 MG: 81 TABLET ORAL at 08:25

## 2020-04-30 RX ADMIN — IPRATROPIUM BROMIDE AND ALBUTEROL SULFATE 3 ML: 2.5; .5 SOLUTION RESPIRATORY (INHALATION) at 21:01

## 2020-04-30 RX ADMIN — ACETAMINOPHEN 650 MG: 325 TABLET, FILM COATED ORAL at 13:18

## 2020-04-30 RX ADMIN — BUDESONIDE 0.5 MG: 0.5 INHALANT RESPIRATORY (INHALATION) at 07:16

## 2020-04-30 RX ADMIN — ATORVASTATIN CALCIUM 10 MG: 20 TABLET, FILM COATED ORAL at 20:24

## 2020-04-30 RX ADMIN — IPRATROPIUM BROMIDE AND ALBUTEROL SULFATE 3 ML: 2.5; .5 SOLUTION RESPIRATORY (INHALATION) at 07:16

## 2020-05-01 ENCOUNTER — READMISSION MANAGEMENT (OUTPATIENT)
Dept: CALL CENTER | Facility: HOSPITAL | Age: 60
End: 2020-05-01

## 2020-05-01 VITALS
WEIGHT: 186.95 LBS | TEMPERATURE: 98.8 F | BODY MASS INDEX: 28.33 KG/M2 | OXYGEN SATURATION: 96 % | SYSTOLIC BLOOD PRESSURE: 130 MMHG | RESPIRATION RATE: 18 BRPM | HEART RATE: 114 BPM | DIASTOLIC BLOOD PRESSURE: 77 MMHG | HEIGHT: 68 IN

## 2020-05-01 PROBLEM — R45.851 SUICIDAL IDEATIONS: Status: RESOLVED | Noted: 2020-05-01 | Resolved: 2020-05-01

## 2020-05-01 PROBLEM — R45.851 SUICIDAL IDEATIONS: Status: ACTIVE | Noted: 2020-05-01

## 2020-05-01 LAB
GLUCOSE BLDC GLUCOMTR-MCNC: 117 MG/DL (ref 70–130)
GLUCOSE BLDC GLUCOMTR-MCNC: 142 MG/DL (ref 70–130)
GLUCOSE BLDC GLUCOMTR-MCNC: 149 MG/DL (ref 70–130)

## 2020-05-01 PROCEDURE — 99232 SBSQ HOSP IP/OBS MODERATE 35: CPT | Performed by: INTERNAL MEDICINE

## 2020-05-01 PROCEDURE — 82962 GLUCOSE BLOOD TEST: CPT

## 2020-05-01 PROCEDURE — 63710000001 INSULIN GLARGINE PER 5 UNITS: Performed by: INTERNAL MEDICINE

## 2020-05-01 PROCEDURE — 94799 UNLISTED PULMONARY SVC/PX: CPT

## 2020-05-01 PROCEDURE — 97110 THERAPEUTIC EXERCISES: CPT

## 2020-05-01 PROCEDURE — 63710000001 ONDANSETRON PER 8 MG: Performed by: INTERNAL MEDICINE

## 2020-05-01 RX ORDER — DIAZEPAM 5 MG/1
5 TABLET ORAL EVERY 12 HOURS PRN
Qty: 6 TABLET | Refills: 0 | Status: SHIPPED | OUTPATIENT
Start: 2020-05-01 | End: 2020-05-04

## 2020-05-01 RX ORDER — POTASSIUM CHLORIDE 750 MG/1
20 CAPSULE, EXTENDED RELEASE ORAL DAILY
Qty: 10 CAPSULE | Refills: 0 | Status: ON HOLD | OUTPATIENT
Start: 2020-05-02 | End: 2020-05-29 | Stop reason: SDUPTHER

## 2020-05-01 RX ORDER — LOSARTAN POTASSIUM 25 MG/1
25 TABLET ORAL DAILY
Status: DISCONTINUED | OUTPATIENT
Start: 2020-05-02 | End: 2020-05-01 | Stop reason: HOSPADM

## 2020-05-01 RX ADMIN — ASPIRIN 81 MG: 81 TABLET ORAL at 09:08

## 2020-05-01 RX ADMIN — INSULIN GLARGINE 15 UNITS: 100 INJECTION, SOLUTION SUBCUTANEOUS at 09:12

## 2020-05-01 RX ADMIN — LIDOCAINE 2 PATCH: 50 PATCH TOPICAL at 00:18

## 2020-05-01 RX ADMIN — POTASSIUM CHLORIDE 20 MEQ: 750 CAPSULE, EXTENDED RELEASE ORAL at 09:07

## 2020-05-01 RX ADMIN — GUAIFENESIN 600 MG: 600 TABLET, EXTENDED RELEASE ORAL at 09:08

## 2020-05-01 RX ADMIN — DIAZEPAM 5 MG: 5 TABLET ORAL at 09:08

## 2020-05-01 RX ADMIN — ONDANSETRON HYDROCHLORIDE 4 MG: 4 TABLET, FILM COATED ORAL at 16:46

## 2020-05-01 RX ADMIN — NICOTINE 1 PATCH: 21 PATCH, EXTENDED RELEASE TRANSDERMAL at 18:16

## 2020-05-01 RX ADMIN — CARVEDILOL 6.25 MG: 6.25 TABLET, FILM COATED ORAL at 18:15

## 2020-05-01 RX ADMIN — IPRATROPIUM BROMIDE AND ALBUTEROL SULFATE 3 ML: 2.5; .5 SOLUTION RESPIRATORY (INHALATION) at 06:51

## 2020-05-01 RX ADMIN — IPRATROPIUM BROMIDE AND ALBUTEROL SULFATE 3 ML: 2.5; .5 SOLUTION RESPIRATORY (INHALATION) at 10:45

## 2020-05-01 RX ADMIN — BUDESONIDE 0.5 MG: 0.5 INHALANT RESPIRATORY (INHALATION) at 06:50

## 2020-05-01 NOTE — PROGRESS NOTES
"[facility]    Patient Name: Ada Ngo  :1960  60 y.o.  LOS: 10  Encounter Provider: Matthew Boone Jr, MD      Patient Care Team:  Trenton Cerrato Jr., DO as PCP - General (Family Medicine)    Chief Complaint: Follow-up stress-induced cardiomyopathy, alcohol withdrawal, psychiatric disorder    Interval History: No acute issues overnight       Objective   Vital Signs  Temp:  [97.8 °F (36.6 °C)-98.8 °F (37.1 °C)] 98.5 °F (36.9 °C)  Heart Rate:  [79-95] 91  Resp:  [16-18] 18  BP: ()/(46-90) 116/90    Intake/Output Summary (Last 24 hours) at 2020 1303  Last data filed at 2020 1205  Gross per 24 hour   Intake 360 ml   Output 1250 ml   Net -890 ml     Flowsheet Rows      First Filed Value   Admission Height  172.7 cm (68\") Documented at 2020   Admission Weight  79.4 kg (175 lb) Documented at 2020            Physical Exam   Constitutional: She is oriented to person, place, and time. She appears well-developed and well-nourished.   HENT:   Head: Normocephalic and atraumatic.   Eyes: Pupils are equal, round, and reactive to light. Conjunctivae are normal.   Neck: No JVD present. No thyromegaly present.   Cardiovascular: Exam reveals no gallop and no friction rub.   No murmur heard.  Pulmonary/Chest: No respiratory distress. She exhibits no tenderness.   Abdominal: Bowel sounds are normal. She exhibits no distension.   Musculoskeletal: She exhibits no edema or tenderness.   Neurological: She is alert and oriented to person, place, and time.   Skin: No rash noted. No erythema.   Psychiatric: She has a normal mood and affect. Judgment normal.   Vitals reviewed.        Pertinent Test Results:  Results from last 7 days   Lab Units 20  0931 20  0510 20  0310 20  0356 20  0518   SODIUM mmol/L 135* 131* 138 134* 133*   POTASSIUM mmol/L 3.6 4.4 4.0 3.7 3.0*   CHLORIDE mmol/L 96* 97* 101 97* 93*   CO2 mmol/L 23.8 21.9* 23.4 24.5 " 26.2   BUN mg/dL 12 12 15 14 12   CREATININE mg/dL 0.72 0.56* 0.77 0.87 0.62   GLUCOSE mg/dL 143* 124* 201* 214* 177*   CALCIUM mg/dL 8.8 8.8 8.4* 7.8* 8.0*     Results from last 7 days   Lab Units 04/25/20  1251 04/25/20  0518   TROPONIN T ng/mL 0.065* 0.066*     Results from last 7 days   Lab Units 04/28/20  0510 04/27/20  0310 04/26/20  0606 04/25/20  0518   WBC 10*3/mm3 5.45 4.93 4.51 5.30   HEMOGLOBIN g/dL 10.9* 10.6* 9.6* 10.6*   HEMATOCRIT % 32.8* 32.1* 29.4* 32.7*   PLATELETS 10*3/mm3 198 196 137* 126*             Results from last 7 days   Lab Units 04/28/20  0510   CHOLESTEROL mg/dL 131   TRIGLYCERIDES mg/dL 109   HDL CHOL mg/dL 46         Results from last 7 days   Lab Units 04/25/20  0518   TSH uIU/mL 6.450*           Medication Review:     aspirin 81 mg Oral Daily   atorvastatin 10 mg Oral Nightly   budesonide 0.5 mg Nebulization BID - RT   carvedilol 6.25 mg Oral BID With Meals   guaiFENesin 600 mg Oral Q12H   insulin glargine 15 Units Subcutaneous Daily   insulin lispro 0-7 Units Subcutaneous TID AC   ipratropium-albuterol 3 mL Nebulization 4x Daily - RT   lidocaine 2 patch Transdermal Q24H   [START ON 5/2/2020] losartan 25 mg Oral Daily   nicotine 1 patch Transdermal Q24H   potassium chloride 20 mEq Oral Daily   sodium chloride 10 mL Intravenous Q12H             Assessment/Plan   1. Dyspnea -likely due to PNA and CHF.   2. Acute systolic heart failure - looks like stress induced but CTA 12/2019 shows coronary artery calcification.  Her EKG and troponin levels would fit with a stress-induced cardiomyopathy.  She has had some intermittent chest pain.  Dr. Escobar had a long discussion with her 4/24/20 about cardiac catheterization which she has declined to have done.  Given this, we will treat her medically.  Start aspirin and statin, continue carvedilol and losartan.  3. Alcohol withdrawal  4. Hypertension, controlled  5. Confusion, worsening, consider metabolic encephalopathy.   Neurology  following.  6. Anemia.   7. Elevated biomarkers can be considered NSTEMI type II due to heart failure.     Weight is stable she has no heart failure symptoms and she currently appears euvolemic.  Borderline blood pressure so we will hold Lasix, reduce dose of losartan and restart Coreg when blood pressure improves..  Will need echocardiogram follow-up in 3 months.  Will need cardiology follow-up 1 to 2 weeks after discharge.  She is clinically stable for discharge from cardiovascular standpoint.              Matthew Boone Jr, MD  [facility]  05/01/20  1:03 PM

## 2020-05-01 NOTE — NURSING NOTE
Pt resting quietly on approach, appropriate to late hour. Case discussed with CRUZ Preciado. Pt has been making suicidal statements, possibly as a manipulation to staff to seek more controlled substances. Based on documentation, I recommended she remain on 1:1 sitter for now. I encouraged staff to call [facility] with any changes in condition.

## 2020-05-01 NOTE — DISCHARGE SUMMARY
[facility]    Date of Discharge:  5/1/2020    PCP: Trenton Cerrato Jr., DO    Discharge Diagnosis:   Active Hospital Problems    Diagnosis  POA   • **Alcoholism /alcohol abuse (CMS/HCC) [F10.20]  Yes   • Transaminitis [R74.0]  Unknown   • Benzodiazepine abuse (CMS/HCC) [F13.10]  Unknown   • Generalized anxiety disorder [F41.1]  Yes   • Chronic midline low back pain without sciatica [M54.5, G89.29]  Yes   • Essential hypertension [I10]  Yes   • Sarcoidosis [D86.9]  Yes   • Benzodiazepine dependence (CMS/HCC) [F13.20]  Yes      Resolved Hospital Problems    Diagnosis Date Resolved POA   • Suicidal ideations [R45.851] 05/01/2020 Not Applicable   • PNA (pneumonia) [J18.9] 04/28/2020 Unknown   • Acute metabolic encephalopathy [G93.41] 04/28/2020 Yes   • Hypokalemia [E87.6] 04/28/2020 Unknown   • Lactic acidosis [E87.2] 04/28/2020 Unknown   • Acute hypoxemic respiratory failure (CMS/HCC) [J96.01] 04/28/2020 Unknown     Procedures Performed       Consults     Date and Time Order Name Status Description    4/30/2020 1527 Inpatient Psychiatrist Consult Completed     4/26/2020 1034 Inpatient Neurology Consult General Completed     4/24/2020 0443 Inpatient Cardiology Consult Completed     4/23/2020 0804 Inpatient Infectious Diseases Consult Completed     4/21/2020 1548 Inpatient Pulmonology Consult Completed     4/21/2020 0340 [facility] (on-call MD unless specified) Details Completed         Hospital Course  Please see history and physical for details. Patient is a 60 y.o. female who was initially admitted with complaints of dyspnea. She was initially started on IV antibiotics and was seen in consultation by Pulmonology, Neurology, ID, Cardiology at admission and Psychiatry at the end. To summarize medical noncompliance, and alcohol/benzo/opiate abuse are two major factors fueling her current disposition. She likely self mediates w/ alcohol/prescription pills for  uncontrolled depression. Psychiatry saw prior to DC for SI and they promptly signed off and felt stable from their perspective for DC. They agree in regards to alcohol induced mood disorder but did not feel katarina was present. I personally feel this patient is manipulative on many levels and it in fact complicates her medical management. Her complaints of pain w/ findingsnot consistent on physical exam were noted. Regardless she has been medically stable for days. She is not in current DT's and she can go back on Valium 5mg prn bid as dosed PTA but would strongly  need to consider permanent slow taper. Steroids utilize for COPD secondary to tobacco abuse could have also been contributory and they have been DC'd for days.  Diabetic control also adequate despite recent use of steroids. Pneumonia ruled out and remained afebrile despite DC of Rocephin days prior to DC. Generalized weakness noted and this has been focus of disposition over last couple days. We have reached out to numerous rehabs including our own and she has been declined by all consulted. Recent PT notes demonstrate improvement with minimal assist and 100ft noted. Truly makes me question effort given on previous PT evaluations. Losartan has been increased to 50mg daily, lasix is 20mg daily, and cardiology plans to restart Coreg in due time and reduce ARB then. Patient continues to decline cardiac cath despite cardiology counseling in favor. ASA/statin also noted per their recs. They plan on echo in 3months and 1-2 week f/up. CCP has now helped coordination to home w/ Home Health for PT. CCP spoke to spouse who assured on 24/7 coverage/assistance at home.        Condition on Discharge: Improved.     Temp:  [97.8 °F (36.6 °C)-98.8 °F (37.1 °C)] 98.8 °F (37.1 °C)  Heart Rate:  [] 114  Resp:  [16-18] 18  BP: ()/(46-90) 130/77  Body mass index is 28.43 kg/m².    Physical Exam   Constitutional: She is oriented to person, place, and time. She  appears well-developed and well-nourished.   HENT:   Head: Normocephalic.   Eyes: Conjunctivae are normal. No scleral icterus.   Neck: No JVD present.   Cardiovascular: Normal rate and regular rhythm.   Pulmonary/Chest: Effort normal and breath sounds normal. No respiratory distress.   Abdominal: Soft. Bowel sounds are normal. She exhibits no distension. There is no tenderness.   Musculoskeletal: She exhibits no edema.   Neurological: She is alert and oriented to person, place, and time. No cranial nerve deficit.   Psychiatric:   Mood/affect/behavior definitely not normal/appropriate but likely baseline. Denies SI to me         Discharge Medications      New Medications      Instructions Start Date   albuterol (2.5 MG/3ML) 0.083% nebulizer solution  Commonly known as:  PROVENTIL   2.5 mg, Nebulization, Every 4 Hours PRN      aspirin 81 MG EC tablet  Replaces:  aspirin 325 MG tablet   81 mg, Oral, Daily      atorvastatin 10 MG tablet  Commonly known as:  LIPITOR   10 mg, Oral, Nightly      budesonide 0.5 MG/2ML nebulizer solution  Commonly known as:  PULMICORT   0.5 mg, Nebulization, 2 Times Daily - RT      carvedilol 6.25 MG tablet  Commonly known as:  COREG   6.25 mg, Oral, 2 Times Daily With Meals      furosemide 20 MG tablet  Commonly known as:  LASIX   20 mg, Oral, Daily      guaiFENesin 600 MG 12 hr tablet  Commonly known as:  MUCINEX   600 mg, Oral, Every 12 Hours Scheduled      isosorbide mononitrate 30 MG 24 hr tablet  Commonly known as:  IMDUR   30 mg, Oral, Every 24 Hours Scheduled      potassium chloride 10 MEQ CR capsule  Commonly known as:  MICRO-K   20 mEq, Oral, Daily   Start Date:  May 2, 2020        Changes to Medications      Instructions Start Date   diazePAM 5 MG tablet  Commonly known as:  VALIUM  What changed:  when to take this   5 mg, Oral, Every 12 Hours PRN      losartan 50 MG tablet  Commonly known as:  COZAAR  What changed:    · medication strength  · how much to take  · additional  instructions   50 mg, Oral, Daily         Continue These Medications      Instructions Start Date   fexofenadine 60 MG tablet  Commonly known as:  ALLEGRA   60 mg, Oral, Daily      Nicotine Step 3 7 MG/24HR patch  Generic drug:  nicotine   1 patch, Transdermal, Every 24 Hours Scheduled      tiotropium bromide-olodaterol 2.5-2.5 MCG/ACT aerosol solution inhaler  Commonly known as:  Stiolto Respimat   2 puffs, Inhalation, Daily         Stop These Medications    aspirin 325 MG tablet  Replaced by:  aspirin 81 MG EC tablet     diclofenac-miSOPROStol 50-0.2 MG EC tablet  Commonly known as:  ARTHROTEC 50     metoprolol tartrate 25 MG tablet  Commonly known as:  LOPRESSOR     temazepam 30 MG capsule  Commonly known as:  RESTORIL           Diet Instructions     Diet: Cardiac      Discharge Diet:  Cardiac          Additional Instructions for the Follow-ups that You Need to Schedule     Ambulatory Referral to Home Health   As directed      Face to Face Visit Date:  5/1/2020    Follow-up provider for Plan of Care?:  I treated the patient in an acute care facility and will not continue treatment after discharge.    Follow-up provider:  TRENTON OLIVIER JR. [[id number]]    Reason/Clinical Findings:  alcohol/benzo abuse w/ generalized weakness    Describe mobility limitations that make leaving home difficult:  taxing effort to leave home    Nursing/Therapeutic Services Requested:  Physical Therapy    PT orders:  Therapeutic exercise Strengthening Gait Training Transfer training Home safety assessment    Weight Bearing Status:  As Tolerated    Frequency:  1 Week 1         Discharge Follow-up with PCP   As directed       Currently Documented PCP:    Trenton Olivier Jr., DO    PCP Phone Number:    [phone]     Follow Up Details:  Please call and make appointment for 1-2 weeks follow-up with PCP         Discharge Follow-up with Specialty: cardiology; 1 Week   As directed      Specialty:  cardiology    Follow Up:  1 Week       Follow Up Details:  Please call for 1-2 weeks follow-up appointment with cardiology           Follow-up Information     Trenton Cerrato Jr., DO .    Specialties:  Family Medicine, Emergency Medicine, Urgent Care  Why:  Please call and make appointment for 1-2 weeks follow-up with PCP  Contact information:  [address]  [place] [address] [phone]             [facility] [address] .    Specialty:  Home Health Services  Contact information:  [address] [phone]               Test Results Pending at Discharge     Lee Keys MD  05/01/20  15:43    Discharge time spent greater than 30 minutes.

## 2020-05-01 NOTE — THERAPY TREATMENT NOTE
Patient Name: Ada Ngo  : 1960    MRN: [id number]                              Today's Date: 2020       Admit Date: 2020    Visit Dx:     ICD-10-CM ICD-9-CM   1. Acute metabolic encephalopathy G93.41 348.31   2. Alcoholic intoxication with complication (CMS/HCC) F10.929 305.00   3. Transaminitis R74.0 790.4   4. Hypokalemia E87.6 276.8   5. Lactic acidosis secondary to alcohol abuse E87.2 276.2   6. Acute hypoxemic respiratory failure (secondary to combination of smoking history and acute alcohol intoxication) J96.01 518.81   7. Benzodiazepine abuse (CMS/HCC) F13.10 305.40   8. Generalized weakness R53.1 780.79   9. Generalized anxiety disorder F41.1 300.02     Patient Active Problem List   Diagnosis   • Benzodiazepine dependence (CMS/HCC)   • Psychosis (CMS/HCC)   • Sarcoidosis   • Drug-seeking behavior   • Chronic midline low back pain without sciatica   • Insomnia due to other mental disorder   • Essential hypertension   • Pneumonia of left lower lobe due to infectious organism (CMS/HCC)   • Alcoholism /alcohol abuse (CMS/HCC)   • Tobacco dependence   • Generalized anxiety disorder   • Transaminitis   • Benzodiazepine abuse (CMS/HCC)   • Alcoholic intoxication with complication (CMS/HCC)   • Abnormal echocardiogram   • Cardiac LV ejection fraction of 35-39%   • Stress-induced cardiomyopathy   • Right ventricular systolic dysfunction   • Mild tricuspid regurgitation   • Concentric left ventricular hypertrophy   • Suicidal ideations     Past Medical History:   Diagnosis Date   • Anxiety    • COPD (chronic obstructive pulmonary disease) (CMS/HCC)    • Coronary artery disease    • Depression    • Hypertension    • Other insomnia 2019    Refer to sleep specialist   • Pulmonary emboli (CMS/HCC)    • Stroke (CMS/HCC)      Past Surgical History:   Procedure Laterality Date   • ABDOMINAL SURGERY     • CARDIAC CATHETERIZATION     • COSMETIC SURGERY     • OVARIAN CYST REMOVAL     • SINUS SURGERY        General Information     Row Name 05/01/20 1505          PT Evaluation Time/Intention    Document Type  therapy note (daily note)  -PC     Mode of Treatment  physical therapy  -PC     Row Name 05/01/20 1505          General Information    Barriers to Rehab  cognitive status  -PC     Row Name 05/01/20 1505          Cognitive Assessment/Intervention- PT/OT    Orientation Status (Cognition)  unable/difficult to assess  -PC     Cognitive Assessment/Intervention Comment  pt was upset at time of my arrival and difficult to reason with , initially refused to participate but nursing assistant was able to get her to change her mind  -PC       User Key  (r) = Recorded By, (t) = Taken By, (c) = Cosigned By    Initials Name Provider Type    PC Lexii Gomez PT Physical Therapist        Mobility     Row Name 05/01/20 1507          Bed Mobility Assessment/Treatment    Supine-Sit [place] (Bed Mobility)  contact guard  -PC     Row Name 05/01/20 1507          Sit-Stand Transfer    Sit-Stand [place] (Transfers)  contact guard  -PC     Assistive Device (Sit-Stand Transfers)  walker, front-wheeled  -PC     Row Name 05/01/20 1507          Gait/Stairs Assessment/Training    [place] Level (Gait)  minimum assist (75% patient effort)  -PC     Assistive Device (Gait)  walker, front-wheeled  -PC     Distance in Feet (Gait)  100 ft  -PC     Pattern (Gait)  step-through  -PC     Deviations/Abnormal Patterns (Gait)  gait speed decreased;stride length decreased  -PC     Bilateral Gait Deviations  forward flexed posture;heel strike decreased  -PC     Comment (Gait/Stairs)  very forward flexed over walker, cues for upright posture and to stay closer to walker  -PC       User Key  (r) = Recorded By, (t) = Taken By, (c) = Cosigned By    Initials Name Provider Type    PC Lexii Gomez PT Physical Therapist        Obj/Interventions    No documentation.       Goals/Plan    No documentation.       Clinical Impression     Row Name  05/01/20 1508          Pain Assessment    Additional Documentation  Pain Scale: FACES Pre/Post-Treatment (Group)  -PC     Row Name 05/01/20 1508          Pain Scale: FACES Pre/Post-Treatment    Pre/Post Treatment Pain Comment  pt reports pain in her back, hip, and head  -PC     Row Name 05/01/20 1508          Plan of Care Review    Plan of Care Reviewed With  patient  -PC     Outcome Summary  pt is progressing with mobility, able to walk farther, requiring less assist, she does fatigue and get SOA with minimal activity, quality of gait is poor with forward flexed posture, she is unsteady but no overt loss of balance  -PC     Row Name 05/01/20 1508          Positioning and Restraints    Pre-Treatment Position  in bed  -PC     Post Treatment Position  bed  -PC     In Bed  sitting EOB;call light within reach;encouraged to call for assist with sitter  -PC       User Key  (r) = Recorded By, (t) = Taken By, (c) = Cosigned By    Initials Name Provider Type    PC Lexii Gomez, PT Physical Therapist        Outcome Measures     Row Name 05/01/20 1514          How much help from another person do you currently need...    Turning from your back to your side while in flat bed without using bedrails?  4  -PC     Moving from lying on back to sitting on the side of a flat bed without bedrails?  4  -PC     Moving to and from a bed to a chair (including a wheelchair)?  3  -PC     Standing up from a chair using your arms (e.g., wheelchair, bedside chair)?  4  -PC     Climbing 3-5 steps with a railing?  2  -PC     To walk in hospital room?  3  -PC     AM-PAC 6 Clicks Score (PT)  20  -PC       User Key  (r) = Recorded By, (t) = Taken By, (c) = Cosigned By    Initials Name Provider Type    PC Lexii Gomez, PT Physical Therapist          PT Recommendation and Plan     Outcome Summary/Treatment Plan (PT)  Anticipated Discharge Disposition (PT): skilled nursing facility, home with assist, home with home health  Plan of Care Reviewed  With: patient  Outcome Summary: pt is progressing with mobility, able to walk farther, requiring less assist, she does fatigue and get SOA with minimal activity, quality of gait is poor with forward flexed posture, she is unsteady but no overt loss of balance     Time Calculation:   PT Charges     Row Name 05/01/20 1515             Time Calculation    Start Time  1449  -PC      Stop Time  1503  -PC      Time Calculation (min)  14 min  -PC      PT Received On  05/01/20  -PC      PT - Next Appointment  05/02/20  -PC        User Key  (r) = Recorded By, (t) = Taken By, (c) = Cosigned By    Initials Name Provider Type    PC Lexii Gomez, PT Physical Therapist        Therapy Charges for Today     Code Description Service Date Service Provider Modifiers Qty    [id number]  PT THER PROC EA 15 MIN 5/1/2020 Lexii Gomez, PT GP 1        ..Patient was wearing a face mask during this therapy encounter. Therapist used appropriate personal protective equipment including mask and gloves.  Mask used was standard procedure mask. Appropriate PPE was worn during the entire therapy session. Hand hygiene was completed before and after therapy session. Patient is not in enhanced droplet precautions.     PT G-Codes  Outcome Measure Options: AM-PAC 6 Clicks Daily Activity (OT)  AM-PAC 6 Clicks Score (PT): 20  AM-PAC 6 Clicks Score (OT): 18    Lexii Gomez PT  5/1/2020

## 2020-05-01 NOTE — CONSULTS
"IDENTIFYING INFORMATION: The patient is a 60-year-old white female admitted on 4/20/2020 with a blood alcohol level over 300.  She is seen by psychiatry related to some possible suicidal threats, but when seen today is demanding to this physician prescribe benzodiazepines and opiates for her.    CHIEF COMPLAINT: None given    INFORMANT: Patient and chart    RELIABILITY: Limited    HISTORY OF PRESENT ILLNESS: The patient is a 60-year-old  white female admitted on 4/20/2020 with confusion and status changes.  On admission her blood alcohol is 0.315.  The patient had reportedly made some suicidal threats around the time of admission.  She did have some significant issues with alcohol withdrawal and required PRN haloperidol on a couple of occasions.  Her symptoms of delirium have resolved.  At this point the patient is demanding that this physician been treating her for \"pain management and insomnia\".  She is denying any suicidal or homicidal action.  The patient states that she lives with her .  She is reportedly a retired physician.  She does complain of chronic back pain as well as pain related to history of sarcoidosis.  She also reports chronic anxiety and insomnia.  It is her claim that a psychiatrist from [facility] had prescribed anxiety and pain medications for her though this claim seems less than optimally credible.    PAST PSYCHIATRIC HISTORY: As above.  The patient claims a history of depression and chronic insomnia    PAST MEDICAL HISTORY: Significant for sarcoidosis and chronic back pain, hypertension and environmental allergies    MEDICATIONS:   Medications Prior to Admission   Medication Sig Dispense Refill Last Dose   • diclofenac-miSOPROStol (ARTHROTEC 50) 50-0.2 MG EC tablet Take 1 tablet by mouth 2 (Two) Times a Day. Pt  unsure of dose      • metoprolol tartrate (LOPRESSOR) 25 MG tablet Take 25 mg by mouth 2 (Two) Times a Day.  10 4/20/2020 at Unknown time   • " aspirin 325 MG tablet Take 325 mg by mouth Daily.      • [] diazePAM (VALIUM) 5 MG tablet Take 1 tablet by mouth 2 (Two) Times a Day As Needed for Anxiety for up to 30 days. 60 tablet 0 More than a month at Unknown time   • fexofenadine (ALLEGRA) 60 MG tablet Take 60 mg by mouth Daily.      • losartan (COZAAR) 25 MG tablet Take 25 mg by mouth Daily.  unsure of dose   More than a month at Unknown time   • nicotine (NICODERM CQ) 7 MG/24HR patch Place 1 patch on the skin as directed by provider daily. 14 patch 0 More than a month at Unknown time   • temazepam (RESTORIL) 30 MG capsule TAKE 1 CAPSULE BY MOUTH AT NIGHT AS NEEDED FOR SLEEP. 30 capsule 0 More than a month at Unknown time   • tiotropium bromide-olodaterol (STIOLTO RESPIMAT) 2.5-2.5 MCG/ACT aerosol solution inhaler Inhale 2 puffs by mouth daily. 4 g 0 More than a month at Unknown time         ALLERGIES: Zyprexa, cephalosporins, amoxicillin, Ambien, Augmentin    FAMILY HISTORY: Noncontributory    SOCIAL HISTORY: Patient states that she lives with her .  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  Her alcohol use history as described previously though the patient greatly minimizes her use of this substance.    MENTAL STATUS EXAM: Patient is a well-developed well-nourished white female appearing her stated age.  She is in no apparent physical distress at the time of examination.  She is awake alert and oriented in all spheres.  Her mood is generally euthymic of a bit entitled her affect congruent.  Speech is relevant and coherent.  There are no deficits memory cognition noted.  Intelligence is judged to be in the average range based on fund of knowledge, the patient is generally cooperative with interview.  She denies suicidal homicidal ideation or psychotic features.  Her judgment insight appear to be reasonably intact.  No signs of substance withdrawal noted.    ASSETS/LIABILITIES: To be assessed    DIAGNOSTIC IMPRESSION: Alcohol use  "disorder, depressive disorder unspecified, history of chronic back pain, other medical problems previously described    PLAN: I have made the patient aware that I do not do pain management.  I have informed the patient that I will include in the chart her request that she be referred for pain management.  I do not feel as though the patient is, given her history of alcohol and sedative-hypnotic abuse, a candidate for reinitiation of Valium or Restoril and would also be extremely hesitant to prescribe her any potentially abusable medications.  Upon having been informed of this, the patient angrily states \"no one here is helping me!\".  She is denying suicidal or homicidal ideation.  From a psychiatric standpoint, the patient is stable for discharge.  "

## 2020-05-01 NOTE — PLAN OF CARE
Problem: Patient Care Overview  Goal: Plan of Care Review  Outcome: Ongoing (interventions implemented as appropriate)  Note:   VSS. Sitter at bedside for suicidal ideation and suicide precautions in place. Pt has persistent c/o of back, hip and head pain. Up with assist. WCM  Goal: Individualization and Mutuality  Outcome: Ongoing (interventions implemented as appropriate)  Goal: Discharge Needs Assessment  Outcome: Ongoing (interventions implemented as appropriate)  Goal: Interprofessional Rounds/Family Conf  Outcome: Ongoing (interventions implemented as appropriate)     Problem: Fall Risk (Adult)  Goal: Absence of Fall  Outcome: Ongoing (interventions implemented as appropriate)     Problem: Pain, Chronic (Adult)  Goal: Acceptable Pain/Comfort Level and Functional Ability  Outcome: Ongoing (interventions implemented as appropriate)     Problem: Suicide Risk (Adult)  Goal: Identify Related Risk Factors and Signs and Symptoms  Outcome: Ongoing (interventions implemented as appropriate)  Goal: Strength-Based Wellness/Recovery  Outcome: Ongoing (interventions implemented as appropriate)  Goal: Physical Safety  Outcome: Ongoing (interventions implemented as appropriate)     Problem: Skin Injury Risk (Adult)  Goal: Skin Health and Integrity  Outcome: Ongoing (interventions implemented as appropriate)     Problem: Confusion, Acute (Adult)  Goal: Cognitive/Functional Impairments Minimized  Outcome: Ongoing (interventions implemented as appropriate)  Goal: Safety  Outcome: Ongoing (interventions implemented as appropriate)     Problem: Health Knowledge, Opportunity to Enhance (Adult,Obstetrics,Pediatric)  Goal: Knowledgeable about Health Subject/Topic  Outcome: Ongoing (interventions implemented as appropriate)     Problem: Alcohol Withdrawal Acute, Risk/Actual (Adult)  Goal: Signs and Symptoms of Listed Potential Problems Will be Absent, Minimized or Managed (Alcohol Withdrawal Acute, Risk/Actual)  Outcome: Ongoing  (interventions implemented as appropriate)

## 2020-05-01 NOTE — PROGRESS NOTES
"    Name: Ada Ngo ADMIT: 2020   : 1960  PCP: Trenton Cerrato Jr., DO    MRN: [id number] LOS: 10 days   AGE/SEX: 60 y.o. female  ROOM: [facility]     Subjective   Subjective   Patient appears medically stable / clinically improved & relatively comfortable without evidence of grimace or wince.  AVSS on RA.  Tolerating meals & walking with one-person, hand-held assistance to bathroom without complication.  No concerns for intake / output.    Patient now refusing rehab & wants to leave AMA 2/2 untreated generalized pain.  Also, new plan for SI.  RN states patient has plan at home for suicide by stating \"owns a gun\"; however, 'does not have possession of gun a brother currently has it' and 'knows 3 people who committed suicide secondary to untreated pain'.  Patient reports to me 'wants to relocate to another country for \"assisted suicide\".    Review of Systems   Constitutional: Negative for chills and fever.   Respiratory: Negative for cough and shortness of breath.    Cardiovascular: Negative for chest pain and leg swelling.   Genitourinary: Negative for difficulty urinating and dysuria.        Objective   Objective   Vital Signs  Temp:  [97.8 °F (36.6 °C)-98.8 °F (37.1 °C)] 98.5 °F (36.9 °C)  Heart Rate:  [79-95] 91  Resp:  [16-18] 18  BP: ()/(46-90) 116/90  SpO2:  [90 %-95 %] 95 %  on   ;   Device (Oxygen Therapy): room air  Body mass index is 28.43 kg/m².     Physical Exam   Constitutional: She is oriented to person, place, and time. No distress.   HENT:   Head: Normocephalic and atraumatic.   Eyes: Pupils are equal, round, and reactive to light. EOM are normal.   Neck: Normal range of motion. Neck supple.   Cardiovascular: Normal rate and normal heart sounds.   Pulmonary/Chest: Effort normal and breath sounds normal. No respiratory distress.   Abdominal: Soft. Bowel sounds are normal.   Musculoskeletal: She exhibits no edema or deformity.   Neurological: She is alert and oriented to person, " place, and time.   Skin: Skin is warm and dry. She is not diaphoretic.   Psychiatric: She has a normal mood and affect. Her behavior is normal. Judgment and thought content normal.       Results Review:       I reviewed the patient's new clinical results.  Results from last 7 days   Lab Units 04/28/20  0510 04/27/20  0310 04/26/20  0606 04/25/20  0518   WBC 10*3/mm3 5.45 4.93 4.51 5.30   HEMOGLOBIN g/dL 10.9* 10.6* 9.6* 10.6*   PLATELETS 10*3/mm3 198 196 137* 126*     Results from last 7 days   Lab Units 04/30/20  0931 04/28/20  0510 04/27/20 0310 04/26/20  0356   SODIUM mmol/L 135* 131* 138 134*   POTASSIUM mmol/L 3.6 4.4 4.0 3.7   CHLORIDE mmol/L 96* 97* 101 97*   CO2 mmol/L 23.8 21.9* 23.4 24.5   BUN mg/dL 12 12 15 14   CREATININE mg/dL 0.72 0.56* 0.77 0.87   GLUCOSE mg/dL 143* 124* 201* 214*   Estimated Creatinine Clearance: 92.5 mL/min (by C-G formula based on SCr of 0.72 mg/dL).    Results from last 7 days   Lab Units 04/30/20  0931 04/28/20  0510 04/27/20 0310 04/26/20  0356   CALCIUM mg/dL 8.8 8.8 8.4* 7.8*       Glucose   Date/Time Value Ref Range Status   05/01/2020 1107 117 70 - 130 mg/dL Final   05/01/2020 0650 142 (H) 70 - 130 mg/dL Final   04/30/2020 2017 160 (H) 70 - 130 mg/dL Final   04/30/2020 1616 130 70 - 130 mg/dL Final   04/30/2020 1123 143 (H) 70 - 130 mg/dL Final   04/30/2020 0823 139 (H) 70 - 130 mg/dL Final   04/29/2020 2056 107 70 - 130 mg/dL Final         aspirin 81 mg Oral Daily   atorvastatin 10 mg Oral Nightly   budesonide 0.5 mg Nebulization BID - RT   carvedilol 6.25 mg Oral BID With Meals   guaiFENesin 600 mg Oral Q12H   insulin glargine 15 Units Subcutaneous Daily   insulin lispro 0-7 Units Subcutaneous TID AC   ipratropium-albuterol 3 mL Nebulization 4x Daily - RT   lidocaine 2 patch Transdermal Q24H   [START ON 5/2/2020] losartan 25 mg Oral Daily   nicotine 1 patch Transdermal Q24H   potassium chloride 20 mEq Oral Daily   sodium chloride 10 mL Intravenous Q12H      Diet Regular;  "Cardiac; Safe Tray       Assessment/Plan     Active Hospital Problems    Diagnosis  POA   • **Alcoholism /alcohol abuse (CMS/HCC) [F10.20]  Yes   • Suicidal ideations [R45.851]  Not Applicable   • Transaminitis [R74.0]  Unknown   • Benzodiazepine abuse (CMS/HCC) [F13.10]  Unknown   • Generalized anxiety disorder [F41.1]  Yes   • Chronic midline low back pain without sciatica [M54.5, G89.29]  Yes   • Essential hypertension [I10]  Yes   • Sarcoidosis [D86.9]  Yes   • Benzodiazepine dependence (CMS/HCC) [F13.20]  Yes      Resolved Hospital Problems    Diagnosis Date Resolved POA   • PNA (pneumonia) [J18.9] 04/28/2020 Unknown   • Acute metabolic encephalopathy [G93.41] 04/28/2020 Yes   • Hypokalemia [E87.6] 04/28/2020 Unknown   • Lactic acidosis [E87.2] 04/28/2020 Unknown   • Acute hypoxemic respiratory failure (CMS/HCC) [J96.01] 04/28/2020 Unknown       60 y.o. female admitted with Alcoholism /alcohol abuse (CMS/HCC).      Suicidal Ideations / Alcoholism /alcohol abuse (CMS/HCC) /   Benzodiazepine abuse (CMS/HCC) /  Benzodiazepine dependence (CMS/HCC) /   Generalized anxiety disorder   Stable after 11 day inpatient hospital admission -- no more evidence of withdrawal   Continues to request more pain medications despite appearing clinically stable & very comfortable in bed.  Also not requiring the aid of ambulatory assist device.   Reportedly refusing PT    Noted OT note for requiring minimal assistance also without need for rest break.   *New today -- SI reported by RN & pt voices plan to \"leave country for assisted suicide\" -- Psych following, awaiting recommendations*      Sarcoidosis   95% on room air   Pulm signed off -- no objection for rehab      Chronic midline low back pain without sciatica   Continues to request more pain medications despite appearing clinically stable & very comfortable in bed.  Also not requiring the aid of ambulatory assist device.   Reportedly refusing PT    Noted OT note for requiring " minimal assistance also without need for rest break.      Essential hypertension   Acceptable BP   Coreg      Transaminitis   Stable    ·   NSTEMI type II    Per cardiology d/t heart failure with elevated biomarkers -- continue current dosing medical therapy --> ASA, statin, coreg, losartan   Cards signed off 4/30/20 --> recommend f/u 1-2 weeks after discharge & 2D echo in 3 months    · Ambulating with one person assist -- encourage OOB activity  · CPR  · Discussed with Dr. Keys  · Anticipate discharge TBD / CCP assisting / multiple rehabs refusing /  Psychiatry following may need inpatient psych admission      UHANG Estrada  [facility]  05/01/20  12:38

## 2020-05-01 NOTE — NURSING NOTE
"Pt speaking of owning a gun \"Not in my possession. My brother has it. Sometimes the temptation to pull the trigger is too strong.\"  "

## 2020-05-01 NOTE — PROGRESS NOTES
Continued Stay Note  [facility]     Patient Name: Ada Ngo  MRN: [id number]  Today's Date: 5/1/2020    Admit Date: 4/20/2020    Discharge Plan     Row Name 05/01/20 1809       Plan    Plan  Home with [facility]    Patient/Family in Agreement with Plan  yes    Plan Comments  Pt needs walker and nebulizer at discharge.  Order noted and faxed to [facility].  [phone].  Spoke with [employer] and notified them that nebulizer will need to be delivered to pt home.  Walker at bedside.  cab voucher provided to nurse who will call and arrange transportation.  ANKIT Heard RN    Row Name 05/01/20 1707       Plan    Plan  Home with [facility]    Patient/Family in Agreement with Plan  yes    Plan Comments  Pt went 100ft with PT today.  Discussed with spouse who agreeable to to pt discharging to home with [facility].  Spouse states he will assist pt with mobility.         Discharge Codes    No documentation.       Expected Discharge Date and Time     Expected Discharge Date Expected Discharge Time    May 1, 2020             Zulma Heard RN

## 2020-05-01 NOTE — PROGRESS NOTES
Adult Nutrition  Assessment/PES    Patient Name:  Ada Ngo  YOB: 1960  MRN: [id number]  Admit Date:  4/20/2020    Assessment Date:  5/1/2020  Nutrition follow up.   Reason for Assessment     Row Name 05/01/20 1456          Reason for Assessment    Reason For Assessment  follow-up protocol Cardiac safe tray, boost glucose control ordered BID. Tolerating diet. Average po intake 40% of meals.   Now issues with pain?   RD to continue to follow/monitor as needed.             Labs/Tests/Procedures/Meds     Row Name 05/01/20 1456          Labs/Procedures/Meds    Lab Results Reviewed  reviewed, pertinent        Diagnostic Tests/Procedures    Diagnostic Test/Procedure Reviewed  reviewed, pertinent        Medications    Pertinent Medications Reviewed  reviewed, pertinent             Nutrition Prescription Ordered     Row Name 05/01/20 1456          Nutrition Prescription PO    Supplement  Boost Glucose Control (Glucerna Shake)     Supplement Frequency  2 times a day     Common Modifiers  Cardiac         Evaluation of Received Nutrient/Fluid Intake     Row Name 05/01/20 1456          PO Evaluation    Number of Meals  3     % PO Intake  42         Problem/Interventions:    Intervention Goal     Row Name 05/01/20 1457          Intervention Goal    General  Maintain nutrition;Meet nutritional needs for age/condition     PO  Tolerate PO;Increase intake     Weight  Maintain weight         Nutrition Intervention     Row Name 05/01/20 1457          Nutrition Intervention    RD/Tech Action  Follow Tx progress;Care plan reviewd           Education/Evaluation     Row Name 05/01/20 1457          Monitor/Evaluation    Monitor  Per protocol;I&O;PO intake;Supplement intake;Pertinent labs;Weight;Skin status;Symptoms           Electronically signed by:  Daisha Hardy RD  05/01/20 14:57

## 2020-05-01 NOTE — PROGRESS NOTES
"      [facility]         Dr March Sayied   LOS: 10 days   Patient Care Team:  Trenton Cerrato Jr., DO as PCP - General (Family Medicine)    Chief Complaint: Follow-up respiratory failure pneumonia pulmonary edema withdrawal and multiple medical issues    Interval History: Awake alert no issues reported.  No pulmonary issues.  Wants an inhaler at discharge    REVIEW OF SYSTEMS:   CARDIOVASCULAR: No chest pain, chest pressure or chest discomfort. No palpitations or edema.   RESPIRATORY: No shortness of breath, cough or sputum.   GASTROINTESTINAL: No anorexia, nausea, vomiting or diarrhea. No abdominal pain or blood.   HEMATOLOGIC: No bleeding or bruising.     Ventilator/Non-Invasive Ventilation Settings (From admission, onward)   On room air      Vital Signs  Temp:  [97.8 °F (36.6 °C)-98.8 °F (37.1 °C)] 98.5 °F (36.9 °C)  Heart Rate:  [79-95] 91  Resp:  [16-18] 18  BP: ()/(46-90) 116/90    Intake/Output Summary (Last 24 hours) at 5/1/2020 1218  Last data filed at 5/1/2020 0744  Gross per 24 hour   Intake 600 ml   Output 1500 ml   Net -900 ml     Flowsheet Rows      First Filed Value   Admission Height  172.7 cm (68\") Documented at 04/20/2020 1825   Admission Weight  79.4 kg (175 lb) Documented at 04/20/2020 1825          Physical Exam:  Patient is examined using the personal protective equipment as per guidelines from infection control for this particular patient as enacted.  Hand hygiene was performed before and after patient interaction.   General Appearance:    Alert, cooperative, in no acute distress.  Following simple commands.  Currently in restraints  Neck midline trachea no thyromegaly   Lungs:    Diminished breath sounds bilaterally with rhonchi with rhonchi on the bases    Heart:    Regular rhythm and normal rate, normal S1 and S2, no            murmur, no gallop, no rub, no click   Chest Wall:    No abnormalities observed   Abdomen:     Normal bowel sounds, no masses, no " organomegaly, soft        non-tender, non-distended, no guarding, no rebound                tenderness   Extremities:   Moves all extremities well, no edema, no cyanosis, no             redness  CNS no focal neurological deficits no distress  Skin no rashes no nodules  Psych oriented to time place and person normal memory  Musculoskeletal no cyanosis no clubbing normal range of motion     Results Review:        Results from last 7 days   Lab Units 04/30/20  0931 04/28/20  0510 04/27/20  0310   SODIUM mmol/L 135* 131* 138   POTASSIUM mmol/L 3.6 4.4 4.0   CHLORIDE mmol/L 96* 97* 101   CO2 mmol/L 23.8 21.9* 23.4   BUN mg/dL 12 12 15   CREATININE mg/dL 0.72 0.56* 0.77   GLUCOSE mg/dL 143* 124* 201*   CALCIUM mg/dL 8.8 8.8 8.4*     Results from last 7 days   Lab Units 04/25/20  1251 04/25/20  0518   TROPONIN T ng/mL 0.065* 0.066*     Results from last 7 days   Lab Units 04/28/20  0510 04/27/20  0310 04/26/20  0606   WBC 10*3/mm3 5.45 4.93 4.51   HEMOGLOBIN g/dL 10.9* 10.6* 9.6*   HEMATOCRIT % 32.8* 32.1* 29.4*   PLATELETS 10*3/mm3 198 196 137*         Results from last 7 days   Lab Units 04/28/20  0510   CHOLESTEROL mg/dL 131         Results from last 7 days   Lab Units 04/28/20  0510   CHOLESTEROL mg/dL 131   TRIGLYCERIDES mg/dL 109   HDL CHOL mg/dL 46   LDL CHOL mg/dL 63     Results from last 7 days   Lab Units 04/25/20  1612   PH, ARTERIAL pH units 7.541*   PO2 ART mm Hg 59.2*   PCO2, ARTERIAL mm Hg 33.4*   HCO3 ART mmol/L 28.6*       I reviewed the patient's new clinical results.  I personally viewed and interpreted the patient's CXR        Medication Review:     aspirin 81 mg Oral Daily   atorvastatin 10 mg Oral Nightly   budesonide 0.5 mg Nebulization BID - RT   carvedilol 6.25 mg Oral BID With Meals   guaiFENesin 600 mg Oral Q12H   insulin glargine 15 Units Subcutaneous Daily   insulin lispro 0-7 Units Subcutaneous TID AC   ipratropium-albuterol 3 mL Nebulization 4x Daily - RT   lidocaine 2 patch Transdermal Q24H    [START ON 5/2/2020] losartan 25 mg Oral Daily   nicotine 1 patch Transdermal Q24H   potassium chloride 20 mEq Oral Daily   sodium chloride 10 mL Intravenous Q12H            ASSESSMENT:   1. Acute on chronic hypoxic and hypercapnic respiratory failure, secondary to COPD exacerbation, pneumonia/pulmonary edema and benzodiazepine  2. COPD exacerbation  3. Mild lactic acidosis, likely secondary to alcoholism, resolved  4. Bilateral pulmonary edema, mild  5. Bilateral lower lobe consolidation, effusion versus pneumonia  6. Acute systolic CHF, EF 39%,-Takotsubo cardiomyopathy  7. Pulmonary hypertension, RVSP 35-25, likely secondary to group 2 and 3  8. High anion gap metabolic acidosis: Lactic acidosis has resolved.  Likely secondary to ketoacidosis from alcoholism  9. Mild alcoholic hepatitis  10. Alcohol withdrawal.  Alcohol intoxication on admission  11. Benzodiazepine dependence  12. Hypokalemia  13. Steroid-induced hyperglycemia    PLAN:  Currently off oxygen and tolerating well  Steroids have been discontinued.  Patient has been transitioned to Valium which is her home medications.  Bronchodilators to be continued  Blood glucose management per hospitalist  She is currently off restraints.  Continue diuresis with close monitoring of creatinine and electrolytes.  No objections to rehab at any time  We will sign off      Anca Land MD  05/01/20  12:18

## 2020-05-01 NOTE — PLAN OF CARE
Problem: Patient Care Overview  Goal: Plan of Care Review  Outcome: Ongoing (interventions implemented as appropriate)  Flowsheets (Taken 5/1/2020 2783)  Outcome Summary: pt is progressing with mobility, able to walk farther, requiring less assist, she does fatigue and get SOA with minimal activity, quality of gait is poor with forward flexed posture, she is unsteady but no overt loss of balance

## 2020-05-02 NOTE — OUTREACH NOTE
Prep Survey      Responses   [facility] patient discharged from?  [place]   Is LACE score < 7 ?  No   Eligibility  Not Eligible   What are the reasons patient is not eligible?  Behavioral Health   COVID-19 Test Status  Negative   Does the patient have one of the following disease processes/diagnoses(primary or secondary)?  Other   Prep survey completed?  Yes          Priscilla Keene RN

## 2020-05-04 NOTE — PROGRESS NOTES
Case Management Discharge Note      Final Note: Pt dischaged home with [facility]  Walker provided by Dillon Heard RN    Provided Post Acute Provider List?: Yes  Post Acute Provider List: Home Health, Nursing Home  Provided Post Acute Provider Quality & Resource List?: N/A  N/A Quality & Resource List Comment: Patient declined ratings and stated she wants to use [facility] again  Delivered To: Patient  Method of Delivery: In person    Destination      No service has been selected for the patient.      Durable Medical Equipment      No service has been selected for the patient.      Dialysis/Infusion      No service has been selected for the patient.      Home Medical Care - Selection Complete      Service Provider Request Status Selected Services Address Phone Number Fax Number    [facility] Selected Home Health Services [address] [place] [address] [phone]      Therapy      No service has been selected for the patient.      Community Resources      No service has been selected for the patient.        Transportation Services  W/C Van: [employer]    Final Discharge Disposition Code: 06 - home with home health care

## 2020-05-04 NOTE — PROGRESS NOTES
Case Management Discharge Note      Final Note: Pt dischaged home with [facility]  Walker provided by Dillon Heard RN    Provided Post Acute Provider List?: Yes  Post Acute Provider List: Home Health, Nursing Home  Provided Post Acute Provider Quality & Resource List?: N/A  N/A Quality & Resource List Comment: Patient declined ratings and stated she wants to use [facility] again  Delivered To: Patient  Method of Delivery: In person    Destination      No service has been selected for the patient.      Durable Medical Equipment      No service has been selected for the patient.      Dialysis/Infusion      No service has been selected for the patient.      Home Medical Care      No service has been selected for the patient.      Therapy      No service has been selected for the patient.      Community Resources      No service has been selected for the patient.        Transportation Services  W/C Van: [employer]    Final Discharge Disposition Code: 06 - home with home health care

## 2020-05-14 ENCOUNTER — TELEPHONE (OUTPATIENT)
Dept: CARDIOLOGY | Facility: CLINIC | Age: 60
End: 2020-05-14

## 2020-05-14 RX ORDER — ALBUTEROL SULFATE 2.5 MG/3ML
2.5 SOLUTION RESPIRATORY (INHALATION) EVERY 4 HOURS PRN
Qty: 540 ML | Refills: 0 | Status: SHIPPED | OUTPATIENT
Start: 2020-05-14 | End: 2020-05-15 | Stop reason: SDUPTHER

## 2020-05-14 NOTE — TELEPHONE ENCOUNTER
Called patient and told her the above.  Stressed to her that we filled this one time and she would have to call her PCP for further refills. She thanked both of us for sending in the medication and verbalized understanding.    Yany

## 2020-05-15 RX ORDER — ALBUTEROL SULFATE 2.5 MG/3ML
2.5 SOLUTION RESPIRATORY (INHALATION) EVERY 4 HOURS PRN
Qty: 540 ML | Refills: 0 | Status: SHIPPED | OUTPATIENT
Start: 2020-05-15

## 2020-05-22 ENCOUNTER — TELEPHONE (OUTPATIENT)
Dept: PSYCHIATRY | Facility: CLINIC | Age: 60
End: 2020-05-22

## 2020-05-22 NOTE — TELEPHONE ENCOUNTER
5/22/20---PTS  CALLED FOR MERCEDES--- MERCEDES WANTS  Temazepam &  Diazepam----- recently---- had zachary  was in the hospital  For 2 weeks at [facility]-- Uses [facility]/ [facility] -#[phone]--PLEASE REVIEW AND ADVISE/JAYCOB

## 2020-05-22 NOTE — TELEPHONE ENCOUNTER
5/22/20-- called back upset about his wife not getting the meds she wanted refilled--  Told him Dr OTT reviewed her chart  From when Ada was in pt at [facility] for [facility]----  said something is not right and wants to talk to Dr OTT about  The situation of her meds-- pt call back #[phone]--pt wants to be called about the matter

## 2020-05-22 NOTE — TELEPHONE ENCOUNTER
I   reviewed the pt chart and psych consult   The pt was seen by psych during hospitalization at [facility], Dr Avalos did not recommend any benzo /hypnotics , she should not be in withdrawal since she was not taking valium or temazepam  Since April .   I was tapering her off valium here and taper should be already completed by now , so valium and temazepam wont be refilled

## 2020-05-23 ENCOUNTER — HOSPITAL ENCOUNTER (INPATIENT)
Facility: HOSPITAL | Age: 60
LOS: 3 days | Discharge: HOME | End: 2020-05-29
Attending: EMERGENCY MEDICINE | Admitting: INTERNAL MEDICINE

## 2020-05-23 DIAGNOSIS — R45.851 PASSIVE SUICIDAL IDEATIONS: ICD-10-CM

## 2020-05-23 DIAGNOSIS — F10.929 ALCOHOLIC INTOXICATION WITH COMPLICATION (HCC): Primary | ICD-10-CM

## 2020-05-23 DIAGNOSIS — F10.939 ALCOHOL WITHDRAWAL SYNDROME WITH COMPLICATION (HCC): ICD-10-CM

## 2020-05-23 LAB
ALBUMIN SERPL-MCNC: 4.2 G/DL (ref 3.5–5.2)
ALBUMIN/GLOB SERPL: 1.2 G/DL
ALP SERPL-CCNC: 101 U/L (ref 39–117)
ALT SERPL W P-5'-P-CCNC: 92 U/L (ref 1–33)
ANION GAP SERPL CALCULATED.3IONS-SCNC: 23.6 MMOL/L (ref 5–15)
APAP SERPL-MCNC: <5 MCG/ML (ref 10–30)
AST SERPL-CCNC: 148 U/L (ref 1–32)
BASOPHILS # BLD AUTO: 0.04 10*3/MM3 (ref 0–0.2)
BASOPHILS NFR BLD AUTO: 0.8 % (ref 0–1.5)
BILIRUB SERPL-MCNC: 0.4 MG/DL (ref 0.2–1.2)
BUN BLD-MCNC: 11 MG/DL (ref 8–23)
BUN/CREAT SERPL: 16.4 (ref 7–25)
CALCIUM SPEC-SCNC: 8.7 MG/DL (ref 8.6–10.5)
CHLORIDE SERPL-SCNC: 101 MMOL/L (ref 98–107)
CO2 SERPL-SCNC: 16.4 MMOL/L (ref 22–29)
CREAT BLD-MCNC: 0.67 MG/DL (ref 0.57–1)
DEPRECATED RDW RBC AUTO: 59.6 FL (ref 37–54)
EOSINOPHIL # BLD AUTO: 0.12 10*3/MM3 (ref 0–0.4)
EOSINOPHIL NFR BLD AUTO: 2.5 % (ref 0.3–6.2)
ERYTHROCYTE [DISTWIDTH] IN BLOOD BY AUTOMATED COUNT: 17.9 % (ref 12.3–15.4)
ETHANOL BLD-MCNC: 339 MG/DL (ref 0–10)
ETHANOL UR QL: 0.34 %
GFR SERPL CREATININE-BSD FRML MDRD: 89 ML/MIN/1.73
GLOBULIN UR ELPH-MCNC: 3.5 GM/DL
GLUCOSE BLD-MCNC: 72 MG/DL (ref 65–99)
GLUCOSE BLDC GLUCOMTR-MCNC: 70 MG/DL (ref 70–130)
HCT VFR BLD AUTO: 39.9 % (ref 34–46.6)
HGB BLD-MCNC: 13 G/DL (ref 12–15.9)
HOLD SPECIMEN: NORMAL
HOLD SPECIMEN: NORMAL
IMM GRANULOCYTES # BLD AUTO: 0.02 10*3/MM3 (ref 0–0.05)
IMM GRANULOCYTES NFR BLD AUTO: 0.4 % (ref 0–0.5)
INR PPP: 1.04 (ref 0.9–1.1)
LYMPHOCYTES # BLD AUTO: 1.81 10*3/MM3 (ref 0.7–3.1)
LYMPHOCYTES NFR BLD AUTO: 38.3 % (ref 19.6–45.3)
MCH RBC QN AUTO: 29.6 PG (ref 26.6–33)
MCHC RBC AUTO-ENTMCNC: 32.6 G/DL (ref 31.5–35.7)
MCV RBC AUTO: 90.9 FL (ref 79–97)
MONOCYTES # BLD AUTO: 0.3 10*3/MM3 (ref 0.1–0.9)
MONOCYTES NFR BLD AUTO: 6.3 % (ref 5–12)
NEUTROPHILS # BLD AUTO: 2.44 10*3/MM3 (ref 1.7–7)
NEUTROPHILS NFR BLD AUTO: 51.7 % (ref 42.7–76)
NRBC BLD AUTO-RTO: 0 /100 WBC (ref 0–0.2)
PLATELET # BLD AUTO: 157 10*3/MM3 (ref 140–450)
PMV BLD AUTO: 10.4 FL (ref 6–12)
POTASSIUM BLD-SCNC: 3.8 MMOL/L (ref 3.5–5.2)
PROT SERPL-MCNC: 7.7 G/DL (ref 6–8.5)
PROTHROMBIN TIME: 13.3 SECONDS (ref 11.7–14.2)
RBC # BLD AUTO: 4.39 10*6/MM3 (ref 3.77–5.28)
SALICYLATES SERPL-MCNC: <0.3 MG/DL
SODIUM BLD-SCNC: 141 MMOL/L (ref 136–145)
WBC NRBC COR # BLD: 4.73 10*3/MM3 (ref 3.4–10.8)
WHOLE BLOOD HOLD SPECIMEN: NORMAL
WHOLE BLOOD HOLD SPECIMEN: NORMAL

## 2020-05-23 PROCEDURE — 85610 PROTHROMBIN TIME: CPT | Performed by: NURSE PRACTITIONER

## 2020-05-23 PROCEDURE — 80307 DRUG TEST PRSMV CHEM ANLYZR: CPT | Performed by: EMERGENCY MEDICINE

## 2020-05-23 PROCEDURE — 25010000002 THIAMINE PER 100 MG: Performed by: NURSE PRACTITIONER

## 2020-05-23 PROCEDURE — 80053 COMPREHEN METABOLIC PANEL: CPT | Performed by: EMERGENCY MEDICINE

## 2020-05-23 PROCEDURE — 99285 EMERGENCY DEPT VISIT HI MDM: CPT

## 2020-05-23 PROCEDURE — 25010000002 MAGNESIUM SULFATE PER 500 MG OF MAGNESIUM: Performed by: NURSE PRACTITIONER

## 2020-05-23 PROCEDURE — 82962 GLUCOSE BLOOD TEST: CPT

## 2020-05-23 PROCEDURE — 85025 COMPLETE CBC W/AUTO DIFF WBC: CPT | Performed by: EMERGENCY MEDICINE

## 2020-05-23 RX ORDER — SODIUM CHLORIDE 0.9 % (FLUSH) 0.9 %
10 SYRINGE (ML) INJECTION AS NEEDED
Status: DISCONTINUED | OUTPATIENT
Start: 2020-05-23 | End: 2020-05-29 | Stop reason: HOSPADM

## 2020-05-23 RX ORDER — ONDANSETRON 2 MG/ML
4 INJECTION INTRAMUSCULAR; INTRAVENOUS ONCE
Status: COMPLETED | OUTPATIENT
Start: 2020-05-23 | End: 2020-05-24

## 2020-05-23 RX ADMIN — SODIUM CHLORIDE 1000 ML: 9 INJECTION, SOLUTION INTRAVENOUS at 21:21

## 2020-05-23 RX ADMIN — FOLIC ACID 1000 ML/HR: 5 INJECTION, SOLUTION INTRAMUSCULAR; INTRAVENOUS; SUBCUTANEOUS at 23:29

## 2020-05-24 ENCOUNTER — APPOINTMENT (OUTPATIENT)
Dept: CARDIOLOGY | Facility: HOSPITAL | Age: 60
End: 2020-05-24

## 2020-05-24 ENCOUNTER — APPOINTMENT (OUTPATIENT)
Dept: CT IMAGING | Facility: HOSPITAL | Age: 60
End: 2020-05-24

## 2020-05-24 ENCOUNTER — APPOINTMENT (OUTPATIENT)
Dept: GENERAL RADIOLOGY | Facility: HOSPITAL | Age: 60
End: 2020-05-24

## 2020-05-24 PROBLEM — I51.81 STRESS-INDUCED CARDIOMYOPATHY: Status: RESOLVED | Noted: 2020-04-22 | Resolved: 2020-05-24

## 2020-05-24 PROBLEM — J44.9 COPD (CHRONIC OBSTRUCTIVE PULMONARY DISEASE) (HCC): Status: ACTIVE | Noted: 2020-05-24

## 2020-05-24 LAB
AMPHET+METHAMPHET UR QL: NEGATIVE
BARBITURATES UR QL SCN: NEGATIVE
BENZODIAZ UR QL SCN: POSITIVE
CANNABINOIDS SERPL QL: NEGATIVE
COCAINE UR QL: NEGATIVE
ETHANOL BLD-MCNC: 78 MG/DL (ref 0–10)
ETHANOL UR QL: 0.08 %
GLUCOSE BLDC GLUCOMTR-MCNC: 114 MG/DL (ref 70–130)
METHADONE UR QL SCN: NEGATIVE
OPIATES UR QL: NEGATIVE
OXYCODONE UR QL SCN: NEGATIVE
TROPONIN T SERPL-MCNC: <0.01 NG/ML (ref 0–0.03)

## 2020-05-24 PROCEDURE — G0378 HOSPITAL OBSERVATION PER HR: HCPCS

## 2020-05-24 PROCEDURE — 71045 X-RAY EXAM CHEST 1 VIEW: CPT

## 2020-05-24 PROCEDURE — 25010000002 LORAZEPAM PER 2 MG: Performed by: EMERGENCY MEDICINE

## 2020-05-24 PROCEDURE — 80307 DRUG TEST PRSMV CHEM ANLYZR: CPT | Performed by: EMERGENCY MEDICINE

## 2020-05-24 PROCEDURE — 25010000002 ONDANSETRON PER 1 MG: Performed by: PHYSICIAN ASSISTANT

## 2020-05-24 PROCEDURE — 93308 TTE F-UP OR LMTD: CPT | Performed by: INTERNAL MEDICINE

## 2020-05-24 PROCEDURE — 94799 UNLISTED PULMONARY SVC/PX: CPT

## 2020-05-24 PROCEDURE — 93010 ELECTROCARDIOGRAM REPORT: CPT | Performed by: INTERNAL MEDICINE

## 2020-05-24 PROCEDURE — 93308 TTE F-UP OR LMTD: CPT

## 2020-05-24 PROCEDURE — 90791 PSYCH DIAGNOSTIC EVALUATION: CPT

## 2020-05-24 PROCEDURE — 25010000002 ONDANSETRON PER 1 MG: Performed by: NURSE PRACTITIONER

## 2020-05-24 PROCEDURE — 25010000002 LORAZEPAM PER 2 MG: Performed by: INTERNAL MEDICINE

## 2020-05-24 PROCEDURE — 93005 ELECTROCARDIOGRAM TRACING: CPT | Performed by: EMERGENCY MEDICINE

## 2020-05-24 PROCEDURE — 82962 GLUCOSE BLOOD TEST: CPT

## 2020-05-24 PROCEDURE — 84484 ASSAY OF TROPONIN QUANT: CPT | Performed by: NURSE PRACTITIONER

## 2020-05-24 PROCEDURE — 25010000002 ONDANSETRON PER 1 MG: Performed by: EMERGENCY MEDICINE

## 2020-05-24 PROCEDURE — 94640 AIRWAY INHALATION TREATMENT: CPT

## 2020-05-24 RX ORDER — FOLIC ACID 1 MG/1
1 TABLET ORAL DAILY
Status: COMPLETED | OUTPATIENT
Start: 2020-05-25 | End: 2020-05-27

## 2020-05-24 RX ORDER — LORAZEPAM 2 MG/ML
0.5 INJECTION INTRAMUSCULAR ONCE
Status: COMPLETED | OUTPATIENT
Start: 2020-05-24 | End: 2020-05-24

## 2020-05-24 RX ORDER — SODIUM CHLORIDE 9 MG/ML
75 INJECTION, SOLUTION INTRAVENOUS CONTINUOUS
Status: ACTIVE | OUTPATIENT
Start: 2020-05-24 | End: 2020-05-24

## 2020-05-24 RX ORDER — ONDANSETRON 2 MG/ML
4 INJECTION INTRAMUSCULAR; INTRAVENOUS EVERY 6 HOURS PRN
Status: DISCONTINUED | OUTPATIENT
Start: 2020-05-24 | End: 2020-05-29 | Stop reason: HOSPADM

## 2020-05-24 RX ORDER — ALBUTEROL SULFATE 2.5 MG/3ML
2.5 SOLUTION RESPIRATORY (INHALATION) EVERY 4 HOURS PRN
Status: DISCONTINUED | OUTPATIENT
Start: 2020-05-24 | End: 2020-05-29 | Stop reason: HOSPADM

## 2020-05-24 RX ORDER — ASPIRIN 81 MG/1
81 TABLET ORAL DAILY
Status: DISCONTINUED | OUTPATIENT
Start: 2020-05-24 | End: 2020-05-29 | Stop reason: HOSPADM

## 2020-05-24 RX ORDER — ISOSORBIDE MONONITRATE 30 MG/1
30 TABLET, EXTENDED RELEASE ORAL
Status: DISCONTINUED | OUTPATIENT
Start: 2020-05-24 | End: 2020-05-29 | Stop reason: HOSPADM

## 2020-05-24 RX ORDER — IPRATROPIUM BROMIDE AND ALBUTEROL SULFATE 2.5; .5 MG/3ML; MG/3ML
3 SOLUTION RESPIRATORY (INHALATION) ONCE
Status: COMPLETED | OUTPATIENT
Start: 2020-05-24 | End: 2020-05-24

## 2020-05-24 RX ORDER — NICOTINE 21 MG/24HR
1 PATCH, TRANSDERMAL 24 HOURS TRANSDERMAL
Status: DISCONTINUED | OUTPATIENT
Start: 2020-05-24 | End: 2020-05-29 | Stop reason: HOSPADM

## 2020-05-24 RX ORDER — SODIUM CHLORIDE 0.9 % (FLUSH) 0.9 %
10 SYRINGE (ML) INJECTION AS NEEDED
Status: DISCONTINUED | OUTPATIENT
Start: 2020-05-24 | End: 2020-05-29 | Stop reason: HOSPADM

## 2020-05-24 RX ORDER — LORAZEPAM 2 MG/ML
2 INJECTION INTRAMUSCULAR
Status: DISCONTINUED | OUTPATIENT
Start: 2020-05-24 | End: 2020-05-25

## 2020-05-24 RX ORDER — NITROGLYCERIN 0.4 MG/1
0.4 TABLET SUBLINGUAL
Status: DISCONTINUED | OUTPATIENT
Start: 2020-05-24 | End: 2020-05-29 | Stop reason: HOSPADM

## 2020-05-24 RX ORDER — LOSARTAN POTASSIUM 50 MG/1
50 TABLET ORAL DAILY
Status: DISCONTINUED | OUTPATIENT
Start: 2020-05-24 | End: 2020-05-29 | Stop reason: HOSPADM

## 2020-05-24 RX ORDER — LORAZEPAM 2 MG/ML
1 INJECTION INTRAMUSCULAR ONCE
Status: COMPLETED | OUTPATIENT
Start: 2020-05-24 | End: 2020-05-24

## 2020-05-24 RX ORDER — LORAZEPAM 1 MG/1
2 TABLET ORAL
Status: DISCONTINUED | OUTPATIENT
Start: 2020-05-24 | End: 2020-05-25

## 2020-05-24 RX ORDER — GUAIFENESIN 600 MG/1
600 TABLET, EXTENDED RELEASE ORAL EVERY 12 HOURS SCHEDULED
Status: DISCONTINUED | OUTPATIENT
Start: 2020-05-24 | End: 2020-05-29 | Stop reason: HOSPADM

## 2020-05-24 RX ORDER — CARVEDILOL 6.25 MG/1
6.25 TABLET ORAL 2 TIMES DAILY WITH MEALS
Status: DISCONTINUED | OUTPATIENT
Start: 2020-05-24 | End: 2020-05-29 | Stop reason: HOSPADM

## 2020-05-24 RX ORDER — BUDESONIDE 0.5 MG/2ML
0.5 INHALANT ORAL
Status: DISCONTINUED | OUTPATIENT
Start: 2020-05-24 | End: 2020-05-29 | Stop reason: HOSPADM

## 2020-05-24 RX ORDER — LORAZEPAM 2 MG/ML
1 INJECTION INTRAMUSCULAR
Status: DISCONTINUED | OUTPATIENT
Start: 2020-05-24 | End: 2020-05-25

## 2020-05-24 RX ORDER — MIRTAZAPINE 15 MG/1
15 TABLET, FILM COATED ORAL NIGHTLY
Status: DISCONTINUED | OUTPATIENT
Start: 2020-05-24 | End: 2020-05-29 | Stop reason: HOSPADM

## 2020-05-24 RX ORDER — MECLIZINE HYDROCHLORIDE 25 MG/1
25 TABLET ORAL EVERY 6 HOURS PRN
Status: DISCONTINUED | OUTPATIENT
Start: 2020-05-24 | End: 2020-05-29 | Stop reason: HOSPADM

## 2020-05-24 RX ORDER — DIPHENOXYLATE HYDROCHLORIDE AND ATROPINE SULFATE 2.5; .025 MG/1; MG/1
1 TABLET ORAL DAILY
Status: COMPLETED | OUTPATIENT
Start: 2020-05-25 | End: 2020-05-27

## 2020-05-24 RX ORDER — LORAZEPAM 1 MG/1
1 TABLET ORAL
Status: DISCONTINUED | OUTPATIENT
Start: 2020-05-24 | End: 2020-05-25

## 2020-05-24 RX ORDER — ACETAMINOPHEN 325 MG/1
650 TABLET ORAL EVERY 6 HOURS PRN
Status: DISCONTINUED | OUTPATIENT
Start: 2020-05-24 | End: 2020-05-29 | Stop reason: HOSPADM

## 2020-05-24 RX ORDER — ONDANSETRON 2 MG/ML
4 INJECTION INTRAMUSCULAR; INTRAVENOUS ONCE
Status: COMPLETED | OUTPATIENT
Start: 2020-05-24 | End: 2020-05-24

## 2020-05-24 RX ORDER — SODIUM CHLORIDE 0.9 % (FLUSH) 0.9 %
10 SYRINGE (ML) INJECTION EVERY 12 HOURS SCHEDULED
Status: DISCONTINUED | OUTPATIENT
Start: 2020-05-24 | End: 2020-05-29 | Stop reason: HOSPADM

## 2020-05-24 RX ORDER — THIAMINE MONONITRATE (VIT B1) 100 MG
100 TABLET ORAL DAILY
Status: COMPLETED | OUTPATIENT
Start: 2020-05-25 | End: 2020-05-27

## 2020-05-24 RX ADMIN — LORAZEPAM 2 MG: 2 INJECTION INTRAMUSCULAR; INTRAVENOUS at 12:22

## 2020-05-24 RX ADMIN — MECLIZINE HYDROCHLORIDE 25 MG: 25 TABLET ORAL at 22:30

## 2020-05-24 RX ADMIN — ISOSORBIDE MONONITRATE 30 MG: 30 TABLET ORAL at 12:23

## 2020-05-24 RX ADMIN — LORAZEPAM 1 MG: 2 INJECTION INTRAMUSCULAR; INTRAVENOUS at 10:11

## 2020-05-24 RX ADMIN — ONDANSETRON 4 MG: 2 INJECTION INTRAMUSCULAR; INTRAVENOUS at 17:52

## 2020-05-24 RX ADMIN — BUDESONIDE 0.5 MG: 0.5 INHALANT RESPIRATORY (INHALATION) at 21:18

## 2020-05-24 RX ADMIN — GUAIFENESIN 600 MG: 600 TABLET, EXTENDED RELEASE ORAL at 12:23

## 2020-05-24 RX ADMIN — BUDESONIDE 0.5 MG: 0.5 INHALANT RESPIRATORY (INHALATION) at 12:56

## 2020-05-24 RX ADMIN — ONDANSETRON 4 MG: 2 INJECTION INTRAMUSCULAR; INTRAVENOUS at 05:38

## 2020-05-24 RX ADMIN — LORAZEPAM 1 MG: 2 INJECTION INTRAMUSCULAR; INTRAVENOUS at 16:24

## 2020-05-24 RX ADMIN — MIRTAZAPINE 15 MG: 15 TABLET, FILM COATED ORAL at 22:30

## 2020-05-24 RX ADMIN — SODIUM CHLORIDE 125 ML/HR: 9 INJECTION, SOLUTION INTRAVENOUS at 09:19

## 2020-05-24 RX ADMIN — ONDANSETRON 4 MG: 2 INJECTION INTRAMUSCULAR; INTRAVENOUS at 00:02

## 2020-05-24 RX ADMIN — IPRATROPIUM BROMIDE AND ALBUTEROL SULFATE 3 ML: 2.5; .5 SOLUTION RESPIRATORY (INHALATION) at 00:30

## 2020-05-24 RX ADMIN — LOSARTAN POTASSIUM 50 MG: 50 TABLET, FILM COATED ORAL at 12:23

## 2020-05-24 RX ADMIN — LORAZEPAM 1 MG: 2 INJECTION INTRAMUSCULAR; INTRAVENOUS at 02:02

## 2020-05-24 RX ADMIN — LORAZEPAM 0.5 MG: 2 INJECTION INTRAMUSCULAR; INTRAVENOUS at 09:19

## 2020-05-24 RX ADMIN — CARVEDILOL 6.25 MG: 6.25 TABLET, FILM COATED ORAL at 17:52

## 2020-05-24 RX ADMIN — GUAIFENESIN 600 MG: 600 TABLET, EXTENDED RELEASE ORAL at 22:30

## 2020-05-24 RX ADMIN — LORAZEPAM 2 MG: 2 INJECTION INTRAMUSCULAR; INTRAVENOUS at 22:30

## 2020-05-24 RX ADMIN — SODIUM CHLORIDE, PRESERVATIVE FREE 10 ML: 5 INJECTION INTRAVENOUS at 22:30

## 2020-05-24 RX ADMIN — ONDANSETRON 4 MG: 2 INJECTION INTRAMUSCULAR; INTRAVENOUS at 12:22

## 2020-05-24 RX ADMIN — NICOTINE 1 PATCH: 21 PATCH, EXTENDED RELEASE TRANSDERMAL at 12:22

## 2020-05-24 RX ADMIN — ASPIRIN 81 MG: 81 TABLET, COATED ORAL at 12:22

## 2020-05-24 RX ADMIN — LORAZEPAM 2 MG: 2 INJECTION INTRAMUSCULAR; INTRAVENOUS at 19:30

## 2020-05-24 RX ADMIN — SODIUM CHLORIDE, PRESERVATIVE FREE 10 ML: 5 INJECTION INTRAVENOUS at 11:06

## 2020-05-25 ENCOUNTER — APPOINTMENT (OUTPATIENT)
Dept: CT IMAGING | Facility: HOSPITAL | Age: 60
End: 2020-05-25

## 2020-05-25 LAB
ALBUMIN SERPL-MCNC: 3.8 G/DL (ref 3.5–5.2)
ALBUMIN/GLOB SERPL: 1.1 G/DL
ALP SERPL-CCNC: 88 U/L (ref 39–117)
ALT SERPL W P-5'-P-CCNC: 53 U/L (ref 1–33)
ANION GAP SERPL CALCULATED.3IONS-SCNC: 16.6 MMOL/L (ref 5–15)
AST SERPL-CCNC: 51 U/L (ref 1–32)
BH CV ECHO MEAS - BSA(HAYCOCK): 2 M^2
BH CV ECHO MEAS - BSA: 1.9 M^2
BH CV ECHO MEAS - BZI_BMI: 29.2 KILOGRAMS/M^2
BH CV ECHO MEAS - BZI_METRIC_HEIGHT: 167.6 CM
BH CV ECHO MEAS - BZI_METRIC_WEIGHT: 82.1 KG
BH CV ECHO MEAS - EDV(MOD-SP2): 55 ML
BH CV ECHO MEAS - EDV(MOD-SP4): 47 ML
BH CV ECHO MEAS - EF(MOD-BP): 58 %
BH CV ECHO MEAS - EF(MOD-SP2): 60 %
BH CV ECHO MEAS - EF(MOD-SP4): 57.4 %
BH CV ECHO MEAS - ESV(MOD-SP2): 22 ML
BH CV ECHO MEAS - ESV(MOD-SP4): 20 ML
BH CV ECHO MEAS - LV DIASTOLIC VOL/BSA (35-75): 24.5 ML/M^2
BH CV ECHO MEAS - LV SYSTOLIC VOL/BSA (12-30): 10.4 ML/M^2
BH CV ECHO MEAS - LVLD AP2: 7.6 CM
BH CV ECHO MEAS - LVLD AP4: 7 CM
BH CV ECHO MEAS - LVLS AP2: 5.3 CM
BH CV ECHO MEAS - LVLS AP4: 6 CM
BH CV ECHO MEAS - SI(MOD-SP2): 17.2 ML/M^2
BH CV ECHO MEAS - SI(MOD-SP4): 14.1 ML/M^2
BH CV ECHO MEAS - SV(MOD-SP2): 33 ML
BH CV ECHO MEAS - SV(MOD-SP4): 27 ML
BILIRUB SERPL-MCNC: 1 MG/DL (ref 0.2–1.2)
BUN BLD-MCNC: 5 MG/DL (ref 8–23)
BUN/CREAT SERPL: 9.3 (ref 7–25)
CALCIUM SPEC-SCNC: 8.8 MG/DL (ref 8.6–10.5)
CHLORIDE SERPL-SCNC: 92 MMOL/L (ref 98–107)
CO2 SERPL-SCNC: 21.4 MMOL/L (ref 22–29)
CREAT BLD-MCNC: 0.54 MG/DL (ref 0.57–1)
DEPRECATED RDW RBC AUTO: 55 FL (ref 37–54)
ERYTHROCYTE [DISTWIDTH] IN BLOOD BY AUTOMATED COUNT: 17.2 % (ref 12.3–15.4)
GFR SERPL CREATININE-BSD FRML MDRD: 114 ML/MIN/1.73
GLOBULIN UR ELPH-MCNC: 3.6 GM/DL
GLUCOSE BLD-MCNC: 172 MG/DL (ref 65–99)
HCT VFR BLD AUTO: 36.6 % (ref 34–46.6)
HGB BLD-MCNC: 12.4 G/DL (ref 12–15.9)
MAXIMAL PREDICTED HEART RATE: 158 BPM
MCH RBC QN AUTO: 29.6 PG (ref 26.6–33)
MCHC RBC AUTO-ENTMCNC: 33.9 G/DL (ref 31.5–35.7)
MCV RBC AUTO: 87.4 FL (ref 79–97)
PLATELET # BLD AUTO: 87 10*3/MM3 (ref 140–450)
PMV BLD AUTO: 11.2 FL (ref 6–12)
POTASSIUM BLD-SCNC: 3 MMOL/L (ref 3.5–5.2)
PROT SERPL-MCNC: 7.4 G/DL (ref 6–8.5)
RBC # BLD AUTO: 4.19 10*6/MM3 (ref 3.77–5.28)
SODIUM BLD-SCNC: 130 MMOL/L (ref 136–145)
STRESS TARGET HR: 134 BPM
TROPONIN T SERPL-MCNC: <0.01 NG/ML (ref 0–0.03)
WBC NRBC COR # BLD: 4.37 10*3/MM3 (ref 3.4–10.8)

## 2020-05-25 PROCEDURE — G0378 HOSPITAL OBSERVATION PER HR: HCPCS

## 2020-05-25 PROCEDURE — 84484 ASSAY OF TROPONIN QUANT: CPT | Performed by: NURSE PRACTITIONER

## 2020-05-25 PROCEDURE — 25010000002 IOPAMIDOL 61 % SOLUTION: Performed by: HOSPITALIST

## 2020-05-25 PROCEDURE — 85027 COMPLETE CBC AUTOMATED: CPT | Performed by: NURSE PRACTITIONER

## 2020-05-25 PROCEDURE — 97162 PT EVAL MOD COMPLEX 30 MIN: CPT | Performed by: PHYSICAL THERAPIST

## 2020-05-25 PROCEDURE — 94799 UNLISTED PULMONARY SVC/PX: CPT

## 2020-05-25 PROCEDURE — 36415 COLL VENOUS BLD VENIPUNCTURE: CPT | Performed by: NURSE PRACTITIONER

## 2020-05-25 PROCEDURE — 99244 OFF/OP CNSLTJ NEW/EST MOD 40: CPT | Performed by: INTERNAL MEDICINE

## 2020-05-25 PROCEDURE — 25010000002 LORAZEPAM PER 2 MG: Performed by: INTERNAL MEDICINE

## 2020-05-25 PROCEDURE — 71260 CT THORAX DX C+: CPT

## 2020-05-25 PROCEDURE — 25010000002 ONDANSETRON PER 1 MG: Performed by: NURSE PRACTITIONER

## 2020-05-25 PROCEDURE — 80053 COMPREHEN METABOLIC PANEL: CPT | Performed by: NURSE PRACTITIONER

## 2020-05-25 RX ORDER — LORAZEPAM 1 MG/1
1 TABLET ORAL
Status: DISCONTINUED | OUTPATIENT
Start: 2020-05-25 | End: 2020-05-28

## 2020-05-25 RX ORDER — POTASSIUM CHLORIDE 750 MG/1
40 CAPSULE, EXTENDED RELEASE ORAL AS NEEDED
Status: DISCONTINUED | OUTPATIENT
Start: 2020-05-25 | End: 2020-05-29 | Stop reason: HOSPADM

## 2020-05-25 RX ORDER — LORAZEPAM 2 MG/ML
1 INJECTION INTRAMUSCULAR
Status: DISCONTINUED | OUTPATIENT
Start: 2020-05-25 | End: 2020-05-28

## 2020-05-25 RX ORDER — LORAZEPAM 2 MG/ML
0.5 INJECTION INTRAMUSCULAR
Status: DISCONTINUED | OUTPATIENT
Start: 2020-05-25 | End: 2020-05-28

## 2020-05-25 RX ORDER — LIDOCAINE 50 MG/G
1 PATCH TOPICAL
Status: DISCONTINUED | OUTPATIENT
Start: 2020-05-26 | End: 2020-05-25

## 2020-05-25 RX ORDER — CLONIDINE HYDROCHLORIDE 0.1 MG/1
0.1 TABLET ORAL EVERY 12 HOURS SCHEDULED
Status: COMPLETED | OUTPATIENT
Start: 2020-05-25 | End: 2020-05-25

## 2020-05-25 RX ORDER — CLONIDINE HYDROCHLORIDE 0.1 MG/1
0.1 TABLET ORAL EVERY 12 HOURS SCHEDULED
Status: DISCONTINUED | OUTPATIENT
Start: 2020-05-25 | End: 2020-05-25

## 2020-05-25 RX ORDER — POTASSIUM CHLORIDE 1.5 G/1.77G
40 POWDER, FOR SOLUTION ORAL AS NEEDED
Status: DISCONTINUED | OUTPATIENT
Start: 2020-05-25 | End: 2020-05-29 | Stop reason: HOSPADM

## 2020-05-25 RX ORDER — POTASSIUM CHLORIDE 7.45 MG/ML
10 INJECTION INTRAVENOUS
Status: DISCONTINUED | OUTPATIENT
Start: 2020-05-25 | End: 2020-05-29 | Stop reason: HOSPADM

## 2020-05-25 RX ORDER — PROCHLORPERAZINE MALEATE 5 MG/1
5 TABLET ORAL NIGHTLY PRN
Status: DISCONTINUED | OUTPATIENT
Start: 2020-05-25 | End: 2020-05-29 | Stop reason: HOSPADM

## 2020-05-25 RX ORDER — LIDOCAINE 50 MG/G
1 PATCH TOPICAL
Status: DISCONTINUED | OUTPATIENT
Start: 2020-05-26 | End: 2020-05-29 | Stop reason: HOSPADM

## 2020-05-25 RX ORDER — LORAZEPAM 0.5 MG/1
0.5 TABLET ORAL
Status: DISCONTINUED | OUTPATIENT
Start: 2020-05-25 | End: 2020-05-28

## 2020-05-25 RX ADMIN — POTASSIUM CHLORIDE 40 MEQ: 10 CAPSULE, COATED, EXTENDED RELEASE ORAL at 23:46

## 2020-05-25 RX ADMIN — SODIUM CHLORIDE, PRESERVATIVE FREE 10 ML: 5 INJECTION INTRAVENOUS at 09:25

## 2020-05-25 RX ADMIN — SODIUM CHLORIDE, PRESERVATIVE FREE 10 ML: 5 INJECTION INTRAVENOUS at 19:43

## 2020-05-25 RX ADMIN — MIRTAZAPINE 15 MG: 15 TABLET, FILM COATED ORAL at 20:38

## 2020-05-25 RX ADMIN — CARVEDILOL 6.25 MG: 6.25 TABLET, FILM COATED ORAL at 06:18

## 2020-05-25 RX ADMIN — LORAZEPAM 0.5 MG: 0.5 TABLET ORAL at 10:01

## 2020-05-25 RX ADMIN — GUAIFENESIN 600 MG: 600 TABLET, EXTENDED RELEASE ORAL at 21:51

## 2020-05-25 RX ADMIN — CARVEDILOL 6.25 MG: 6.25 TABLET, FILM COATED ORAL at 17:48

## 2020-05-25 RX ADMIN — IOPAMIDOL 75 ML: 612 INJECTION, SOLUTION INTRAVENOUS at 11:30

## 2020-05-25 RX ADMIN — LIDOCAINE 1 PATCH: 50 PATCH TOPICAL at 22:55

## 2020-05-25 RX ADMIN — CLONIDINE HYDROCHLORIDE 0.1 MG: 0.1 TABLET ORAL at 09:25

## 2020-05-25 RX ADMIN — ISOSORBIDE MONONITRATE 30 MG: 30 TABLET ORAL at 09:25

## 2020-05-25 RX ADMIN — ONDANSETRON 4 MG: 2 INJECTION INTRAMUSCULAR; INTRAVENOUS at 23:46

## 2020-05-25 RX ADMIN — BUDESONIDE 0.5 MG: 0.5 INHALANT RESPIRATORY (INHALATION) at 20:11

## 2020-05-25 RX ADMIN — ASPIRIN 81 MG: 81 TABLET, COATED ORAL at 09:25

## 2020-05-25 RX ADMIN — Medication 100 MG: at 09:25

## 2020-05-25 RX ADMIN — FOLIC ACID 1 MG: 1 TABLET ORAL at 09:25

## 2020-05-25 RX ADMIN — POTASSIUM CHLORIDE 40 MEQ: 10 CAPSULE, COATED, EXTENDED RELEASE ORAL at 19:42

## 2020-05-25 RX ADMIN — LORAZEPAM 0.5 MG: 0.5 TABLET ORAL at 14:05

## 2020-05-25 RX ADMIN — NICOTINE 1 PATCH: 21 PATCH, EXTENDED RELEASE TRANSDERMAL at 12:43

## 2020-05-25 RX ADMIN — LORAZEPAM 0.5 MG: 0.5 TABLET ORAL at 23:46

## 2020-05-25 RX ADMIN — Medication 1 TABLET: at 09:25

## 2020-05-25 RX ADMIN — GUAIFENESIN 600 MG: 600 TABLET, EXTENDED RELEASE ORAL at 09:25

## 2020-05-25 RX ADMIN — ACETAMINOPHEN 650 MG: 325 TABLET, FILM COATED ORAL at 20:38

## 2020-05-25 RX ADMIN — LORAZEPAM 2 MG: 2 INJECTION INTRAMUSCULAR; INTRAVENOUS at 06:18

## 2020-05-25 RX ADMIN — LOSARTAN POTASSIUM 50 MG: 50 TABLET, FILM COATED ORAL at 09:25

## 2020-05-25 RX ADMIN — LORAZEPAM 2 MG: 2 INJECTION INTRAMUSCULAR; INTRAVENOUS at 01:29

## 2020-05-25 RX ADMIN — BUDESONIDE 0.5 MG: 0.5 INHALANT RESPIRATORY (INHALATION) at 07:29

## 2020-05-25 RX ADMIN — LORAZEPAM 0.5 MG: 0.5 TABLET ORAL at 17:48

## 2020-05-25 RX ADMIN — ONDANSETRON 4 MG: 2 INJECTION INTRAMUSCULAR; INTRAVENOUS at 00:00

## 2020-05-26 LAB — POTASSIUM BLD-SCNC: 3.1 MMOL/L (ref 3.5–5.2)

## 2020-05-26 PROCEDURE — 84132 ASSAY OF SERUM POTASSIUM: CPT | Performed by: HOSPITALIST

## 2020-05-26 PROCEDURE — 63710000001 PROCHLORPERAZINE MALEATE PER 5 MG: Performed by: HOSPITALIST

## 2020-05-26 PROCEDURE — 94799 UNLISTED PULMONARY SVC/PX: CPT

## 2020-05-26 PROCEDURE — 97110 THERAPEUTIC EXERCISES: CPT

## 2020-05-26 RX ORDER — SUCRALFATE ORAL 1 G/10ML
1 SUSPENSION ORAL
Status: DISCONTINUED | OUTPATIENT
Start: 2020-05-26 | End: 2020-05-29 | Stop reason: HOSPADM

## 2020-05-26 RX ORDER — PANTOPRAZOLE SODIUM 40 MG/1
40 TABLET, DELAYED RELEASE ORAL
Status: DISCONTINUED | OUTPATIENT
Start: 2020-05-26 | End: 2020-05-29 | Stop reason: HOSPADM

## 2020-05-26 RX ADMIN — LORAZEPAM 0.5 MG: 0.5 TABLET ORAL at 04:41

## 2020-05-26 RX ADMIN — GUAIFENESIN 600 MG: 600 TABLET, EXTENDED RELEASE ORAL at 09:03

## 2020-05-26 RX ADMIN — POTASSIUM CHLORIDE 40 MEQ: 10 CAPSULE, COATED, EXTENDED RELEASE ORAL at 20:41

## 2020-05-26 RX ADMIN — ASPIRIN 81 MG: 81 TABLET, COATED ORAL at 09:03

## 2020-05-26 RX ADMIN — LORAZEPAM 1 MG: 1 TABLET ORAL at 09:04

## 2020-05-26 RX ADMIN — CARVEDILOL 6.25 MG: 6.25 TABLET, FILM COATED ORAL at 17:48

## 2020-05-26 RX ADMIN — BUDESONIDE 0.5 MG: 0.5 INHALANT RESPIRATORY (INHALATION) at 21:04

## 2020-05-26 RX ADMIN — NICOTINE 1 PATCH: 21 PATCH, EXTENDED RELEASE TRANSDERMAL at 12:41

## 2020-05-26 RX ADMIN — ACETAMINOPHEN 650 MG: 325 TABLET, FILM COATED ORAL at 22:01

## 2020-05-26 RX ADMIN — PROCHLORPERAZINE MALEATE 5 MG: 5 TABLET, FILM COATED ORAL at 22:01

## 2020-05-26 RX ADMIN — MIRTAZAPINE 15 MG: 15 TABLET, FILM COATED ORAL at 20:41

## 2020-05-26 RX ADMIN — POTASSIUM CHLORIDE 40 MEQ: 10 CAPSULE, COATED, EXTENDED RELEASE ORAL at 10:20

## 2020-05-26 RX ADMIN — LORAZEPAM 0.5 MG: 0.5 TABLET ORAL at 12:42

## 2020-05-26 RX ADMIN — SUCRALFATE 1 G: 1 SUSPENSION ORAL at 20:41

## 2020-05-26 RX ADMIN — CARVEDILOL 6.25 MG: 6.25 TABLET, FILM COATED ORAL at 09:03

## 2020-05-26 RX ADMIN — SODIUM CHLORIDE, PRESERVATIVE FREE 10 ML: 5 INJECTION INTRAVENOUS at 09:05

## 2020-05-26 RX ADMIN — Medication 100 MG: at 09:02

## 2020-05-26 RX ADMIN — ISOSORBIDE MONONITRATE 30 MG: 30 TABLET ORAL at 09:02

## 2020-05-26 RX ADMIN — PANTOPRAZOLE SODIUM 40 MG: 40 TABLET, DELAYED RELEASE ORAL at 17:48

## 2020-05-26 RX ADMIN — GUAIFENESIN 600 MG: 600 TABLET, EXTENDED RELEASE ORAL at 20:41

## 2020-05-26 RX ADMIN — BUDESONIDE 0.5 MG: 0.5 INHALANT RESPIRATORY (INHALATION) at 07:50

## 2020-05-26 RX ADMIN — Medication 1 TABLET: at 09:04

## 2020-05-26 RX ADMIN — LOSARTAN POTASSIUM 50 MG: 50 TABLET, FILM COATED ORAL at 09:04

## 2020-05-26 RX ADMIN — SODIUM CHLORIDE, PRESERVATIVE FREE 10 ML: 5 INJECTION INTRAVENOUS at 20:41

## 2020-05-26 RX ADMIN — FOLIC ACID 1 MG: 1 TABLET ORAL at 09:03

## 2020-05-26 RX ADMIN — LORAZEPAM 0.5 MG: 0.5 TABLET ORAL at 22:01

## 2020-05-26 RX ADMIN — LORAZEPAM 0.5 MG: 0.5 TABLET ORAL at 17:49

## 2020-05-26 RX ADMIN — SUCRALFATE 1 G: 1 SUSPENSION ORAL at 12:41
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
## 2020-05-27 LAB
ANION GAP SERPL CALCULATED.3IONS-SCNC: 11.1 MMOL/L (ref 5–15)
BASOPHILS # BLD AUTO: 0.03 10*3/MM3 (ref 0–0.2)
BASOPHILS NFR BLD AUTO: 0.9 % (ref 0–1.5)
BUN BLD-MCNC: 9 MG/DL (ref 8–23)
BUN/CREAT SERPL: 12.2 (ref 7–25)
CALCIUM SPEC-SCNC: 8.9 MG/DL (ref 8.6–10.5)
CHLORIDE SERPL-SCNC: 101 MMOL/L (ref 98–107)
CO2 SERPL-SCNC: 18.9 MMOL/L (ref 22–29)
CREAT BLD-MCNC: 0.74 MG/DL (ref 0.57–1)
DEPRECATED RDW RBC AUTO: 57.8 FL (ref 37–54)
EOSINOPHIL # BLD AUTO: 0.11 10*3/MM3 (ref 0–0.4)
EOSINOPHIL NFR BLD AUTO: 3.2 % (ref 0.3–6.2)
ERYTHROCYTE [DISTWIDTH] IN BLOOD BY AUTOMATED COUNT: 17.4 % (ref 12.3–15.4)
GFR SERPL CREATININE-BSD FRML MDRD: 80 ML/MIN/1.73
GLUCOSE BLD-MCNC: 173 MG/DL (ref 65–99)
HCT VFR BLD AUTO: 33.6 % (ref 34–46.6)
HGB BLD-MCNC: 11 G/DL (ref 12–15.9)
LYMPHOCYTES # BLD AUTO: 0.88 10*3/MM3 (ref 0.7–3.1)
LYMPHOCYTES NFR BLD AUTO: 25.9 % (ref 19.6–45.3)
MCH RBC QN AUTO: 29.7 PG (ref 26.6–33)
MCHC RBC AUTO-ENTMCNC: 32.7 G/DL (ref 31.5–35.7)
MCV RBC AUTO: 90.8 FL (ref 79–97)
MONOCYTES # BLD AUTO: 0.27 10*3/MM3 (ref 0.1–0.9)
MONOCYTES NFR BLD AUTO: 7.9 % (ref 5–12)
NEUTROPHILS # BLD AUTO: 2.1 10*3/MM3 (ref 1.7–7)
NEUTROPHILS NFR BLD AUTO: 61.8 % (ref 42.7–76)
PLATELET # BLD AUTO: 96 10*3/MM3 (ref 140–450)
PMV BLD AUTO: 11.6 FL (ref 6–12)
POTASSIUM BLD-SCNC: 4.3 MMOL/L (ref 3.5–5.2)
RBC # BLD AUTO: 3.7 10*6/MM3 (ref 3.77–5.28)
SODIUM BLD-SCNC: 131 MMOL/L (ref 136–145)
WBC NRBC COR # BLD: 3.4 10*3/MM3 (ref 3.4–10.8)

## 2020-05-27 PROCEDURE — 25010000002 ONDANSETRON PER 1 MG: Performed by: NURSE PRACTITIONER

## 2020-05-27 PROCEDURE — 25010000002 LORAZEPAM PER 2 MG: Performed by: HOSPITALIST

## 2020-05-27 PROCEDURE — 63710000001 PROCHLORPERAZINE MALEATE PER 5 MG: Performed by: HOSPITALIST

## 2020-05-27 PROCEDURE — 80048 BASIC METABOLIC PNL TOTAL CA: CPT | Performed by: HOSPITALIST

## 2020-05-27 PROCEDURE — 94799 UNLISTED PULMONARY SVC/PX: CPT

## 2020-05-27 PROCEDURE — 85025 COMPLETE CBC W/AUTO DIFF WBC: CPT | Performed by: HOSPITALIST

## 2020-05-27 RX ADMIN — LORAZEPAM 1 MG: 1 TABLET ORAL at 14:53

## 2020-05-27 RX ADMIN — ONDANSETRON 4 MG: 2 INJECTION INTRAMUSCULAR; INTRAVENOUS at 20:25

## 2020-05-27 RX ADMIN — PROCHLORPERAZINE MALEATE 5 MG: 5 TABLET, FILM COATED ORAL at 22:42

## 2020-05-27 RX ADMIN — ACETAMINOPHEN 650 MG: 325 TABLET, FILM COATED ORAL at 22:37

## 2020-05-27 RX ADMIN — BUDESONIDE 0.5 MG: 0.5 INHALANT RESPIRATORY (INHALATION) at 07:47

## 2020-05-27 RX ADMIN — ASPIRIN 81 MG: 81 TABLET, COATED ORAL at 08:28

## 2020-05-27 RX ADMIN — SUCRALFATE 1 G: 1 SUSPENSION ORAL at 17:42

## 2020-05-27 RX ADMIN — SUCRALFATE 1 G: 1 SUSPENSION ORAL at 06:51

## 2020-05-27 RX ADMIN — LORAZEPAM 1 MG: 1 TABLET ORAL at 16:36

## 2020-05-27 RX ADMIN — BUDESONIDE 0.5 MG: 0.5 INHALANT RESPIRATORY (INHALATION) at 20:12

## 2020-05-27 RX ADMIN — GUAIFENESIN 600 MG: 600 TABLET, EXTENDED RELEASE ORAL at 08:28

## 2020-05-27 RX ADMIN — LORAZEPAM 1 MG: 1 TABLET ORAL at 18:50

## 2020-05-27 RX ADMIN — FOLIC ACID 1 MG: 1 TABLET ORAL at 08:27

## 2020-05-27 RX ADMIN — Medication 100 MG: at 08:27

## 2020-05-27 RX ADMIN — MIRTAZAPINE 15 MG: 15 TABLET, FILM COATED ORAL at 20:25

## 2020-05-27 RX ADMIN — GUAIFENESIN 600 MG: 600 TABLET, EXTENDED RELEASE ORAL at 20:25

## 2020-05-27 RX ADMIN — PANTOPRAZOLE SODIUM 40 MG: 40 TABLET, DELAYED RELEASE ORAL at 17:43

## 2020-05-27 RX ADMIN — LORAZEPAM 0.5 MG: 0.5 TABLET ORAL at 10:32

## 2020-05-27 RX ADMIN — POTASSIUM CHLORIDE 40 MEQ: 10 CAPSULE, COATED, EXTENDED RELEASE ORAL at 08:28

## 2020-05-27 RX ADMIN — SODIUM CHLORIDE, PRESERVATIVE FREE 10 ML: 5 INJECTION INTRAVENOUS at 08:28

## 2020-05-27 RX ADMIN — SUCRALFATE 1 G: 1 SUSPENSION ORAL at 20:25

## 2020-05-27 RX ADMIN — SODIUM CHLORIDE, PRESERVATIVE FREE 10 ML: 5 INJECTION INTRAVENOUS at 20:26

## 2020-05-27 RX ADMIN — LORAZEPAM 0.5 MG: 0.5 TABLET ORAL at 07:02

## 2020-05-27 RX ADMIN — CARVEDILOL 6.25 MG: 6.25 TABLET, FILM COATED ORAL at 08:28

## 2020-05-27 RX ADMIN — ISOSORBIDE MONONITRATE 30 MG: 30 TABLET ORAL at 08:27

## 2020-05-27 RX ADMIN — LORAZEPAM 1 MG: 1 TABLET ORAL at 20:26

## 2020-05-27 RX ADMIN — LOSARTAN POTASSIUM 50 MG: 50 TABLET, FILM COATED ORAL at 08:28

## 2020-05-27 RX ADMIN — LORAZEPAM 0.5 MG: 0.5 TABLET ORAL at 03:58

## 2020-05-27 RX ADMIN — LORAZEPAM 0.5 MG: 2 INJECTION INTRAMUSCULAR; INTRAVENOUS at 22:56

## 2020-05-27 RX ADMIN — Medication 1 TABLET: at 08:27

## 2020-05-27 RX ADMIN — CARVEDILOL 6.25 MG: 6.25 TABLET, FILM COATED ORAL at 17:43

## 2020-05-27 RX ADMIN — PANTOPRAZOLE SODIUM 40 MG: 40 TABLET, DELAYED RELEASE ORAL at 06:51

## 2020-05-28 LAB
ALBUMIN SERPL-MCNC: 3.7 G/DL (ref 3.5–5.2)
ALBUMIN/GLOB SERPL: 1.2 G/DL
ALP SERPL-CCNC: 71 U/L (ref 39–117)
ALT SERPL W P-5'-P-CCNC: 61 U/L (ref 1–33)
ANION GAP SERPL CALCULATED.3IONS-SCNC: 12.4 MMOL/L (ref 5–15)
AST SERPL-CCNC: 88 U/L (ref 1–32)
BILIRUB SERPL-MCNC: 0.4 MG/DL (ref 0.2–1.2)
BUN BLD-MCNC: 8 MG/DL (ref 8–23)
BUN/CREAT SERPL: 11.4 (ref 7–25)
CALCIUM SPEC-SCNC: 9.1 MG/DL (ref 8.6–10.5)
CHLORIDE SERPL-SCNC: 101 MMOL/L (ref 98–107)
CO2 SERPL-SCNC: 20.6 MMOL/L (ref 22–29)
CREAT BLD-MCNC: 0.7 MG/DL (ref 0.57–1)
GFR SERPL CREATININE-BSD FRML MDRD: 85 ML/MIN/1.73
GLOBULIN UR ELPH-MCNC: 3.1 GM/DL
GLUCOSE BLD-MCNC: 123 MG/DL (ref 65–99)
POTASSIUM BLD-SCNC: 3.7 MMOL/L (ref 3.5–5.2)
PROT SERPL-MCNC: 6.8 G/DL (ref 6–8.5)
SODIUM BLD-SCNC: 134 MMOL/L (ref 136–145)

## 2020-05-28 PROCEDURE — 25010000002 LORAZEPAM PER 2 MG: Performed by: HOSPITALIST

## 2020-05-28 PROCEDURE — 63710000001 PROCHLORPERAZINE MALEATE PER 5 MG: Performed by: HOSPITALIST

## 2020-05-28 PROCEDURE — 80053 COMPREHEN METABOLIC PANEL: CPT | Performed by: HOSPITALIST

## 2020-05-28 PROCEDURE — 97110 THERAPEUTIC EXERCISES: CPT

## 2020-05-28 PROCEDURE — 25010000002 ONDANSETRON PER 1 MG: Performed by: NURSE PRACTITIONER

## 2020-05-28 PROCEDURE — 94799 UNLISTED PULMONARY SVC/PX: CPT

## 2020-05-28 RX ORDER — LORAZEPAM 0.5 MG/1
0.5 TABLET ORAL EVERY 6 HOURS PRN
Status: DISCONTINUED | OUTPATIENT
Start: 2020-05-28 | End: 2020-05-29 | Stop reason: HOSPADM

## 2020-05-28 RX ADMIN — PANTOPRAZOLE SODIUM 40 MG: 40 TABLET, DELAYED RELEASE ORAL at 08:56

## 2020-05-28 RX ADMIN — ACETAMINOPHEN 650 MG: 325 TABLET, FILM COATED ORAL at 12:37

## 2020-05-28 RX ADMIN — SUCRALFATE 1 G: 1 SUSPENSION ORAL at 08:56

## 2020-05-28 RX ADMIN — SUCRALFATE 1 G: 1 SUSPENSION ORAL at 16:55

## 2020-05-28 RX ADMIN — LOSARTAN POTASSIUM 50 MG: 50 TABLET, FILM COATED ORAL at 08:48

## 2020-05-28 RX ADMIN — ONDANSETRON 4 MG: 2 INJECTION INTRAMUSCULAR; INTRAVENOUS at 20:15

## 2020-05-28 RX ADMIN — LORAZEPAM 1 MG: 1 TABLET ORAL at 00:31

## 2020-05-28 RX ADMIN — MIRTAZAPINE 15 MG: 15 TABLET, FILM COATED ORAL at 20:14

## 2020-05-28 RX ADMIN — PROCHLORPERAZINE MALEATE 5 MG: 5 TABLET, FILM COATED ORAL at 22:58

## 2020-05-28 RX ADMIN — ISOSORBIDE MONONITRATE 30 MG: 30 TABLET ORAL at 09:05

## 2020-05-28 RX ADMIN — LIDOCAINE 1 PATCH: 50 PATCH TOPICAL at 08:55

## 2020-05-28 RX ADMIN — PANTOPRAZOLE SODIUM 40 MG: 40 TABLET, DELAYED RELEASE ORAL at 16:55

## 2020-05-28 RX ADMIN — SUCRALFATE 1 G: 1 SUSPENSION ORAL at 20:13

## 2020-05-28 RX ADMIN — BUDESONIDE 0.5 MG: 0.5 INHALANT RESPIRATORY (INHALATION) at 10:32

## 2020-05-28 RX ADMIN — SODIUM CHLORIDE, PRESERVATIVE FREE 10 ML: 5 INJECTION INTRAVENOUS at 08:57

## 2020-05-28 RX ADMIN — BUDESONIDE 0.5 MG: 0.5 INHALANT RESPIRATORY (INHALATION) at 19:29

## 2020-05-28 RX ADMIN — GUAIFENESIN 600 MG: 600 TABLET, EXTENDED RELEASE ORAL at 08:56

## 2020-05-28 RX ADMIN — LORAZEPAM 1 MG: 2 INJECTION INTRAMUSCULAR; INTRAVENOUS at 03:19

## 2020-05-28 RX ADMIN — LORAZEPAM 0.5 MG: 0.5 TABLET ORAL at 20:15

## 2020-05-28 RX ADMIN — SODIUM CHLORIDE, PRESERVATIVE FREE 10 ML: 5 INJECTION INTRAVENOUS at 20:14

## 2020-05-28 RX ADMIN — SUCRALFATE 1 G: 1 SUSPENSION ORAL at 12:37

## 2020-05-28 RX ADMIN — ACETAMINOPHEN 650 MG: 325 TABLET, FILM COATED ORAL at 20:14

## 2020-05-28 RX ADMIN — CARVEDILOL 6.25 MG: 6.25 TABLET, FILM COATED ORAL at 08:48

## 2020-05-28 RX ADMIN — ASPIRIN 81 MG: 81 TABLET, COATED ORAL at 08:56

## 2020-05-28 RX ADMIN — GUAIFENESIN 600 MG: 600 TABLET, EXTENDED RELEASE ORAL at 20:13

## 2020-05-28 RX ADMIN — CARVEDILOL 6.25 MG: 6.25 TABLET, FILM COATED ORAL at 17:00

## 2020-05-29 ENCOUNTER — READMISSION MANAGEMENT (OUTPATIENT)
Dept: CALL CENTER | Facility: HOSPITAL | Age: 60
End: 2020-05-29

## 2020-05-29 VITALS
TEMPERATURE: 98 F | RESPIRATION RATE: 16 BRPM | SYSTOLIC BLOOD PRESSURE: 132 MMHG | BODY MASS INDEX: 27.35 KG/M2 | HEIGHT: 66 IN | DIASTOLIC BLOOD PRESSURE: 74 MMHG | OXYGEN SATURATION: 100 % | HEART RATE: 86 BPM | WEIGHT: 170.19 LBS

## 2020-05-29 LAB
ALBUMIN SERPL-MCNC: 3.3 G/DL (ref 3.5–5.2)
ALBUMIN/GLOB SERPL: 1.1 G/DL
ALP SERPL-CCNC: 62 U/L (ref 39–117)
ALT SERPL W P-5'-P-CCNC: 51 U/L (ref 1–33)
ANION GAP SERPL CALCULATED.3IONS-SCNC: 11.2 MMOL/L (ref 5–15)
AST SERPL-CCNC: 53 U/L (ref 1–32)
BILIRUB SERPL-MCNC: 0.3 MG/DL (ref 0.2–1.2)
BUN BLD-MCNC: 11 MG/DL (ref 8–23)
BUN/CREAT SERPL: 17.2 (ref 7–25)
CALCIUM SPEC-SCNC: 8.7 MG/DL (ref 8.6–10.5)
CHLORIDE SERPL-SCNC: 106 MMOL/L (ref 98–107)
CO2 SERPL-SCNC: 18.8 MMOL/L (ref 22–29)
CREAT BLD-MCNC: 0.64 MG/DL (ref 0.57–1)
DEPRECATED RDW RBC AUTO: 58.2 FL (ref 37–54)
EOSINOPHIL # BLD MANUAL: 0.03 10*3/MM3 (ref 0–0.4)
EOSINOPHIL NFR BLD MANUAL: 1 % (ref 0.3–6.2)
ERYTHROCYTE [DISTWIDTH] IN BLOOD BY AUTOMATED COUNT: 17.2 % (ref 12.3–15.4)
GFR SERPL CREATININE-BSD FRML MDRD: 94 ML/MIN/1.73
GLOBULIN UR ELPH-MCNC: 2.9 GM/DL
GLUCOSE BLD-MCNC: 107 MG/DL (ref 65–99)
HCT VFR BLD AUTO: 29.9 % (ref 34–46.6)
HGB BLD-MCNC: 9.9 G/DL (ref 12–15.9)
LYMPHOCYTES # BLD MANUAL: 1.01 10*3/MM3 (ref 0.7–3.1)
LYMPHOCYTES NFR BLD MANUAL: 14 % (ref 5–12)
LYMPHOCYTES NFR BLD MANUAL: 36 % (ref 19.6–45.3)
MAGNESIUM SERPL-MCNC: 1.5 MG/DL (ref 1.6–2.4)
MCH RBC QN AUTO: 30.3 PG (ref 26.6–33)
MCHC RBC AUTO-ENTMCNC: 33.1 G/DL (ref 31.5–35.7)
MCV RBC AUTO: 91.4 FL (ref 79–97)
MONOCYTES # BLD AUTO: 0.39 10*3/MM3 (ref 0.1–0.9)
NEUTROPHILS # BLD AUTO: 1.37 10*3/MM3 (ref 1.7–7)
NEUTROPHILS NFR BLD MANUAL: 49 % (ref 42.7–76)
PLAT MORPH BLD: NORMAL
PLATELET # BLD AUTO: 125 10*3/MM3 (ref 140–450)
PMV BLD AUTO: 11.4 FL (ref 6–12)
POTASSIUM BLD-SCNC: 3.1 MMOL/L (ref 3.5–5.2)
POTASSIUM BLD-SCNC: 3.9 MMOL/L (ref 3.5–5.2)
PROT SERPL-MCNC: 6.2 G/DL (ref 6–8.5)
RBC # BLD AUTO: 3.27 10*6/MM3 (ref 3.77–5.28)
RBC MORPH BLD: NORMAL
SMUDGE CELLS BLD QL SMEAR: ABNORMAL
SODIUM BLD-SCNC: 136 MMOL/L (ref 136–145)
WBC NRBC COR # BLD: 2.8 10*3/MM3 (ref 3.4–10.8)

## 2020-05-29 PROCEDURE — 85007 BL SMEAR W/DIFF WBC COUNT: CPT | Performed by: HOSPITALIST

## 2020-05-29 PROCEDURE — 80053 COMPREHEN METABOLIC PANEL: CPT | Performed by: HOSPITALIST

## 2020-05-29 PROCEDURE — 94799 UNLISTED PULMONARY SVC/PX: CPT

## 2020-05-29 PROCEDURE — 84132 ASSAY OF SERUM POTASSIUM: CPT | Performed by: HOSPITALIST

## 2020-05-29 PROCEDURE — 85025 COMPLETE CBC W/AUTO DIFF WBC: CPT | Performed by: HOSPITALIST

## 2020-05-29 PROCEDURE — 83735 ASSAY OF MAGNESIUM: CPT | Performed by: HOSPITALIST

## 2020-05-29 RX ORDER — LIDOCAINE 50 MG/G
1 PATCH TOPICAL
Qty: 30 PATCH | Refills: 0 | Status: SHIPPED | OUTPATIENT
Start: 2020-05-30 | End: 2020-06-29

## 2020-05-29 RX ORDER — MIRTAZAPINE 15 MG/1
15 TABLET, FILM COATED ORAL NIGHTLY
Qty: 30 TABLET | Refills: 0 | Status: SHIPPED | OUTPATIENT
Start: 2020-05-29 | End: 2020-06-28

## 2020-05-29 RX ORDER — LORAZEPAM 0.5 MG/1
0.5 TABLET ORAL EVERY 6 HOURS PRN
Qty: 8 TABLET | Refills: 0 | Status: SHIPPED | OUTPATIENT
Start: 2020-05-29 | End: 2020-06-07

## 2020-05-29 RX ORDER — POTASSIUM CHLORIDE 750 MG/1
10 CAPSULE, EXTENDED RELEASE ORAL DAILY
Qty: 10 CAPSULE | Refills: 0 | Status: SHIPPED | OUTPATIENT
Start: 2020-05-29

## 2020-05-29 RX ORDER — NICOTINE 21 MG/24HR
1 PATCH, TRANSDERMAL 24 HOURS TRANSDERMAL
Qty: 30 PATCH | Refills: 0 | Status: SHIPPED | OUTPATIENT
Start: 2020-05-30 | End: 2020-06-29

## 2020-05-29 RX ORDER — SUCRALFATE 1 G/1
1 TABLET ORAL 4 TIMES DAILY
Qty: 120 TABLET | Refills: 0 | Status: SHIPPED | OUTPATIENT
Start: 2020-05-29 | End: 2020-06-28

## 2020-05-29 RX ADMIN — ASPIRIN 81 MG: 81 TABLET, COATED ORAL at 08:29

## 2020-05-29 RX ADMIN — LOSARTAN POTASSIUM 50 MG: 50 TABLET, FILM COATED ORAL at 08:29

## 2020-05-29 RX ADMIN — ISOSORBIDE MONONITRATE 30 MG: 30 TABLET ORAL at 08:29

## 2020-05-29 RX ADMIN — PANTOPRAZOLE SODIUM 40 MG: 40 TABLET, DELAYED RELEASE ORAL at 07:47

## 2020-05-29 RX ADMIN — LORAZEPAM 0.5 MG: 0.5 TABLET ORAL at 08:39

## 2020-05-29 RX ADMIN — BUDESONIDE 0.5 MG: 0.5 INHALANT RESPIRATORY (INHALATION) at 08:50

## 2020-05-29 RX ADMIN — CARVEDILOL 6.25 MG: 6.25 TABLET, FILM COATED ORAL at 08:29

## 2020-05-29 RX ADMIN — SUCRALFATE 1 G: 1 SUSPENSION ORAL at 12:17

## 2020-05-29 RX ADMIN — SUCRALFATE 1 G: 1 SUSPENSION ORAL at 07:47

## 2020-05-29 RX ADMIN — LORAZEPAM 0.5 MG: 0.5 TABLET ORAL at 14:55

## 2020-05-29 RX ADMIN — POTASSIUM CHLORIDE 20 MEQ: 10 CAPSULE, COATED, EXTENDED RELEASE ORAL at 13:42

## 2020-05-29 RX ADMIN — GUAIFENESIN 600 MG: 600 TABLET, EXTENDED RELEASE ORAL at 08:29

## 2020-05-29 RX ADMIN — POTASSIUM CHLORIDE 40 MEQ: 10 CAPSULE, COATED, EXTENDED RELEASE ORAL at 08:39

## 2020-05-29 RX ADMIN — LORAZEPAM 0.5 MG: 0.5 TABLET ORAL at 02:22

## 2020-05-29 RX ADMIN — SODIUM CHLORIDE, PRESERVATIVE FREE 10 ML: 5 INJECTION INTRAVENOUS at 08:30

## 2020-05-29 RX ADMIN — LIDOCAINE 1 PATCH: 50 PATCH TOPICAL at 08:30

## 2020-05-30 NOTE — OUTREACH NOTE
Prep Survey      Responses   [facility] facility patient discharged from?  [place]   Is LACE score < 7 ?  No   Eligibility  [facility]   Date of Admission  05/23/20   Date of Discharge  05/29/20   Discharge Disposition  Home or Self Care   Discharge diagnosis   Alcoholic intoxication with complication , Benzodiazepine dependence    COVID-19 Test Status  Negative   Does the patient have one of the following disease processes/diagnoses(primary or secondary)?  Other   Does the patient have Home health ordered?  No   Is there a DME ordered?  No   Prep survey completed?  Yes          Lesia Ni RN

## 2020-06-01 ENCOUNTER — TRANSITIONAL CARE MANAGEMENT TELEPHONE ENCOUNTER (OUTPATIENT)
Dept: CALL CENTER | Facility: HOSPITAL | Age: 60
End: 2020-06-01

## 2020-06-01 NOTE — OUTREACH NOTE
Call Center TCM Note      Responses   [facility] patient discharged from?  [place]   COVID-19 Test Status  Negative   Does the patient have one of the following disease processes/diagnoses(primary or secondary)?  Other   TCM attempt successful?  No   Unsuccessful attempts  Attempt 1          Faith Fabian RN    6/1/2020, 10:27

## 2020-06-01 NOTE — OUTREACH NOTE
Call Center TCM Note      Responses   [facility]   COVID-19 Test Status  Negative   Does the patient have one of the following disease processes/diagnoses(primary or secondary)?  Other   TCM attempt successful?  Yes   Call start time  1225   Call end time  1233   Discharge diagnosis   Alcoholic intoxication with complication , Benzodiazepine dependence    Is patient permission given to speak with other caregiver?  Yes   List who call center can speak with  Detlef Willrodt, spouse   Person spoke with today (if not patient) and relationship  Detlef Willrodt, spouse   Meds reviewed with patient/caregiver?  Yes   Does the patient have all medications ordered at discharge?  Yes   Is the patient taking all medications as directed (includes completed medication regime)?  No   Nursing Interventions  Nurse provided patient education, Advised patient to call provider   Medication comments   reports that patient declines to change or take new medications ordered.    Does the patient have a primary care provider?   Yes   Does the patient have an appointment with their PCP within 7 days of discharge?  No   Comments regarding PCP  PCP listed as Dr Cerrato.  reports that patient is not going to see that PCP but will see a Dr that she previously saw but did not share the physicians name. Refused to make appt today with Dr Cerrato.    Nursing Interventions  Advised patient to make appointment, Educated patient on importance of making appointment   Has the patient kept scheduled appointments due by today?  N/A   Has home health visited the patient within 72 hours of discharge?  N/A   Pulse Ox monitoring  None   Did the patient receive a copy of their discharge instructions?  Yes   Nursing interventions  Reviewed instructions with patient []   What is the patient's perception of their health status since discharge?  Same   Is the patient/caregiver able to teach back signs  and symptoms related to disease process for when to call PCP?  Yes   Is the patient/caregiver able to teach back signs and symptoms related to disease process for when to call 911?  Yes   Is the patient/caregiver able to teach back the hierarchy of who to call/visit for symptoms/problems? PCP, Specialist, Home health nurse, Urgent Care, ED, 911  Yes   Additional teach back comments   states that patient will follow up with MD of choice and review medications.    TCM call completed?  Yes          Faith Fabian RN    6/1/2020, 12:33

## 2020-06-05 ENCOUNTER — READMISSION MANAGEMENT (OUTPATIENT)
Dept: CALL CENTER | Facility: HOSPITAL | Age: 60
End: 2020-06-05

## 2020-06-05 NOTE — OUTREACH NOTE
Medical Week 2 Survey      Responses   [facility] patient discharged from?  [place]   COVID-19 Test Status  Negative   Does the patient have one of the following disease processes/diagnoses(primary or secondary)?  Other   Week 2 attempt successful?  No   Unsuccessful attempts  Attempt 1          Tony Rain RN

## 2020-06-09 ENCOUNTER — READMISSION MANAGEMENT (OUTPATIENT)
Dept: CALL CENTER | Facility: HOSPITAL | Age: 60
End: 2020-06-09

## 2020-06-09 NOTE — OUTREACH NOTE
Medical Week 2 Survey      Responses   [facility] patient discharged from?  [place]   COVID-19 Test Status  Negative   Does the patient have one of the following disease processes/diagnoses(primary or secondary)?  Other   Week 2 attempt successful?  No   Rescheduled  Revoked [Admitted with ETOH intoxication]   Revoke  Decline to participate          Ramonita Kat RN

## 2022-10-31 NOTE — TELEPHONE ENCOUNTER
Called to confirm patient appointment, she stated that she is not able to walk due to her stroke.  She is wanting to cancel and reschedule for a later date.  She stated that something went wrong at CT and her albuterol was not called in.  She was addiment that we send in a refill of her medication.  I advised her that she would need to call her PCP or Pulm MD and she stated that she could not understand why [facility] could not send in the medication being he has seen her.  I advised her I would ask but could not guarantee that it would be refilled.    UMAIR----Please cancel the patient appointment for tomorrow and reschedule for later on.    CB: [phone]    Thanks,  Yany   Patient transported to [facility] Marci Denny RN  10/31/22 1038